# Patient Record
Sex: MALE | Race: WHITE | NOT HISPANIC OR LATINO | Employment: OTHER | URBAN - METROPOLITAN AREA
[De-identification: names, ages, dates, MRNs, and addresses within clinical notes are randomized per-mention and may not be internally consistent; named-entity substitution may affect disease eponyms.]

---

## 2018-07-23 RX ORDER — TIOTROPIUM BROMIDE 18 UG/1
18 CAPSULE ORAL; RESPIRATORY (INHALATION)
COMMUNITY

## 2018-07-23 RX ORDER — AMLODIPINE BESYLATE 10 MG/1
10 TABLET ORAL
COMMUNITY

## 2018-07-23 RX ORDER — HYDRALAZINE HYDROCHLORIDE 25 MG/1
25 TABLET, FILM COATED ORAL
COMMUNITY

## 2018-07-23 RX ORDER — FLUTICASONE PROPIONATE 50 MCG
1 SPRAY, SUSPENSION (ML) NASAL
COMMUNITY

## 2018-07-23 RX ORDER — ALBUTEROL SULFATE 90 UG/1
2 AEROSOL, METERED RESPIRATORY (INHALATION)
COMMUNITY

## 2018-07-23 RX ORDER — ASPIRIN 81 MG/1
81 TABLET ORAL
COMMUNITY

## 2018-07-23 RX ORDER — LISINOPRIL 20 MG/1
20 TABLET ORAL
COMMUNITY

## 2018-07-23 RX ORDER — CLONIDINE HYDROCHLORIDE 0.2 MG/1
0.1 TABLET ORAL
COMMUNITY

## 2018-07-23 RX ORDER — MONTELUKAST SODIUM 10 MG/1
10 TABLET ORAL
COMMUNITY

## 2018-07-23 RX ORDER — BUDESONIDE AND FORMOTEROL FUMARATE DIHYDRATE 160; 4.5 UG/1; UG/1
2 AEROSOL RESPIRATORY (INHALATION)
COMMUNITY

## 2018-07-23 RX ORDER — ATENOLOL 100 MG/1
100 TABLET ORAL
COMMUNITY

## 2018-07-23 RX ORDER — ALBUTEROL SULFATE 2.5 MG/.5ML
1 SOLUTION RESPIRATORY (INHALATION)
COMMUNITY

## 2018-07-26 ENCOUNTER — OFFICE VISIT (OUTPATIENT)
Dept: SURGERY | Facility: CLINIC | Age: 58
End: 2018-07-26
Payer: MEDICAID

## 2018-07-26 VITALS
OXYGEN SATURATION: 91 % | BODY MASS INDEX: 26.37 KG/M2 | DIASTOLIC BLOOD PRESSURE: 71 MMHG | SYSTOLIC BLOOD PRESSURE: 134 MMHG | TEMPERATURE: 97 F | HEIGHT: 67 IN | WEIGHT: 168 LBS | HEART RATE: 70 BPM

## 2018-07-26 DIAGNOSIS — R13.10 DYSPHAGIA, UNSPECIFIED TYPE: Primary | ICD-10-CM

## 2018-07-26 DIAGNOSIS — Z12.11 ENCOUNTER FOR SCREENING COLONOSCOPY: ICD-10-CM

## 2018-07-26 PROCEDURE — 99204 OFFICE O/P NEW MOD 45 MIN: CPT | Mod: S$GLB,,, | Performed by: SURGERY

## 2018-07-26 RX ORDER — SODIUM CHLORIDE 9 MG/ML
INJECTION, SOLUTION INTRAVENOUS CONTINUOUS
Status: CANCELLED | OUTPATIENT
Start: 2018-07-26

## 2018-07-26 RX ORDER — DICLOFENAC SODIUM 10 MG/G
GEL TOPICAL
COMMUNITY
Start: 2017-03-16

## 2018-07-26 RX ORDER — BENZONATATE 100 MG/1
100 CAPSULE ORAL
COMMUNITY

## 2018-07-26 RX ORDER — POLYETHYLENE GLYCOL 3350, SODIUM SULFATE ANHYDROUS, SODIUM BICARBONATE, SODIUM CHLORIDE, POTASSIUM CHLORIDE 236; 22.74; 6.74; 5.86; 2.97 G/4L; G/4L; G/4L; G/4L; G/4L
4 POWDER, FOR SOLUTION ORAL ONCE
Qty: 4000 ML | Refills: 0 | Status: SHIPPED | OUTPATIENT
Start: 2018-07-26 | End: 2018-07-26

## 2018-07-26 RX ORDER — PREDNISONE 20 MG/1
20 TABLET ORAL
COMMUNITY

## 2018-07-26 NOTE — H&P
[facility] Surgery H&P Note    Subjective:       Patient ID: Darien Le is a 58 y.o. male.    Chief Complaint: Consult; Colonoscopy; and EGD    HPI:  Darien Le is a 58 y.o. male with a history of COPD currently on 2 L home oxygen at night and a current 2 pack-a-day smoker for 20 years, gastroesophageal reflux disease with worsening dysphagia symptoms, hypertension, and seasonal allergies presents today as a referral from Rahel Toledo NP for the evaluation of endoscopy.  Patient has had significant dysphagia symptoms for the past 2 years.  He has been told for the last 2 years that he needs double endoscopy for evaluation of his dysphagia as well as to satisfy his need for screening of his colon however he has been reluctant.  Patient has significant hypertension with current treatment including 3-4 medications with 1 of them being clonidine.  He has not noticed any blood in his stool.  Dysphagia is worse at night.  He is constipated which has been increasing in severity in the last year, sometimes goes 2-3 days without bowel movements.  No changes in weight.  No family history of colon or rectal cancers.  He presents today as a new patient evaluation.    Past Medical History:   Diagnosis Date    Allergy     COPD (chronic obstructive pulmonary disease)     GERD (gastroesophageal reflux disease)     Hypertension      Past Surgical History:   Procedure Laterality Date    NECK SURGERY       History reviewed. No pertinent family history.  Social History     Social History    Marital status:      Spouse name: N/A    Number of children: N/A    Years of education: N/A     Social History Main Topics    Smoking status: Current Every Day Smoker    Smokeless tobacco: Never Used    Alcohol use No    Drug use: No    Sexual activity: Not Asked     Other Topics Concern    None     Social History Narrative    None       Current Outpatient Prescriptions   Medication Sig Dispense Refill     albuterol 90 mcg/actuation inhaler Inhale 2 puffs into the lungs.      albuterol sulfate 2.5 mg/0.5 mL Nebu Inhale 1 each into the lungs.      amLODIPine (NORVASC) 10 MG tablet Take 10 mg by mouth.      aspirin (ECOTRIN) 81 MG EC tablet Take 81 mg by mouth.      atenolol (TENORMIN) 100 MG tablet Take 100 mg by mouth.      benzonatate (TESSALON) 100 MG capsule Take 100 mg by mouth.      budesonide-formoterol 160-4.5 mcg (SYMBICORT) 160-4.5 mcg/actuation HFAA Inhale 2 puffs into the lungs.      cloNIDine (CATAPRES) 0.2 MG tablet Take 0.1 mg by mouth.      diclofenac sodium (VOLTAREN) 1 % Gel Apply topically.      fluticasone (FLONASE) 50 mcg/actuation nasal spray 1 spray by Nasal route.      hydrALAZINE (APRESOLINE) 25 MG tablet Take 25 mg by mouth.      lisinopril (PRINIVIL,ZESTRIL) 20 MG tablet Take 20 mg by mouth.      montelukast (SINGULAIR) 10 mg tablet Take 10 mg by mouth.      predniSONE (DELTASONE) 20 MG tablet Take 20 mg by mouth.      tiotropium (SPIRIVA) 18 mcg inhalation capsule Inhale 18 mcg into the lungs.      polyethylene glycol (GOLYTELY,NULYTELY) 236-22.74-6.74 -5.86 gram suspension Take 4,000 mLs (4 L total) by mouth once. for 1 dose 4000 mL 0     No current facility-administered medications for this visit.      Review of patient's allergies indicates:   Allergen Reactions    Codeine Nausea And Vomiting    Umeclidinium bromide Swelling       Review of Systems   Constitutional: Negative for appetite change, chills and fever.   HENT: Negative for congestion, dental problem and drooling.    Eyes: Negative for photophobia, discharge and itching.   Respiratory: Negative for apnea and chest tightness.    Cardiovascular: Negative for chest pain, palpitations and leg swelling.   Gastrointestinal: Positive for constipation. Negative for abdominal distention and abdominal pain.   Endocrine: Negative for cold intolerance and heat intolerance.   Genitourinary: Negative for difficulty urinating  "and dysuria.   Musculoskeletal: Negative for arthralgias and back pain.   Skin: Negative for color change and pallor.   Neurological: Negative for dizziness, facial asymmetry and headaches.   Hematological: Negative for adenopathy. Does not bruise/bleed easily.   Psychiatric/Behavioral: Negative for agitation, behavioral problems and confusion.       Objective:      Vitals:    07/26/18 1020   BP: 134/71   Pulse: 70   Temp: 97 °F (36.1 °C)   TempSrc: Oral   SpO2: (!) 91%   Weight: 76.2 kg (168 lb)   Height: 5' 7" (1.702 m)     Physical Exam   Constitutional: He is oriented to person, place, and time. He appears well-developed and well-nourished.   HENT:   Head: Normocephalic and atraumatic.   Eyes: EOM are normal. Pupils are equal, round, and reactive to light.   Neck: Normal range of motion. Neck supple. No thyromegaly present.   Cardiovascular: Normal rate and regular rhythm.    No murmur heard.  Pulmonary/Chest: Effort normal and breath sounds normal. No respiratory distress.   Abdominal: Soft. Bowel sounds are normal. He exhibits no distension. There is no tenderness.   Musculoskeletal: Normal range of motion. He exhibits no edema.   Neurological: He is alert and oriented to person, place, and time. No cranial nerve deficit.   Skin: Skin is warm. Capillary refill takes less than 2 seconds. No rash noted. He is not diaphoretic. No erythema.   Psychiatric: He has a normal mood and affect.       Lab Review: CBC: No results found for: WBC, RBC, HGB, HCT, PLT  BMP: No results found for: GLU, NA, K, CL, CO2, BUN, CREATININE, CALCIUM       Assessment:       1. Dysphagia, unspecified type    2. Encounter for screening colonoscopy        Plan:   Dysphagia, unspecified type  -     Case Request Operating Room: COLONOSCOPY, ESOPHAGOGASTRODUODENOSCOPY (EGD)  -     Place in Outpatient; Standing  -     Vital signs; Standing  -     Insert peripheral IV; Standing  -     Diet NPO; Standing  -     Place TOR hose; Standing  -     " Place sequential compression device; Standing  -     Basic metabolic panel; Future; Expected date: 07/26/2018  -     CBC auto differential; Future; Expected date: 07/26/2018  -     Pulse Oximetry Q4H; Standing  -     EKG 12-lead; Future    Encounter for screening colonoscopy  -     Case Request Operating Room: COLONOSCOPY, ESOPHAGOGASTRODUODENOSCOPY (EGD)  -     Place in Outpatient; Standing  -     Vital signs; Standing  -     Insert peripheral IV; Standing  -     Diet NPO; Standing  -     Place TOR hose; Standing  -     Place sequential compression device; Standing  -     Basic metabolic panel; Future; Expected date: 07/26/2018  -     CBC auto differential; Future; Expected date: 07/26/2018  -     Pulse Oximetry Q4H; Standing  -     EKG 12-lead; Future    Other orders  -     polyethylene glycol (GOLYTELY,NULYTELY) 236-22.74-6.74 -5.86 gram suspension; Take 4,000 mLs (4 L total) by mouth once. for 1 dose  Dispense: 4000 mL; Refill: 0  -     0.9%  NaCl infusion; Inject into the vein continuous.  -     IP VTE LOW RISK PATIENT; Standing        Medical Decision Making/Counseling:  Patient has increasing dysphagia, constipation symptoms, and has never undergone endoscopy.  He also has gastroesophageal reflux disease with inability to lay completely down at night related to coughing up stuff per his account.  He is in need of EGD and colonoscopy for evaluation of the symptoms as well as his age based risk needs for screening.  Risk and benefits were discussed in detail today.  Patient wishes to proceed the near future.    Risk and benefits of EGD were discussed in clinic in depth.  Risk of EGD were discussed to include bleeding as well as perforation.  Patient understands that if the above procedural risks were to occur, he could need further intervention to include but not exclude a blood transfusion, repeat procedure, admission to the hospital, or even surgery which would likely require transfer to a higher level of  care.   Risks and benefits of Colonoscopy were discussed in depth in clinic as well.  From a procedural standpoint, we discuss the benefits of colonoscopy to be finding colon cancers at early stages, including polyps which can be endoscopically resectable, to finding early stage colon cancers which can be better treated with current medical and surgical therapies in order to give patients a longer survival, if found in these early stages.  From a standpoint of risks, the risk of bleeding and perforation of the colon were discussed.  I personally discussed that if complications of bleeding or perforation were to occur, the patient could need as little as a blood transfusion and as much as possible hospital admission, repeat procedure, or even surgery.  During today's discussion of the procedure of colonoscopy with the patient, I personally sebastián the patient a picture to assist with counseling.  Total time spent in clinic today 45 minutes face-to-face, with greater than half of the time spent in face to face counseling.

## 2018-08-07 ENCOUNTER — HOSPITAL ENCOUNTER (OUTPATIENT)
Dept: RADIOLOGY | Facility: HOSPITAL | Age: 58
Discharge: HOME | End: 2018-08-07
Attending: NURSE PRACTITIONER
Payer: MEDICAID

## 2018-08-07 DIAGNOSIS — M25.512 LEFT SHOULDER PAIN: ICD-10-CM

## 2018-08-07 DIAGNOSIS — M25.512 LEFT SHOULDER PAIN: Primary | ICD-10-CM

## 2018-08-07 PROCEDURE — 73030 X-RAY EXAM OF SHOULDER: CPT | Mod: TC,FY,LT

## 2018-08-07 PROCEDURE — 73030 X-RAY EXAM OF SHOULDER: CPT | Mod: 26,LT,, | Performed by: RADIOLOGY

## 2018-08-24 ENCOUNTER — HOSPITAL ENCOUNTER (OUTPATIENT)
Dept: CARDIOLOGY | Facility: HOSPITAL | Age: 58
Discharge: HOME | End: 2018-08-24
Attending: SURGERY
Payer: MEDICAID

## 2018-08-24 DIAGNOSIS — Z12.11 ENCOUNTER FOR SCREENING COLONOSCOPY: ICD-10-CM

## 2018-08-24 DIAGNOSIS — R13.10 DYSPHAGIA, UNSPECIFIED TYPE: ICD-10-CM

## 2018-08-24 PROCEDURE — 93005 ELECTROCARDIOGRAM TRACING: CPT

## 2018-08-27 ENCOUNTER — HOSPITAL ENCOUNTER (OUTPATIENT)
Facility: HOSPITAL | Age: 58
Discharge: HOME | End: 2018-08-27
Attending: SURGERY | Admitting: SURGERY
Payer: MEDICAID

## 2018-08-27 ENCOUNTER — ANESTHESIA EVENT (OUTPATIENT)
Dept: SURGERY | Facility: HOSPITAL | Age: 58
End: 2018-08-27
Payer: MEDICAID

## 2018-08-27 ENCOUNTER — ANESTHESIA (OUTPATIENT)
Dept: SURGERY | Facility: HOSPITAL | Age: 58
End: 2018-08-27
Payer: MEDICAID

## 2018-08-27 VITALS
RESPIRATION RATE: 15 BRPM | HEIGHT: 67 IN | HEART RATE: 68 BPM | BODY MASS INDEX: 27.31 KG/M2 | OXYGEN SATURATION: 96 % | SYSTOLIC BLOOD PRESSURE: 142 MMHG | DIASTOLIC BLOOD PRESSURE: 84 MMHG | TEMPERATURE: 98 F | WEIGHT: 174 LBS

## 2018-08-27 DIAGNOSIS — M25.512 LEFT SHOULDER PAIN: Primary | ICD-10-CM

## 2018-08-27 DIAGNOSIS — R13.10 DYSPHAGIA, UNSPECIFIED TYPE: ICD-10-CM

## 2018-08-27 DIAGNOSIS — M19.90 OSTEOARTHRITIS: ICD-10-CM

## 2018-08-27 DIAGNOSIS — Z12.11 ENCOUNTER FOR SCREENING COLONOSCOPY: ICD-10-CM

## 2018-08-27 PROCEDURE — 25000003 PHARM REV CODE 250

## 2018-08-27 PROCEDURE — 63600175 PHARM REV CODE 636 W HCPCS: Performed by: NURSE ANESTHETIST, CERTIFIED REGISTERED

## 2018-08-27 PROCEDURE — S0028 INJECTION, FAMOTIDINE, 20 MG: HCPCS

## 2018-08-27 PROCEDURE — 37000009 HC ANESTHESIA EA ADD 15 MINS: Performed by: SURGERY

## 2018-08-27 PROCEDURE — 45384 COLONOSCOPY W/LESION REMOVAL: CPT | Performed by: SURGERY

## 2018-08-27 PROCEDURE — 43239 EGD BIOPSY SINGLE/MULTIPLE: CPT | Mod: 51,,, | Performed by: SURGERY

## 2018-08-27 PROCEDURE — 45384 COLONOSCOPY W/LESION REMOVAL: CPT | Mod: ,,, | Performed by: SURGERY

## 2018-08-27 PROCEDURE — 88305 TISSUE EXAM BY PATHOLOGIST: CPT | Mod: 26,,, | Performed by: PATHOLOGY

## 2018-08-27 PROCEDURE — 88342 IMHCHEM/IMCYTCHM 1ST ANTB: CPT | Performed by: PATHOLOGY

## 2018-08-27 PROCEDURE — 88342 IMHCHEM/IMCYTCHM 1ST ANTB: CPT | Mod: 26,,, | Performed by: PATHOLOGY

## 2018-08-27 PROCEDURE — 27201012 HC FORCEPS, HOT/COLD, DISP: Performed by: SURGERY

## 2018-08-27 PROCEDURE — 43239 EGD BIOPSY SINGLE/MULTIPLE: CPT | Performed by: SURGERY

## 2018-08-27 PROCEDURE — D9220A PRA ANESTHESIA: Mod: ,,, | Performed by: ANESTHESIOLOGY

## 2018-08-27 PROCEDURE — 88305 TISSUE EXAM BY PATHOLOGIST: CPT | Performed by: PATHOLOGY

## 2018-08-27 PROCEDURE — 37000008 HC ANESTHESIA 1ST 15 MINUTES: Performed by: SURGERY

## 2018-08-27 PROCEDURE — 25000003 PHARM REV CODE 250: Performed by: SURGERY

## 2018-08-27 RX ORDER — MIDAZOLAM HYDROCHLORIDE 1 MG/ML
INJECTION, SOLUTION INTRAMUSCULAR; INTRAVENOUS
Status: DISCONTINUED | OUTPATIENT
Start: 2018-08-27 | End: 2018-08-27

## 2018-08-27 RX ORDER — SODIUM CHLORIDE, SODIUM LACTATE, POTASSIUM CHLORIDE, CALCIUM CHLORIDE 600; 310; 30; 20 MG/100ML; MG/100ML; MG/100ML; MG/100ML
INJECTION, SOLUTION INTRAVENOUS CONTINUOUS
Status: DISCONTINUED | OUTPATIENT
Start: 2018-08-27 | End: 2018-08-27 | Stop reason: HOSPADM

## 2018-08-27 RX ORDER — DIPHENHYDRAMINE HYDROCHLORIDE 50 MG/ML
12.5 INJECTION INTRAMUSCULAR; INTRAVENOUS
Status: DISCONTINUED | OUTPATIENT
Start: 2018-08-27 | End: 2018-08-27 | Stop reason: HOSPADM

## 2018-08-27 RX ORDER — SUCRALFATE 1 G/1
1 TABLET ORAL 4 TIMES DAILY
Qty: 120 TABLET | Refills: 0 | Status: SHIPPED | OUTPATIENT
Start: 2018-08-27

## 2018-08-27 RX ORDER — ONDANSETRON 2 MG/ML
4 INJECTION INTRAMUSCULAR; INTRAVENOUS DAILY PRN
Status: DISCONTINUED | OUTPATIENT
Start: 2018-08-27 | End: 2018-08-27 | Stop reason: HOSPADM

## 2018-08-27 RX ORDER — PANTOPRAZOLE SODIUM 40 MG/1
40 TABLET, DELAYED RELEASE ORAL DAILY
Qty: 30 TABLET | Refills: 6 | Status: SHIPPED | OUTPATIENT
Start: 2018-08-27 | End: 2019-01-02 | Stop reason: SDUPTHER

## 2018-08-27 RX ORDER — SODIUM CHLORIDE 9 MG/ML
INJECTION, SOLUTION INTRAVENOUS CONTINUOUS
Status: DISCONTINUED | OUTPATIENT
Start: 2018-08-27 | End: 2018-08-27 | Stop reason: HOSPADM

## 2018-08-27 RX ORDER — FAMOTIDINE 10 MG/ML
20 INJECTION INTRAVENOUS ONCE
Status: DISCONTINUED | OUTPATIENT
Start: 2018-08-27 | End: 2018-08-27 | Stop reason: HOSPADM

## 2018-08-27 RX ORDER — FAMOTIDINE 10 MG/ML
INJECTION INTRAVENOUS
Status: COMPLETED
Start: 2018-08-27 | End: 2018-08-27

## 2018-08-27 RX ORDER — LIDOCAINE HYDROCHLORIDE 10 MG/ML
1 INJECTION, SOLUTION EPIDURAL; INFILTRATION; INTRACAUDAL; PERINEURAL ONCE
Status: DISCONTINUED | OUTPATIENT
Start: 2018-08-27 | End: 2018-08-27 | Stop reason: HOSPADM

## 2018-08-27 RX ORDER — PROPOFOL 10 MG/ML
VIAL (ML) INTRAVENOUS
Status: DISCONTINUED | OUTPATIENT
Start: 2018-08-27 | End: 2018-08-27

## 2018-08-27 RX ORDER — SODIUM CHLORIDE, SODIUM LACTATE, POTASSIUM CHLORIDE, CALCIUM CHLORIDE 600; 310; 30; 20 MG/100ML; MG/100ML; MG/100ML; MG/100ML
125 INJECTION, SOLUTION INTRAVENOUS CONTINUOUS
Status: DISCONTINUED | OUTPATIENT
Start: 2018-08-27 | End: 2018-08-27 | Stop reason: HOSPADM

## 2018-08-27 RX ADMIN — PROPOFOL 20 MG: 10 INJECTION, EMULSION INTRAVENOUS at 08:08

## 2018-08-27 RX ADMIN — PROPOFOL 20 MG: 10 INJECTION, EMULSION INTRAVENOUS at 09:08

## 2018-08-27 RX ADMIN — FAMOTIDINE 20 MG: 10 INJECTION, SOLUTION INTRAVENOUS at 07:08

## 2018-08-27 RX ADMIN — SODIUM CHLORIDE: 9 INJECTION, SOLUTION INTRAVENOUS at 07:08

## 2018-08-27 RX ADMIN — PROPOFOL 30 MG: 10 INJECTION, EMULSION INTRAVENOUS at 08:08

## 2018-08-27 RX ADMIN — PROPOFOL 50 MG: 10 INJECTION, EMULSION INTRAVENOUS at 08:08

## 2018-08-27 RX ADMIN — MIDAZOLAM HYDROCHLORIDE 2 MG: 1 INJECTION, SOLUTION INTRAMUSCULAR; INTRAVENOUS at 08:08

## 2018-08-27 NOTE — [employer] PATIENT INSTRUCTIONS
Discharge Summary/Instructions after an Endoscopic Procedure  Patient Name: Darien Le  Patient MRN: [id number]  Patient YOB: 1960 Monday, August 27, 2018  Reji Dsouza MD  RESTRICTIONS:  During your procedure today, you received medications for sedation.  These   medications may affect your judgment, balance and coordination.  Therefore,   for 24 hours, you have the following restrictions:   - DO NOT drive a car, operate machinery, make legal/financial decisions,   sign important papers or drink alcohol.    ACTIVITY:  Today: no heavy lifting, straining or running due to procedural   sedation/anesthesia.  The following day: return to full activity including work.  DIET:  Eat and drink normally unless instructed otherwise.     TREATMENT FOR COMMON SIDE EFFECTS:  - Mild abdominal pain, nausea, belching, bloating or excessive gas:  rest,   eat lightly and use a heating pad.  - Sore Throat: treat with throat lozenges and/or gargle with warm salt   water.  - Because air was used during the procedure, expelling large amounts of air   from your rectum or belching is normal.  - If a bowel prep was taken, you may not have a bowel movement for 1-3 days.    This is normal.  SYMPTOMS TO WATCH FOR AND REPORT TO YOUR PHYSICIAN:  1. Abdominal pain or bloating, other than gas cramps.  2. Chest pain.  3. Back pain.  4. Signs of infection such as: chills or fever occurring within 24 hours   after the procedure.  5. Rectal bleeding, which would show as bright red, maroon, or black stools.   (A tablespoon of blood from the rectum is not serious, especially if   hemorrhoids are present.)  6. Vomiting.  7. Weakness or dizziness.  GO DIRECTLY TO THE NEAREST EMERGENCY ROOM IF YOU HAVE ANY OF THE FOLLOWING:      Difficulty breathing              Chills and/or fever over 101 F   Persistent vomiting and/or vomiting blood   Severe abdominal pain   Severe chest pain   Black, tarry stools   Bleeding- more than one  tablespoon   Any other symptom or condition that you feel may need urgent attention  Your doctor recommends these additional instructions:  If any biopsies were taken, your doctors clinic will contact you in 1 to 2   weeks with any results.  - Discharge patient to home (ambulatory).   - Resume previous diet.   - Use Protonix (pantoprazole) 40 mg PO daily.   - Use sucralfate tablets 1 gram PO QID.   - Await pathology results.   - Repeat upper endoscopy in 2 years for surveillance.   - Return to my office in 2 weeks.  For questions, problems or results please call your physician - Reji Dsouza MD at Work:  [phone].  [facility] EMERGENCY ROOM PHONE NUMBER: [phone]  IF A COMPLICATION OR EMERGENCY SITUATION ARISES AND YOU ARE UNABLE TO REACH   YOUR PHYSICIAN - GO DIRECTLY TO THE EMERGENCY ROOM.  MD Reji Mcgarry MD  8/27/2018 9:16:31 AM  This report has been verified and signed electronically.  [employer]

## 2018-08-27 NOTE — DISCHARGE INSTRUCTIONS
Colonoscopy     A camera attached to a flexible tube with a viewing lens is used to take video pictures.     Colonoscopy is a test to view the inside of your lower digestive tract (colon and rectum). Sometimes it can show the last part of the small intestine (ileum). During the test, small pieces of tissue may be removed for testing. This is called a biopsy. Small growths, such as polyps, may also be removed.   Why is colonoscopy done?  The test is done to help look for colon cancer. And it can help find the source of abdominal pain, bleeding, and changes in bowel habits. It may be needed once a year, depending on factors such as your:  · Age  · Health history  · Family health history  · Symptoms  · Results from any prior colonoscopy  Risks and possible complications  These include:  · Bleeding               · A puncture or tear in the colon   · Risks of anesthesia  · A cancer lesion not being seen  Getting ready   To prepare for the test:  · Talk with your healthcare provider about the risks of the test (see below). Also ask your healthcare provider about alternatives to the test.  · Tell your healthcare provider about any medicines you take. Also tell him or her about any health conditions you may have.  · Make sure your rectum and colon are empty for the test. Follow the diet and bowel prep instructions exactly. If you dont, the test may need to be rescheduled.  · Plan for a friend or family member to drive you home after the test.     Colonoscopy provides an inside view of the entire colon.     You may discuss the results with your doctor right away or at a future visit.  During the test   The test is usually done in the hospital on an outpatient basis. This means you go home the same day. The procedure takes about 30 minutes. During that time:  · You are given relaxing (sedating) medicine through an IV line. You may be drowsy, or fully asleep.  · The healthcare provider will first give you a physical exam to  check for anal and rectal problems.  · Then the anus is lubricated and the scope inserted.  · If you are awake, you may have a feeling similar to needing to have a bowel movement. You may also feel pressure as air is pumped into the colon. Its OK to pass gas during the procedure.  · Biopsy, polyp removal, or other treatments may be done during the test.  After the test   You may have gas right after the test. It can help to try to pass it to help prevent later bloating. Your healthcare provider may discuss the results with you right away. Or you may need to schedule a follow-up visit to talk about the results. After the test, you can go back to your normal eating and other activities. You may be tired from the sedation and need to rest for a few hours.  Date Last Reviewed: 11/1/2016 © 2000-2017 [employer]. [address] [place] [address]. All rights reserved. This information is not intended as a substitute for professional medical care. Always follow your healthcare professional's instructions.        Discharge Instructions: After Your Surgery  Youve just had surgery. During surgery, you were given medicine called anesthesia to keep you relaxed and free of pain. After surgery, you may have some pain or nausea. This is common. Here are some tips for feeling better and getting well after surgery.     Stay on schedule with your medicine.   Going home  Your healthcare provider will show you how to take care of yourself when you go home. He or she will also answer your questions. Have an adult family member or friend drive you home. For the first 24 hours after your surgery:  · Do not drive or use heavy equipment.  · Do not make important decisions or sign legal papers.  · Do not drink alcohol.  · Have someone stay with you, if needed. He or she can watch for problems and help keep you safe.  Be sure to go to all follow-up visits with your healthcare provider. And rest after your surgery for  as long as your healthcare provider tells you to.  Coping with pain  If you have pain after surgery, pain medicine will help you feel better. Take it as told, before pain becomes severe. Also, ask your healthcare provider or pharmacist about other ways to control pain. This might be with heat, ice, or relaxation. And follow any other instructions your surgeon or nurse gives you.  Tips for taking pain medicine  To get the best relief possible, remember these points:  · Pain medicines can upset your stomach. Taking them with a little food may help.  · Most pain relievers taken by mouth need at least 20 to 30 minutes to start to work.  · Taking medicine on a schedule can help you remember to take it. Try to time your medicine so that you can take it before starting an activity. This might be before you get dressed, go for a walk, or sit down for dinner.  · Constipation is a common side effect of pain medicines. Call your healthcare provider before taking any medicines such as laxatives or stool softeners to help ease constipation. Also ask if you should skip any foods. Drinking lots of fluids and eating foods such as fruits and vegetables that are high in fiber can also help. Remember, do not take laxatives unless your surgeon has prescribed them.  · Drinking alcohol and taking pain medicine can cause dizziness and slow your breathing. It can even be deadly. Do not drink alcohol while taking pain medicine.  · Pain medicine can make you react more slowly to things. Do not drive or run machinery while taking pain medicine.  Your healthcare provider may tell you to take acetaminophen to help ease your pain. Ask him or her how much you are supposed to take each day. Acetaminophen or other pain relievers may interact with your prescription medicines or other over-the-counter (OTC) medicines. Some prescription medicines have acetaminophen and other ingredients. Using both prescription and OTC acetaminophen for pain can cause  you to overdose. Read the labels on your OTC medicines with care. This will help you to clearly know the list of ingredients, how much to take, and any warnings. It may also help you not take too much acetaminophen. If you have questions or do not understand the information, ask your pharmacist or healthcare provider to explain it to you before you take the OTC medicine.  Managing nausea  Some people have an upset stomach after surgery. This is often because of anesthesia, pain, or pain medicine, or the stress of surgery. These tips will help you handle nausea and eat healthy foods as you get better. If you were on a special food plan before surgery, ask your healthcare provider if you should follow it while you get better. These tips may help:  · Do not push yourself to eat. Your body will tell you when to eat and how much.  · Start off with clear liquids and soup. They are easier to digest.  · Next try semi-solid foods, such as mashed potatoes, applesauce, and gelatin, as you feel ready.  · Slowly move to solid foods. Dont eat fatty, rich, or spicy foods at first.  · Do not force yourself to have 3 large meals a day. Instead eat smaller amounts more often.  · Take pain medicines with a small amount of solid food, such as crackers or toast, to avoid nausea.     Call your surgeon if  · You still have pain an hour after taking medicine. The medicine may not be strong enough.  · You feel too sleepy, dizzy, or groggy. The medicine may be too strong.  · You have side effects like nausea, vomiting, or skin changes, such as rash, itching, or hives.       If you have obstructive sleep apnea  You were given anesthesia medicine during surgery to keep you comfortable and free of pain. After surgery, you may have more apnea spells because of this medicine and other medicines you were given. The spells may last longer than usual.   At home:  · Keep using the continuous positive airway pressure (CPAP) device when you sleep.  Unless your healthcare provider tells you not to, use it when you sleep, day or night. CPAP is a common device used to treat obstructive sleep apnea.  · Talk with your provider before taking any pain medicine, muscle relaxants, or sedatives. Your provider will tell you about the possible dangers of taking these medicines.  Date Last Reviewed: 12/1/2016 © 2000-2017 [employer]. [address] [place] [address], [place] [address]. All rights reserved. This information is not intended as a substitute for professional medical care. Always follow your healthcare professional's instructions.

## 2018-08-27 NOTE — H&P
[facility] H&P Note     Subjective:       Patient ID: Darien Le is a 58 y.o. male.     Chief Complaint: Consult; Colonoscopy; and EGD     HPI:  Darien Le is a 58 y.o. male with a history of COPD currently on 2 L home oxygen at night and a current 2 pack-a-day smoker for 20 years, gastroesophageal reflux disease with worsening dysphagia symptoms, hypertension, and seasonal allergies presents today as a referral from Rahel Toledo NP for the evaluation of endoscopy.  Patient has had significant dysphagia symptoms for the past 2 years.  He has been told for the last 2 years that he needs double endoscopy for evaluation of his dysphagia as well as to satisfy his need for screening of his colon however he has been reluctant.  Patient has significant hypertension with current treatment including 3-4 medications with 1 of them being clonidine.  He has not noticed any blood in his stool.  Dysphagia is worse at night.  He is constipated which has been increasing in severity in the last year, sometimes goes 2-3 days without bowel movements.  No changes in weight.  No family history of colon or rectal cancers.  He presents today as a new patient evaluation.          Past Medical History:   Diagnosis Date    Allergy      COPD (chronic obstructive pulmonary disease)      GERD (gastroesophageal reflux disease)      Hypertension              Past Surgical History:   Procedure Laterality Date    NECK SURGERY          History reviewed. No pertinent family history.  Social History            Social History    Marital status:        Spouse name: N/A    Number of children: N/A    Years of education: N/A           Social History Main Topics    Smoking status: Current Every Day Smoker    Smokeless tobacco: Never Used    Alcohol use No    Drug use: No    Sexual activity: Not Asked      Other Topics Concern    None          Social History Narrative    None         Current Medications           Current Outpatient Prescriptions   Medication Sig Dispense Refill    albuterol 90 mcg/actuation inhaler Inhale 2 puffs into the lungs.        albuterol sulfate 2.5 mg/0.5 mL Nebu Inhale 1 each into the lungs.        amLODIPine (NORVASC) 10 MG tablet Take 10 mg by mouth.        aspirin (ECOTRIN) 81 MG EC tablet Take 81 mg by mouth.        atenolol (TENORMIN) 100 MG tablet Take 100 mg by mouth.        benzonatate (TESSALON) 100 MG capsule Take 100 mg by mouth.        budesonide-formoterol 160-4.5 mcg (SYMBICORT) 160-4.5 mcg/actuation HFAA Inhale 2 puffs into the lungs.        cloNIDine (CATAPRES) 0.2 MG tablet Take 0.1 mg by mouth.        diclofenac sodium (VOLTAREN) 1 % Gel Apply topically.        fluticasone (FLONASE) 50 mcg/actuation nasal spray 1 spray by Nasal route.        hydrALAZINE (APRESOLINE) 25 MG tablet Take 25 mg by mouth.        lisinopril (PRINIVIL,ZESTRIL) 20 MG tablet Take 20 mg by mouth.        montelukast (SINGULAIR) 10 mg tablet Take 10 mg by mouth.        predniSONE (DELTASONE) 20 MG tablet Take 20 mg by mouth.        tiotropium (SPIRIVA) 18 mcg inhalation capsule Inhale 18 mcg into the lungs.        polyethylene glycol (GOLYTELY,NULYTELY) 236-22.74-6.74 -5.86 gram suspension Take 4,000 mLs (4 L total) by mouth once. for 1 dose 4000 mL 0      No current facility-administered medications for this visit.               Review of patient's allergies indicates:   Allergen Reactions    Codeine Nausea And Vomiting    Umeclidinium bromide Swelling         Review of Systems   Constitutional: Negative for appetite change, chills and fever.   HENT: Negative for congestion, dental problem and drooling.    Eyes: Negative for photophobia, discharge and itching.   Respiratory: Negative for apnea and chest tightness.    Cardiovascular: Negative for chest pain, palpitations and leg swelling.   Gastrointestinal: Positive for constipation. Negative for abdominal distention and abdominal pain.  "  Endocrine: Negative for cold intolerance and heat intolerance.   Genitourinary: Negative for difficulty urinating and dysuria.   Musculoskeletal: Negative for arthralgias and back pain.   Skin: Negative for color change and pallor.   Neurological: Negative for dizziness, facial asymmetry and headaches.   Hematological: Negative for adenopathy. Does not bruise/bleed easily.   Psychiatric/Behavioral: Negative for agitation, behavioral problems and confusion.       Objective:   Vitals       Vitals:     07/26/18 1020   BP: 134/71   Pulse: 70   Temp: 97 °F (36.1 °C)   TempSrc: Oral   SpO2: (!) 91%   Weight: 76.2 kg (168 lb)   Height: 5' 7" (1.702 m)         Physical Exam   Constitutional: He is oriented to person, place, and time. He appears well-developed and well-nourished.   HENT:   Head: Normocephalic and atraumatic.   Eyes: EOM are normal. Pupils are equal, round, and reactive to light.   Neck: Normal range of motion. Neck supple. No thyromegaly present.   Cardiovascular: Normal rate and regular rhythm.    No murmur heard.  Pulmonary/Chest: Effort normal and breath sounds normal. No respiratory distress.   Abdominal: Soft. Bowel sounds are normal. He exhibits no distension. There is no tenderness.   Musculoskeletal: Normal range of motion. He exhibits no edema.   Neurological: He is alert and oriented to person, place, and time. No cranial nerve deficit.   Skin: Skin is warm. Capillary refill takes less than 2 seconds. No rash noted. He is not diaphoretic. No erythema.   Psychiatric: He has a normal mood and affect.         Lab Review: CBC: No results found for: WBC, RBC, HGB, HCT, PLT  BMP: No results found for: GLU, NA, K, CL, CO2, BUN, CREATININE, CALCIUM     Assessment:       1. Dysphagia, unspecified type    2. Encounter for screening colonoscopy        Plan:   Dysphagia, unspecified type  -     Case Request Operating Room: COLONOSCOPY, ESOPHAGOGASTRODUODENOSCOPY (EGD)  -     Place in Outpatient; Standing  -  "    Vital signs; Standing  -     Insert peripheral IV; Standing  -     Diet NPO; Standing  -     Place TOR hose; Standing  -     Place sequential compression device; Standing  -     Basic metabolic panel; Future; Expected date: 07/26/2018  -     CBC auto differential; Future; Expected date: 07/26/2018  -     Pulse Oximetry Q4H; Standing  -     EKG 12-lead; Future     Encounter for screening colonoscopy  -     Case Request Operating Room: COLONOSCOPY, ESOPHAGOGASTRODUODENOSCOPY (EGD)  -     Place in Outpatient; Standing  -     Vital signs; Standing  -     Insert peripheral IV; Standing  -     Diet NPO; Standing  -     Place TOR hose; Standing  -     Place sequential compression device; Standing  -     Basic metabolic panel; Future; Expected date: 07/26/2018  -     CBC auto differential; Future; Expected date: 07/26/2018  -     Pulse Oximetry Q4H; Standing  -     EKG 12-lead; Future     Other orders  -     polyethylene glycol (GOLYTELY,NULYTELY) 236-22.74-6.74 -5.86 gram suspension; Take 4,000 mLs (4 L total) by mouth once. for 1 dose  Dispense: 4000 mL; Refill: 0  -     0.9%  NaCl infusion; Inject into the vein continuous.  -     IP VTE LOW RISK PATIENT; Standing           Medical Decision Making/Counseling:  Patient has increasing dysphagia, constipation symptoms, and has never undergone endoscopy.  He also has gastroesophageal reflux disease with inability to lay completely down at night related to coughing up stuff per his account.  He is in need of EGD and colonoscopy for evaluation of the symptoms as well as his age based risk needs for screening.  Risk and benefits were discussed in detail today.  Patient wishes to proceed the near future.     Risk and benefits of EGD were discussed in clinic in depth.  Risk of EGD were discussed to include bleeding as well as perforation.  Patient understands that if the above procedural risks were to occur, he could need further intervention to include but not exclude a blood  transfusion, repeat procedure, admission to the hospital, or even surgery which would likely require transfer to a higher level of care.   Risks and benefits of Colonoscopy were discussed in depth in clinic as well.  From a procedural standpoint, we discuss the benefits of colonoscopy to be finding colon cancers at early stages, including polyps which can be endoscopically resectable, to finding early stage colon cancers which can be better treated with current medical and surgical therapies in order to give patients a longer survival, if found in these early stages.  From a standpoint of risks, the risk of bleeding and perforation of the colon were discussed.  I personally discussed that if complications of bleeding or perforation were to occur, the patient could need as little as a blood transfusion and as much as possible hospital admission, repeat procedure, or even surgery.  During today's discussion of the procedure of colonoscopy with the patient, I personally sebastián the patient a picture to assist with counseling.  Total time spent in clinic today 45 minutes face-to-face, with greater than half of the time spent in face to face counseling.                  Patient seen and examined this am.  Ready for egd/cscope.  No changes since H&P

## 2018-08-27 NOTE — ANESTHESIA PREPROCEDURE EVALUATION
08/27/2018  Darien Le is a 58 y.o., male.    Anesthesia Evaluation    I have reviewed the Patient Summary Reports.    I have reviewed the Nursing Notes.   I have reviewed the Medications.   Steroids Taken In Past Year: Prednisone    Review of Systems  Cardiovascular:   Hypertension    Pulmonary:   COPD        Physical Exam  General:  Well nourished    Airway/Jaw/Neck:  Airway Findings: Mouth Opening: Normal Tongue: Normal  General Airway Assessment: Adult  Mallampati: III  TM Distance: Normal, at least 6 cm  Jaw/Neck Findings:  Neck ROM: Normal ROM      Dental:  Dental Findings: Edentulous   Chest/Lungs:  Chest/Lungs Findings: Clear to auscultation     Heart/Vascular:  Heart Findings: Rate: Normal  Rhythm: Regular Rhythm        Mental Status:  Mental Status Findings:  Cooperative, Alert and Oriented         Anesthesia Plan  Type of Anesthesia, risks & benefits discussed:  Anesthesia Type:  general  Patient's Preference:   Intra-op Monitoring Plan: standard ASA monitors  Intra-op Monitoring Plan Comments:   Post Op Pain Control Plan: IV/PO Opioids PRN  Post Op Pain Control Plan Comments:   Induction:   IV  Beta Blocker:  Patient is not currently on a Beta-Blocker (No further documentation required).       Informed Consent: Patient understands risks and agrees with Anesthesia plan.  Questions answered. Anesthesia consent signed with patient.  ASA Score: 3     Day of Surgery Review of History & Physical: I have interviewed and examined the patient. I have reviewed the patient's H&P dated:            Ready For Surgery From Anesthesia Perspective.

## 2018-08-27 NOTE — ANESTHESIA POSTPROCEDURE EVALUATION
"Anesthesia Post Evaluation    Patient: Darien Le    Procedure(s) Performed: Procedure(s) (LRB):  COLONOSCOPY (N/A)  ESOPHAGOGASTRODUODENOSCOPY (EGD) (N/A)    Final Anesthesia Type: general  Patient location during evaluation: PACU  Patient participation: Yes- Able to Participate  Level of consciousness: awake and alert  Pain management: adequate  Airway patency: patent  PONV status at discharge: No PONV  Anesthetic complications: no      Cardiovascular status: blood pressure returned to baseline  Respiratory status: unassisted  Hydration status: euvolemic  Follow-up not needed.        Visit Vitals  BP (!) 142/84   Pulse 68   Temp 36.7 °C (98 °F) (Oral)   Resp 15   Ht 5' 7" (1.702 m)   Wt 78.9 kg (174 lb)   SpO2 96%   BMI 27.25 kg/m²       Pain/Mary Anne Score: Mary Anne Score: 10 (8/27/2018  9:40 AM)        "

## 2018-08-27 NOTE — TRANSFER OF CARE
"Anesthesia Transfer of Care Note    Patient: Darien Le    Procedure(s) Performed: Procedure(s) (LRB):  COLONOSCOPY (N/A)  ESOPHAGOGASTRODUODENOSCOPY (EGD) (N/A)    Patient location: PACU    Anesthesia Type: general    Transport from OR: Transported from OR on room air with adequate spontaneous ventilation    Post pain: adequate analgesia    Post assessment: no apparent anesthetic complications and tolerated procedure well    Post vital signs: stable    Level of consciousness: awake, alert and oriented    Nausea/Vomiting: no nausea/vomiting    Complications: none    Transfer of care protocol was followed      Last vitals:   Visit Vitals  /80   Pulse 70   Temp 36.7 °C (98 °F) (Oral)   Resp 15   Ht 5' 7" (1.702 m)   Wt 78.9 kg (174 lb)   SpO2 (!) 90%   BMI 27.25 kg/m²     "

## 2018-08-27 NOTE — [employer] PATIENT INSTRUCTIONS
Discharge Summary/Instructions after an Endoscopic Procedure  Patient Name: Darien Le  Patient MRN: [id number]  Patient YOB: 1960 Monday, August 27, 2018  Reji Dsouza MD  RESTRICTIONS:  During your procedure today, you received medications for sedation.  These   medications may affect your judgment, balance and coordination.  Therefore,   for 24 hours, you have the following restrictions:   - DO NOT drive a car, operate machinery, make legal/financial decisions,   sign important papers or drink alcohol.    ACTIVITY:  Today: no heavy lifting, straining or running due to procedural   sedation/anesthesia.  The following day: return to full activity including work.  DIET:  Eat and drink normally unless instructed otherwise.     TREATMENT FOR COMMON SIDE EFFECTS:  - Mild abdominal pain, nausea, belching, bloating or excessive gas:  rest,   eat lightly and use a heating pad.  - Sore Throat: treat with throat lozenges and/or gargle with warm salt   water.  - Because air was used during the procedure, expelling large amounts of air   from your rectum or belching is normal.  - If a bowel prep was taken, you may not have a bowel movement for 1-3 days.    This is normal.  SYMPTOMS TO WATCH FOR AND REPORT TO YOUR PHYSICIAN:  1. Abdominal pain or bloating, other than gas cramps.  2. Chest pain.  3. Back pain.  4. Signs of infection such as: chills or fever occurring within 24 hours   after the procedure.  5. Rectal bleeding, which would show as bright red, maroon, or black stools.   (A tablespoon of blood from the rectum is not serious, especially if   hemorrhoids are present.)  6. Vomiting.  7. Weakness or dizziness.  GO DIRECTLY TO THE NEAREST EMERGENCY ROOM IF YOU HAVE ANY OF THE FOLLOWING:      Difficulty breathing              Chills and/or fever over 101 F   Persistent vomiting and/or vomiting blood   Severe abdominal pain   Severe chest pain   Black, tarry stools   Bleeding- more than one  tablespoon   Any other symptom or condition that you feel may need urgent attention  Your doctor recommends these additional instructions:  If any biopsies were taken, your doctors clinic will contact you in 1 to 2   weeks with any results.  - Discharge patient to home (ambulatory).   - High fiber diet.   - Await pathology results.   - Repeat colonoscopy in 3 years for surveillance.   - Return to my office in 2 weeks.  For questions, problems or results please call your physician - Reji Dsouza MD at Work:  [phone].  [facility] EMERGENCY ROOM PHONE NUMBER: [phone]  IF A COMPLICATION OR EMERGENCY SITUATION ARISES AND YOU ARE UNABLE TO REACH   YOUR PHYSICIAN - GO DIRECTLY TO THE EMERGENCY ROOM.  MD Reji Mcgarry MD  8/27/2018 9:19:23 AM  This report has been verified and signed electronically.  [employer]

## 2018-08-31 ENCOUNTER — CLINICAL SUPPORT (OUTPATIENT)
Dept: REHABILITATION | Facility: HOSPITAL | Age: 58
End: 2018-08-31
Payer: MEDICAID

## 2018-08-31 DIAGNOSIS — M62.81 MUSCLE WEAKNESS: ICD-10-CM

## 2018-08-31 PROCEDURE — 97161 PT EVAL LOW COMPLEX 20 MIN: CPT

## 2018-08-31 NOTE — PROGRESS NOTES
[facility]  Physical Therapy Initial Evaluation/Plan of Care    Name: Arlyn eL  Clinic Number: [id number]    Therapy Diagnosis:   Encounter Diagnosis   Name Primary?    Muscle weakness      Physician:  Rahel VELAZQUEZ    Physician Orders: PT Eval and Treat left shoulder  Medical Diagnosis: Pain in left shoulder  Evaluation Date: 8/31/2018  Authorization period Expiration:  n/a  Plan of Care Certification Period: 11/26/18    Visit #: 1/ Visits authorized: 12  Time In:10:10  Time Out: 10:50  Total Billable Time: 25 minutes    Precautions: Standard  Subjective   Date of onset: chronic  History of current condition - ARLYN presents with progressive left shoulder pain increasing over past 4 months, no specific injury reported. He has been told he needs a RTC repair as well as bilateral TKA's. He states he was scheduled for left TKR in April of this year but had to cancel secondary to COPD exacerbation found when getting medical clearance. Past medical history also significant for ACF of C5-C7 in 1999.      Past Medical History:   Diagnosis Date    Allergy     COPD (chronic obstructive pulmonary disease)     Hypertension      Arlyn Le  has a past surgical history that includes Neck surgery; COLONOSCOPY (N/A, 8/27/2018); and ESOPHAGOGASTRODUODENOSCOPY (EGD) (N/A, 8/27/2018).    Arlyn has a current medication list which includes the following prescription(s): albuterol, albuterol sulfate, amlodipine, aspirin, atenolol, benzonatate, budesonide-formoterol 160-4.5 mcg, clonidine, diclofenac sodium, fluticasone, hydralazine, lisinopril, montelukast, pantoprazole, prednisone, sucralfate, and tiotropium.    Review of patient's allergies indicates:   Allergen Reactions    Codeine Nausea And Vomiting      Imaging, X-ray: 08/07/18  1. Mild glenohumeral degenerative osteoarthrosis.  2. Moderate AC joint degenerative osteoarthrosis.    Prior Therapy: none   Social History:  lives with their  family  Occupation: sports referee  Prior Level of Function: independent ADL's with no mobility issues  Current Level of Function: pain limiting LUE function    Pain:  Current 7/10, worst 10/10, best 4/10   Location: shoulder  left  Description: Aching, Burning, Sharp and Variable  Aggravating Factors: Lifting, lying on right side, sometimes for no reason at all, reaching, lifting overhead  Easing Factors: nothing    Pts goals: Decrease pain and improve function of LUE.    Objective   Observation: Patient is well developed, well nourished male in NAD.  Postural examination/scapula alignment: Rounded shoulder, Head forward, Affected scapula depressed, Affected scapula abducted, Affected scapula downwardly rotated, Slouched posture, Increased thoracic kyphosis, Decreased cervical lordosis and RTC/scapular muscle atrophy evident.    Active / Passive Range of Motion:   Shoulder Left Right   Flexion 124/170 WFL's   Abduction 41/153 WFL's   ER at 90 L C-6/80 WFL's   IR Lat hip/60 WFL's     Upper Extremity Strength  (R) UE  (L) UE    Shoulder flexion: 5/5 Shoulder flexion: 4/5   Shoulder Abduction: 5/5 Shoulder abduction: 4-/5   Shoulder ER 5/5 Shoulder ER 4-/5   Shoulder IR 5/5 Shoulder IR 4/5   Biceps 5/5 Biceps 4-/5   Triceps 5/5 Triceps 5/5   Rhomboids 4/5 Rhomboids 4-/5     Special Tests:  AC Joint Left   AC Joint Compression Test pos   Empty Can Test pos   Drop Arm test neg   Subscaputlaris Lift Off pos   Hawkin's Kenndy pos   Neer's Test neg   Speed's test pos   Anterior Apprehension test neg   Sulcus Sign neg   Joint Mobility: decreased thoracic spine, posterior and caudal G-H jt  UE reflexes: intact and symmetrical bilaterally   Palpation: tenderness to palpation anterior and lateral shoulder, A-C joint  Sensation: gross sensation intact  Flexibility: decreased anterior/chest muscles    PT Evaluation Completed:Yes  Discussed Plan of Care with patient: Yes    TREATMENT   Home Exercises and Patient Education  Provided    Education provided re: postural correction  - progress towards goals   - role of therapy in multi - disciplinary team, goals for therapy  No spiritual or educational barriers to learning provided    Written Home Exercises Provided: no  Assessment   This is a 58 year old male referred to outpatient physical therapy and presents with a medical diagnosis of left shoulder pain and demonstrates limitations as described in the problem list. Pt will benefit from physcial therapy services in order to maximize pain free functional independence. The following goals were discussed with the patient and patient is in agreement with them as to be addressed in the treatment plan.     Pt prognosis is Good.   Pt will benefit from skilled outpatient Physical Therapy to address the deficits stated above and in the chart below, provide pt/family education, and to maximize pt's level of independence.     Plan of care discussed with patient: Yes  Pt's spiritual, cultural and educational needs considered and pt agreeable to plan of care and goals as stated below:     Anticipated Barriers for therapy: none identified    Medical necessity is demonstrated by the following IMPAIRMENTS/PROBLEM LIST:   1)Increase in pain level limiting function   2)muscle weakness   3)positive impingement   4)postural dysfunction   5)Lack of HEP    GOALS: Short Term Goals: 3 weeks  1.Report decreased left shoulder pain  <   / =  5 /10  to increase tolerance for ADL's and functional activities.  2. Increased AROM by > / = 15 degrees flex, 30 degrees abd, and 10 degrees IR/ER to increase LUE function.  3. Increased MMT for left shoulder by 1/2 muscle grade to increase tolerance for ADL and work activities.  4. Pt to demonstrate improved postural awareness during exercise with minimal cueing.  5. Pt to tolerate HEP to improve ROM and independence with ADL's    Long Term Goals: 6 weeks  1.Report decreased left shoulder pain  <   / =  2.5 /10  to  increase tolerance for ADL's and functional activities.   2.Pt to demonstrate functional AROM left shoulder for improved performance of reaching and overhead activities.  3.Increased MMT for left shoulder by 1 muscle  to increase tolerance for ADL and work activities.  4. Pt to report a decrease score from 68% to < / = 50% impairment on Quick Dash to demonstrate improved UE function.  5. Pt to be Independent with HEP to improve ROM and independence with ADL's.    Plan   Pt will be treated by physical therapy 2 times a week for 6 weeks for Pt Education, HEP, therapeutic exercises, neuromuscular re-education, joint mobilizations, modalities prn to achieve established goals. Darien may at times be seen by a PTA as part of the Rehab Team.     Cont PT for 6 weeks.     Sanjeev Silva, PT    I certify the need for these services furnished under this plan of treatment and while under my care.______________________________ Physician/Referring Practitioner  Date of Signature

## 2018-08-31 NOTE — PLAN OF CARE
[facility]  Physical Therapy Initial Evaluation/Plan of Care    Name: Arlyn Le  Clinic Number: [id number]    Therapy Diagnosis:   Encounter Diagnosis   Name Primary?    Muscle weakness      Physician:  Rahel VELAZQUEZ    Physician Orders: PT Eval and Treat left shoulder  Medical Diagnosis: Pain in left shoulder  Evaluation Date: 8/31/2018  Authorization period Expiration:  n/a  Plan of Care Certification Period: 11/26/18    Visit #: 1/ Visits authorized: 12  Time In:10:10  Time Out: 10:50  Total Billable Time: 25 minutes    Precautions: Standard  Subjective   Date of onset: chronic  History of current condition - ARLYN presents with progressive left shoulder pain increasing over past 4 months, no specific injury reported. He has been told he needs a RTC repair as well as bilateral TKA's. He states he was scheduled for left TKR in April of this year but had to cancel secondary to COPD exacerbation found when getting medical clearance. Past medical history also significant for ACF of C5-C7 in 1999.      Past Medical History:   Diagnosis Date    Allergy     COPD (chronic obstructive pulmonary disease)     Hypertension      Arlyn Le  has a past surgical history that includes Neck surgery; COLONOSCOPY (N/A, 8/27/2018); and ESOPHAGOGASTRODUODENOSCOPY (EGD) (N/A, 8/27/2018).    Arlyn has a current medication list which includes the following prescription(s): albuterol, albuterol sulfate, amlodipine, aspirin, atenolol, benzonatate, budesonide-formoterol 160-4.5 mcg, clonidine, diclofenac sodium, fluticasone, hydralazine, lisinopril, montelukast, pantoprazole, prednisone, sucralfate, and tiotropium.    Review of patient's allergies indicates:   Allergen Reactions    Codeine Nausea And Vomiting      Imaging, X-ray: 08/07/18  1. Mild glenohumeral degenerative osteoarthrosis.  2. Moderate AC joint degenerative osteoarthrosis.    Prior Therapy: none   Social History:  lives with their  family  Occupation: sports referee  Prior Level of Function: independent ADL's with no mobility issues  Current Level of Function: pain limiting LUE function    Pain:  Current 7/10, worst 10/10, best 4/10   Location: shoulder  left  Description: Aching, Burning, Sharp and Variable  Aggravating Factors: Lifting, lying on right side, sometimes for no reason at all, reaching, lifting overhead  Easing Factors: nothing    Pts goals: Decrease pain and improve function of LUE.    Objective   Observation: Patient is well developed, well nourished male in NAD.  Postural examination/scapula alignment: Rounded shoulder, Head forward, Affected scapula depressed, Affected scapula abducted, Affected scapula downwardly rotated, Slouched posture, Increased thoracic kyphosis, Decreased cervical lordosis and RTC/scapular muscle atrophy evident.    Active / Passive Range of Motion:   Shoulder Left Right   Flexion 124/170 WFL's   Abduction 41/153 WFL's   ER at 90 L C-6/80 WFL's   IR Lat hip/60 WFL's     Upper Extremity Strength  (R) UE  (L) UE    Shoulder flexion: 5/5 Shoulder flexion: 4/5   Shoulder Abduction: 5/5 Shoulder abduction: 4-/5   Shoulder ER 5/5 Shoulder ER 4-/5   Shoulder IR 5/5 Shoulder IR 4/5   Biceps 5/5 Biceps 4-/5   Triceps 5/5 Triceps 5/5   Rhomboids 4/5 Rhomboids 4-/5     Special Tests:  AC Joint Left   AC Joint Compression Test pos   Empty Can Test pos   Drop Arm test neg   Subscaputlaris Lift Off pos   Hawkin's Kenndy pos   Neer's Test neg   Speed's test pos   Anterior Apprehension test neg   Sulcus Sign neg   Joint Mobility: decreased thoracic spine, posterior and caudal G-H jt  UE reflexes: intact and symmetrical bilaterally   Palpation: tenderness to palpation anterior and lateral shoulder, A-C joint  Sensation: gross sensation intact  Flexibility: decreased anterior/chest muscles    PT Evaluation Completed:Yes  Discussed Plan of Care with patient: Yes    TREATMENT   Home Exercises and Patient Education  Provided    Education provided re: postural correction  - progress towards goals   - role of therapy in multi - disciplinary team, goals for therapy  No spiritual or educational barriers to learning provided    Written Home Exercises Provided: no  Assessment   This is a 58 year old male referred to outpatient physical therapy and presents with a medical diagnosis of left shoulder pain and demonstrates limitations as described in the problem list. Pt will benefit from physcial therapy services in order to maximize pain free functional independence. The following goals were discussed with the patient and patient is in agreement with them as to be addressed in the treatment plan.     Pt prognosis is Good.   Pt will benefit from skilled outpatient Physical Therapy to address the deficits stated above and in the chart below, provide pt/family education, and to maximize pt's level of independence.     Plan of care discussed with patient: Yes  Pt's spiritual, cultural and educational needs considered and pt agreeable to plan of care and goals as stated below:     Anticipated Barriers for therapy: none identified    Medical necessity is demonstrated by the following IMPAIRMENTS/PROBLEM LIST:   1)Increase in pain level limiting function   2)muscle weakness   3)positive impingement   4)postural dysfunction   5)Lack of HEP    GOALS: Short Term Goals: 3 weeks  1.Report decreased left shoulder pain  <   / =  5 /10  to increase tolerance for ADL's and functional activities.  2. Increased AROM by > / = 15 degrees flex, 30 degrees abd, and 10 degrees IR/ER to increase LUE function.  3. Increased MMT for left shoulder by 1/2 muscle grade to increase tolerance for ADL and work activities.  4. Pt to demonstrate improved postural awareness during exercise with minimal cueing.  5. Pt to tolerate HEP to improve ROM and independence with ADL's    Long Term Goals: 6 weeks  1.Report decreased left shoulder pain  <   / =  2.5 /10  to  increase tolerance for ADL's and functional activities.   2.Pt to demonstrate functional AROM left shoulder for improved performance of reaching and overhead activities.  3.Increased MMT for left shoulder by 1 muscle  to increase tolerance for ADL and work activities.  4. Pt to report a decrease score from 68% to < / = 50% impairment on Quick Dash to demonstrate improved UE function.  5. Pt to be Independent with HEP to improve ROM and independence with ADL's.    Plan   Pt will be treated by physical therapy 2 times a week for 6 weeks for Pt Education, HEP, therapeutic exercises, neuromuscular re-education, joint mobilizations, modalities prn to achieve established goals. Darien may at times be seen by a PTA as part of the Rehab Team.     Cont PT for 6 weeks.     Sanjeev Silva, PT    I certify the need for these services furnished under this plan of treatment and while under my care.______________________________ Physician/Referring Practitioner  Date of Signature

## 2018-08-31 NOTE — DISCHARGE SUMMARY
Discharge Note        SUMMARY     Admit Date: 8/27/2018    Attending Physician: No att. providers found     Discharge Physician: No att. providers found    Discharge Date: 8/31/2018 11:38 AM      Hospital Course: Patient tolerated procedure well.     Disposition: Home or Self Care    Patient Instructions:   Discharge Medication List as of 8/27/2018  9:29 AM      START taking these medications    Details   pantoprazole (PROTONIX) 40 MG tablet Take 1 tablet (40 mg total) by mouth once daily., Starting Mon 8/27/2018, Until Tue 8/27/2019, Normal      sucralfate (CARAFATE) 1 gram tablet Take 1 tablet (1 g total) by mouth 4 (four) times daily., Starting Mon 8/27/2018, Normal         CONTINUE these medications which have NOT CHANGED    Details   albuterol 90 mcg/actuation inhaler Inhale 2 puffs into the lungs., Historical Med      albuterol sulfate 2.5 mg/0.5 mL Nebu Inhale 1 each into the lungs., Historical Med      amLODIPine (NORVASC) 10 MG tablet Take 10 mg by mouth., Historical Med      aspirin (ECOTRIN) 81 MG EC tablet Take 81 mg by mouth., Historical Med      atenolol (TENORMIN) 100 MG tablet Take 100 mg by mouth., Historical Med      benzonatate (TESSALON) 100 MG capsule Take 100 mg by mouth., Historical Med      budesonide-formoterol 160-4.5 mcg (SYMBICORT) 160-4.5 mcg/actuation HFAA Inhale 2 puffs into the lungs., Historical Med      cloNIDine (CATAPRES) 0.2 MG tablet Take 0.1 mg by mouth., Historical Med      diclofenac sodium (VOLTAREN) 1 % Gel Apply topically., Starting Thu 3/16/2017, Historical Med      fluticasone (FLONASE) 50 mcg/actuation nasal spray 1 spray by Nasal route., Historical Med      hydrALAZINE (APRESOLINE) 25 MG tablet Take 25 mg by mouth., Historical Med      lisinopril (PRINIVIL,ZESTRIL) 20 MG tablet Take 20 mg by mouth., Historical Med      montelukast (SINGULAIR) 10 mg tablet Take 10 mg by mouth., Historical Med      predniSONE (DELTASONE) 20 MG tablet Take 20 mg by mouth., Historical Med       tiotropium (SPIRIVA) 18 mcg inhalation capsule Inhale 18 mcg into the lungs., Historical Med             Discharge Procedure Orders (must include Diet, Follow-up, Activity):   Discharge Procedure Orders (must include Diet, Follow-up, Activity)   Diet general     Call MD for:  temperature >100.4     Call MD for:  persistent nausea and vomiting     Call MD for:  severe uncontrolled pain     Call MD for:  difficulty breathing, headache or visual disturbances     Call MD for:  redness, tenderness, or signs of infection (pain, swelling, redness, odor or green/yellow discharge around incision site)     Call MD for:  persistent dizziness or light-headedness        Follow Up:  Follow up as scheduled.  Resume routine diet.  Activity as tolerated.    No driving day of procedure.

## 2018-09-06 ENCOUNTER — CLINICAL SUPPORT (OUTPATIENT)
Dept: REHABILITATION | Facility: HOSPITAL | Age: 58
End: 2018-09-06
Payer: MEDICAID

## 2018-09-06 DIAGNOSIS — M62.81 MUSCLE WEAKNESS: ICD-10-CM

## 2018-09-06 PROCEDURE — 97010 HOT OR COLD PACKS THERAPY: CPT

## 2018-09-06 PROCEDURE — 97014 ELECTRIC STIMULATION THERAPY: CPT

## 2018-09-06 PROCEDURE — 97110 THERAPEUTIC EXERCISES: CPT

## 2018-09-06 NOTE — PROGRESS NOTES
"                            Physical Therapy Daily Treatment Note   Name: Arlyn Le 1960  MRN: [id number]    Visit Date: 9/6/2018  Visit #: 2 / 12  Authorization period Expiration: n/a    Plan of Care Expiration: 11/26/18  Precautions: standard    Time In: 10:45  Time Out: 12:00  Total 1:1 Treatment Time: 60 min    Treatment Diagnosis:   Encounter Diagnosis   Name Primary?    Muscle weakness      Physician: Rahel Land NP    Subjective   Pt reports:  He is having pain in both shoulders and knees today. Left shoulder was 8/10 when he woke up this morning but is 5/10 now after taking 3 ibuprofen.  Pain Scale:  5/10 on VAS currently, 5/10 on VAS post treatment  Pain Location: left shoulder   Objective   ARLYN received therapeutic exercises to develop strength, endurance, ROM, flexibility, posture and core stabilization for 35 minutes including:  Pulleys in POS x  3 minutes  UBE with 2 bars 3/3  Corner stretch 5" H x 5  B rows with GTB 2 x 10  B shld ext with GTB 2 x 10  IR/ER with GTB 2 x 10  Scapular retraction with YTB 2 x 10  Posture correct W position against wall with 3"H x 10  Supine T-spine mob with foam roll  Thoracic self mob supine with towel roll x 2 minutes  Supine upper cervical flex mob with towel roll 2 x 10    ARLYN received the following supervised modalities after being cleared for contradictions: IFC Electrical Stimulation:  Arlyn received IFC Electrical Stimulation for pain control applied to the left shoulder for 20 minutes with moist heat in sitting post exercise. Arlyn tolerated treatment well without any adverse effects.      Home Exercises and Education Provided     Education provided re: postural correction/care in relation to shoulder mobility  - progress towards goals   - role of therapy in multi - disciplinary team, goals for therapy  No spiritual or educational barriers to learning provided    Written Home Exercises Provided: shoulder shrugs, shld blade squeezes, neck " retraction, corner stretch, biceps stretch, thoracic self mob with towel roll supine, and upper cervical flex mob with towel roll supine. Exercises to be performed per written instructions provided.  Exercises were reviewed and DARIEN was able to demonstrate them prior to the end of the session.   Pt received a written copy of exercises to perform at home. DARIEN demonstrated good  understanding of the education provided.     Assessment   DARIEN completed exercises without complication requiring verbal and tactile cueing for posture and technique. Darien is progressing well towards his goals and no updates to goals at this time.     Pt prognosis is Good. Pt will continue to benefit from skilled outpatient physical therapy to address the deficits listed in the problem list chart on initial evaluation, provide pt/family education and to maximize pt's level of independence in the home and community environment.     Medical necessity is demonstrated by the impairments and functional limitations listed on the Initial Evaluation.     Anticipated barriers to physical therapy: none identified  Pt's spiritual, cultural and educational needs considered and pt agreeable to plan of care and goals.    Goals   Short Term Goals: 3 weeks  1.Report decreased left shoulder pain  <   / =  5 /10  to increase tolerance for ADL's and functional activities.  2. Increased AROM by > / = 15 degrees flex, 30 degrees abd, and 10 degrees IR/ER to increase LUE function.  3. Increased MMT for left shoulder by 1/2 muscle grade to increase tolerance for ADL and work activities.  4. Pt to demonstrate improved postural awareness during exercise with minimal cueing.  5. Pt to tolerate HEP to improve ROM and independence with ADL's     Long Term Goals: 6 weeks  1.Report decreased left shoulder pain  <   / =  2.5 /10  to increase tolerance for ADL's and functional activities.   2.Pt to demonstrate functional AROM left shoulder for improved performance of  reaching and overhead activities.  3.Increased MMT for left shoulder by 1 muscle  to increase tolerance for ADL and work activities.  4. Pt to report a decrease score from 68% to < / = 50% impairment on Quick Dash to demonstrate improved UE function.  5. Pt to be Independent with HEP to improve ROM and independence with ADL's.    Plan   Continue with established Plan of Care towards Physical Therapy goals.   Discussed Plan of Care with patient: Yes    Sanjeev Silva, PT

## 2018-09-12 ENCOUNTER — OFFICE VISIT (OUTPATIENT)
Dept: SURGERY | Facility: CLINIC | Age: 58
End: 2018-09-12
Payer: MEDICAID

## 2018-09-12 VITALS
HEART RATE: 65 BPM | DIASTOLIC BLOOD PRESSURE: 76 MMHG | SYSTOLIC BLOOD PRESSURE: 145 MMHG | TEMPERATURE: 97 F | OXYGEN SATURATION: 95 % | WEIGHT: 175 LBS | BODY MASS INDEX: 27.47 KG/M2 | RESPIRATION RATE: 18 BRPM | HEIGHT: 67 IN

## 2018-09-12 DIAGNOSIS — K63.5 SERRATED POLYP OF COLON: Primary | ICD-10-CM

## 2018-09-12 DIAGNOSIS — K22.70 BARRETT'S ESOPHAGUS WITHOUT DYSPLASIA: ICD-10-CM

## 2018-09-12 PROCEDURE — 99213 OFFICE O/P EST LOW 20 MIN: CPT | Mod: S$GLB,,, | Performed by: SURGERY

## 2018-09-15 NOTE — PROGRESS NOTES
"[facility]  Follow-up    Subjective:       Patient ID: Darien Le is a 58 y.o. male.    Chief Complaint: Follow-up (EGD/Colonoscopy (08/27/18))      HPI:  Darien Le is a 58 y.o. male presents today for follow-up examination after EGD and colonoscopy EGD showed mild chronic gastritis without evidence of Helicobacter pylori species.  EGD also showed a Schatzki ring.  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  Patient's colonoscopy showed 2 colon polyps.  Colon polyps were serrated sessile polyp and a tubular adenoma.  Patient now presents today for follow-up examination.    Review of Systems   Constitutional: Negative for appetite change, chills and fever.   HENT: Negative for congestion, dental problem and drooling.    Eyes: Negative for photophobia, discharge and itching.   Respiratory: Negative for apnea and chest tightness.    Cardiovascular: Negative for chest pain, palpitations and leg swelling.   Gastrointestinal: Negative for abdominal distention and abdominal pain.   Endocrine: Negative for cold intolerance and heat intolerance.   Genitourinary: Negative for difficulty urinating and dysuria.   Musculoskeletal: Negative for arthralgias and back pain.   Skin: Negative for color change and pallor.   Neurological: Negative for dizziness, facial asymmetry and headaches.   Hematological: Negative for adenopathy. Does not bruise/bleed easily.   Psychiatric/Behavioral: Negative for agitation, behavioral problems and confusion.       Objective:      Vitals:    09/12/18 0935   BP: (!) 145/76   BP Location: Right arm   Patient Position: Sitting   Pulse: 65   Resp: 18   Temp: 97 °F (36.1 °C)   TempSrc: Oral   SpO2: 95%   Weight: 79.4 kg (175 lb)   Height: 5' 7" (1.702 m)     Physical Exam   Constitutional: He is oriented to person, place, and time. He appears well-developed and well-nourished. "   HENT:   Head: Normocephalic and atraumatic.   Eyes: EOM are normal. Pupils are equal, round, and reactive to light.   Neck: Normal range of motion. Neck supple. No thyromegaly present.   Cardiovascular: Normal rate and regular rhythm.   No murmur heard.  Pulmonary/Chest: Effort normal and breath sounds normal. No respiratory distress.   Abdominal: Soft. Bowel sounds are normal. He exhibits no distension. There is no tenderness.   Musculoskeletal: Normal range of motion. He exhibits no edema.   Neurological: He is alert and oriented to person, place, and time. No cranial nerve deficit.   Skin: Skin is warm. Capillary refill takes less than 2 seconds. No rash noted. He is not diaphoretic. No erythema.   Psychiatric: He has a normal mood and affect.       Lab Review:   CBC:   Lab Results   Component Value Date    WBC 10.58 08/24/2018    RBC 4.87 08/24/2018    HGB 14.5 08/24/2018    HCT 41.5 08/24/2018     08/24/2018     BMP:   Lab Results   Component Value Date     08/24/2018     08/24/2018    K 3.5 08/24/2018     08/24/2018    CO2 24 08/24/2018    BUN 16 08/24/2018    CREATININE 0.9 08/24/2018    CALCIUM 9.4 08/24/2018     Diagnostics Review: Pathology reviewed from recent endoscopy.     Assessment:       1. Serrated polyp of colon    2. 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  Recommendations will be for a 2 year follow-up EGD with biopsy for Portillo's.  Further recommendations will be for continued Protonix therapy to decrease risk of acid reflux and decrease the risk of continued conversion of Portillo's to dysplasia.  Colonoscopy showed 2 polyps.  Both completely removed. These polyps 1 was a serrated polyp 1 was a tubular adenoma.  Recommendations will be for a 2 year follow-up colonoscopy as well.  Follow up with surgery in 2 years for repeat endoscopy.

## 2018-09-17 ENCOUNTER — CLINICAL SUPPORT (OUTPATIENT)
Dept: REHABILITATION | Facility: HOSPITAL | Age: 58
End: 2018-09-17
Payer: MEDICAID

## 2018-09-17 DIAGNOSIS — M62.81 MUSCLE WEAKNESS: ICD-10-CM

## 2018-09-17 PROCEDURE — 97014 ELECTRIC STIMULATION THERAPY: CPT

## 2018-09-17 PROCEDURE — 97010 HOT OR COLD PACKS THERAPY: CPT

## 2018-09-17 PROCEDURE — 97110 THERAPEUTIC EXERCISES: CPT

## 2018-09-17 NOTE — PROGRESS NOTES
"                            Physical Therapy Daily Treatment Note   Name: Arlyn Le 1960  MRN: [id number]    Visit Date: 9/17/2018  Visit #: 3 / 12  Authorization period Expiration: n/a    Plan of Care Expiration: 11/26/18  Precautions: standard    Time In: 11:55  Time Out: 12:00  Total 1:1 Treatment Time: 60 min    Treatment Diagnosis:   Encounter Diagnosis   Name Primary?    Muscle weakness      Physician: Rahel Land NP    Subjective   Pt reports:  He is having pain in both shoulders and knees today. Left shoulder was 8/10 when he woke up this morning but is 5/10 now after taking 3 ibuprofen.  Pain Scale:  5/10 on VAS currently, 5/10 on VAS post treatment  Pain Location: left shoulder   Objective   ARLYN received therapeutic exercises to develop strength, endurance, ROM, flexibility, posture and core stabilization for 35 minutes including:  Pulleys in POS x  3 minutes  UBE with 2 bars 3/3  Corner stretch 5" H x 5  B rows with GTB 2 x 10  B shld ext with GTB 2 x 10  IR/ER with GTB 2 x 10  Scapular retraction with YTB 2 x 10  Posture correct W position against wall with 3"H x 10  Supine T-spine mob with foam roll  Thoracic self mob supine with towel roll x 2 minutes  Supine upper cervical flex mob with towel roll 2 x 10    ARLYN received the following supervised modalities after being cleared for contradictions: IFC Electrical Stimulation:  Arlyn received IFC Electrical Stimulation for pain control applied to the left shoulder for 20 minutes with moist heat in sitting post exercise. Arlyn tolerated treatment well without any adverse effects.      Home Exercises and Education Provided     Education provided re: postural correction/care in relation to shoulder mobility  - progress towards goals   - role of therapy in multi - disciplinary team, goals for therapy  No spiritual or educational barriers to learning provided    Written Home Exercises Provided: shoulder shrugs, shld blade squeezes, neck " retraction, corner stretch, biceps stretch, thoracic self mob with towel roll supine, and upper cervical flex mob with towel roll supine. Exercises to be performed per written instructions provided.  Exercises were reviewed and DARIEN was able to demonstrate them prior to the end of the session.   Pt received a written copy of exercises to perform at home. DARIEN demonstrated good  understanding of the education provided.     Assessment   DARIEN completed exercises without complication requiring verbal and tactile cueing for posture and technique. Darien is progressing well towards his goals and no updates to goals at this time.     Pt prognosis is Good. Pt will continue to benefit from skilled outpatient physical therapy to address the deficits listed in the problem list chart on initial evaluation, provide pt/family education and to maximize pt's level of independence in the home and community environment.     Medical necessity is demonstrated by the impairments and functional limitations listed on the Initial Evaluation.     Anticipated barriers to physical therapy: none identified  Pt's spiritual, cultural and educational needs considered and pt agreeable to plan of care and goals.    Goals   Short Term Goals: 3 weeks  1.Report decreased left shoulder pain  <   / =  5 /10  to increase tolerance for ADL's and functional activities.  2. Increased AROM by > / = 15 degrees flex, 30 degrees abd, and 10 degrees IR/ER to increase LUE function.  3. Increased MMT for left shoulder by 1/2 muscle grade to increase tolerance for ADL and work activities.  4. Pt to demonstrate improved postural awareness during exercise with minimal cueing.  5. Pt to tolerate HEP to improve ROM and independence with ADL's     Long Term Goals: 6 weeks  1.Report decreased left shoulder pain  <   / =  2.5 /10  to increase tolerance for ADL's and functional activities.   2.Pt to demonstrate functional AROM left shoulder for improved performance of  reaching and overhead activities.  3.Increased MMT for left shoulder by 1 muscle  to increase tolerance for ADL and work activities.  4. Pt to report a decrease score from 68% to < / = 50% impairment on Quick Dash to demonstrate improved UE function.  5. Pt to be Independent with HEP to improve ROM and independence with ADL's.    Plan   Continue with established Plan of Care towards Physical Therapy goals.   Discussed Plan of Care with patient: Yes    Rikki Ritter, PTA

## 2018-10-10 ENCOUNTER — HOSPITAL ENCOUNTER (OUTPATIENT)
Dept: RADIOLOGY | Facility: HOSPITAL | Age: 58
Discharge: HOME | End: 2018-10-10
Attending: NURSE PRACTITIONER
Payer: MEDICAID

## 2018-10-10 DIAGNOSIS — J44.1 COPD WITH ACUTE EXACERBATION: Primary | ICD-10-CM

## 2018-10-10 DIAGNOSIS — J44.1 COPD WITH ACUTE EXACERBATION: ICD-10-CM

## 2018-10-10 PROCEDURE — 71046 X-RAY EXAM CHEST 2 VIEWS: CPT | Mod: TC,FY

## 2018-10-10 PROCEDURE — 71046 X-RAY EXAM CHEST 2 VIEWS: CPT | Mod: 26,,, | Performed by: RADIOLOGY

## 2018-11-09 DIAGNOSIS — M62.831 MUSCLE SPASM OF CALF: Primary | ICD-10-CM

## 2018-11-12 ENCOUNTER — HOSPITAL ENCOUNTER (OUTPATIENT)
Dept: RADIOLOGY | Facility: HOSPITAL | Age: 58
Discharge: HOME | End: 2018-11-12
Attending: NURSE PRACTITIONER
Payer: MEDICAID

## 2018-11-12 DIAGNOSIS — M62.831 MUSCLE SPASM OF CALF: ICD-10-CM

## 2018-11-12 PROCEDURE — 93970 EXTREMITY STUDY: CPT | Mod: 26,,, | Performed by: RADIOLOGY

## 2018-11-12 PROCEDURE — 93970 EXTREMITY STUDY: CPT | Mod: TC

## 2019-01-04 RX ORDER — PANTOPRAZOLE SODIUM 40 MG/1
40 TABLET, DELAYED RELEASE ORAL DAILY
Qty: 90 TABLET | Refills: 3 | Status: SHIPPED | OUTPATIENT
Start: 2019-01-04 | End: 2020-02-02

## 2019-11-12 DIAGNOSIS — M94.262 CHONDROMALACIA OF LEFT KNEE: ICD-10-CM

## 2019-11-12 DIAGNOSIS — S83.207D TEAR OF MENISCUS OF LEFT KNEE, SUBSEQUENT ENCOUNTER: Primary | ICD-10-CM

## 2019-11-22 ENCOUNTER — CLINICAL SUPPORT (OUTPATIENT)
Dept: REHABILITATION | Facility: HOSPITAL | Age: 59
End: 2019-11-22
Payer: MEDICAID

## 2019-11-22 DIAGNOSIS — G89.29 CHRONIC PAIN OF LEFT KNEE: Primary | ICD-10-CM

## 2019-11-22 DIAGNOSIS — M25.562 CHRONIC PAIN OF LEFT KNEE: Primary | ICD-10-CM

## 2019-11-22 DIAGNOSIS — M62.81 MUSCLE WEAKNESS: ICD-10-CM

## 2019-11-22 PROCEDURE — 97161 PT EVAL LOW COMPLEX 20 MIN: CPT | Mod: PN

## 2019-11-22 NOTE — PLAN OF CARE
Physical Therapy Evaluation/Plan of Care    Name: Darien Le  Clinic Number: [id number]    Therapy Diagnosis:   Encounter Diagnoses   Name Primary?    Muscle weakness     Chronic pain of left knee Yes     Physician: Rich Plata MD    Physician Orders: PT Eval and Treat   Medical Diagnosis: Left Knee ATS - meniscal tear and debridement   Evaluation Date: 11/22/2019  Authorization period Expiration: 12/31/2019  Plan of Care Certification Period: 12/31/2019    Visit #: 1/ Visits authorized: 12  Time In:  8:00 AM   Time Out: 9:00 AM   Total Billable Time: 50 minutes    Precautions: Standard      Subjective   Date of onset: chronic   Date of Surgery: 10/24/2019        Past Medical History:   Diagnosis Date    Allergy     COPD (chronic obstructive pulmonary disease)     Hypertension      Darien Le  has a past surgical history that includes Neck surgery; Colonoscopy (N/A, 8/27/2018); and Esophagogastroduodenoscopy (N/A, 8/27/2018).  Cardiac cath right femoral; left carotid stenosis; Aortic stenosis;     Darien has a current medication list which includes the following prescription(s): albuterol, albuterol sulfate, amlodipine, aspirin, atenolol, benzonatate, budesonide-formoterol 160-4.5 mcg, clonidine, diclofenac sodium, fluticasone propionate, hydralazine, lisinopril, montelukast, pantoprazole, prednisone, sucralfate, and tiotropium.    Review of patient's allergies indicates:   Allergen Reactions    Codeine Nausea And Vomiting        Imaging, : none available     Prior Therapy: no previous physical therapy  for current condition  Social History: Patient lives in a single level home with 0 steps to enter ; lives with spouse; does have a cane at home   Occupation: disabled;   Prior Level of Function: independent;   Current Level of Function: independent;     Pain:  Current 1/10, worst 4/10, best 0/10   Location: knee  left  Description: Aching  Aggravating Factors: Sitting and Walking  Easing  Factors: rest      Onset/LYNETTE: gradual; has bilateral knee pain; left knee surgery was about 3 weeks ago - debridement of meniscal tear and chondromalcia; ; Scheduled for right knee ATS on December 10th - debridement/clean-up of arthritis.     Primary concern/ Chief complaints:  History of current condition - DARIEN reports: long  history of symptoms, including knee pain, difficulty walking; loss of AROM; He reports limited activity secondary to severe COPD; he uses oxygen at night - 2L - but stated that he doesn't use it all of the time; he does check his 02 sats at home. Currently not working;     Pts goals: To relieve knee pain     Objective     Observation: Darien ambulated into clinic; slight antalgia on right LE; decreased knee extension bilaterally on heel strike;     Posture: varus knees; hip flexor tightness bilaterally       Edema: no edema; does have some mild quad mm atrophy     Girth Measurement Joint line 5 cm below 10 cm above   Left 36.5 cm 32.5 cm 39.8 cm   Right 35.7 cm 33.5 cm 40.5 cm     Range of Motion:   Knee Left active Left Passive Right Active R passive   Flexion 142 142 145 145   Extension 2 1 8 6       Lower Extremity Strength  Right LE  Left LE    Knee extension: 5/5 Knee extension: 5/5   Knee flexion: 4+/5 Knee flexion: 4+/5   Hip flexion: 5/5 Hip flexion: 4+/5   Hip extension:  4+/5 Hip extension: 4+/5   Hip abduction: 5/5 Hip abduction: 5/5   Hip adduction: 5/5 Hip adduction 5/5   Ankle dorsiflexion: 5/5 Ankle dorsiflexion: 5/5   Ankle plantarflexion: 5/5 Ankle plantarflexion: 5/5       Special Tests:   Left Right   Valgus Stress Test Negative Negative   Varus Stress test Negative    Negative      Lachman's test Negative    Negative      Posterior Lachman Negative    Negative      Fernando's Test Negative    Positive       Apley's Compression Negative     Positive       Apley's Distraction Negative       Negative   Rodriguez's compression test Negative Negative   Thessaly's Test Negative  Negative   Patellar Grind Test Positive Positive     Step down test: Positve on both right and left - decreased eccentric control noted bilaterally     Function:    - SLS R: < 10 secs   - SLS L: < 6 secs   - Squat: able to squat without increased pressure on knees    - Sit <--> Stand: able to perform without difficulty; primary limitation is SOB from COPD      Joint Mobility: Patellar unrestricted,   Tibiofemoral restricted,     Palpation: no tenderness to palpation at left knee       Sensation: intact to light touch BLE's     Flexibility:    90/90 SLR = R moderate restriction, L moderate restriction   Ely's test: R no restriction, L no restriction   Hayde's test: R no restriction, L no restriction   Geovany test: R minimal restriction, L minimal restriction    PT Evaluation Completed: Yes  Discussed Plan of Care with patient: Yes      TREATMENT   DARIEN received therapeutic exercises to develop strength and ROM for 15 minutes including:  Recumbent bike; quad sets       Home Exercises and Patient Education Provided    Education provided re:   - progress towards goals   - role of therapy in multi - disciplinary team, goals for therapy  Pt educated on condition, POC, and expectations in therapy.  No spiritual or educational barriers to learning provided    Home exercises:  Pt will be provided HEP during course of treatment with progressions as appropriate. Pt was advised to perform these exercises free of pain, and to stop performing them if pain occurs.   DARIEN demonstrated good  understanding of the education provided.       Functional Limitations Reports - G Codes  Category: Mobility, Body position, Carrying, Self care, Other  Tool: LEFS  Score: 64% limitation   Current:CL = least 60% but < 80% impaired, limited or restricted  Goal:CJ = at least 20% but < 40% impaired, limited or restricted      Assessment   Darien is a 59 y.o. male referred to outpatient physical therapy and presents to PT with post-op left knee ATS  .  Patient demonstrates limitations as described in the problem list. Pt will benefit from physcial therapy services in order to maximize pain free and/or functional use of left LE. The following goals were discussed with the patient and patient is in agreement with them as to be addressed in the treatment plan.   Pt prognosis is Excellent.   Pt will benefit from skilled outpatient Physical Therapy to address the deficits stated above and in the chart below, provide pt/family education, and to maximize pt's level of independence.     Plan of care discussed with patient: Yes  Pt's spiritual, cultural and educational needs considered and pt agreeable to plan of care and goals as stated below:     Anticipated Barriers for therapy: COPD    Medical necessity is demonstrated by the following IMPAIRMENTS/PROBLEM LIST:    weakness, impaired endurance, decreased lower extremity function, pain and decreased ROM    GOALS:     Long Term Goals: 6 weeks  Pain: Decrease pain to 0/10 to allow for improved ability to perform daily activitiies   Strength: Improve strength in left knee to 5/5 for improved LE stability  ROM: Improve ROM to 100% of normal limits   Functional scale: Improve score on LEFS to <  Lifting: Lift 25 lbs to waist level, and carry for 30 feet without pain or compensation  Walking: Increase walking distance/duration to 30 mins  without pain  Postures: Increase sitting and/or standing duration to 30 mins  without pain   Transfers: Perform all transfers without increased pain or limitation  Exercise: demonstrate independence with home exercise program to maintain gains made in therapy.          Plan   Certification Period: 11/22/2019 to 12/31/2019 .    Outpatient physical therapy 2 times weekly to include: Gait Training, Manual Therapy, Moist Heat/ Ice, Neuromuscular Re-ed, Patient Education and Therapeutic Exercise. Cont PT for 6 weeks.   Pt may be seen by PTA as part of the rehabilitation team.     I certify the  need for these services furnished under this plan of treatment and while under my care.    Liseth De La Rosa, PT          Attestation:   I have seen the patient, reviewed the therapist's plan of care, and I agree with the plan of care.   I certify the need for these services furnished under this plan of treatment and while under my care.         _______________            ________                                               _____________________  Physician/Referring Practitioner                                                            Date of Signature

## 2019-12-03 ENCOUNTER — CLINICAL SUPPORT (OUTPATIENT)
Dept: REHABILITATION | Facility: HOSPITAL | Age: 59
End: 2019-12-03
Payer: MEDICAID

## 2019-12-03 DIAGNOSIS — M62.81 MUSCLE WEAKNESS: ICD-10-CM

## 2019-12-03 DIAGNOSIS — G89.29 CHRONIC PAIN OF LEFT KNEE: Primary | ICD-10-CM

## 2019-12-03 DIAGNOSIS — M25.562 CHRONIC PAIN OF LEFT KNEE: Primary | ICD-10-CM

## 2019-12-03 PROCEDURE — 97110 THERAPEUTIC EXERCISES: CPT | Mod: PN

## 2019-12-03 NOTE — PROGRESS NOTES
"                                                    Physical Therapy Daily Note     Name: Darien Le  Clinic Number: [id number]  Diagnosis:   Encounter Diagnoses   Name Primary?    Chronic pain of left knee Yes    Muscle weakness      Physician: Rich Plata MD  Precautions: Standard  Visit #: 2 of 12  PTA Visit #: 1  Time In: 9:00 AM  Time Out: 9:30 AM    Subjective     Pt reports: No new c/o's; "I am only going to do about 30 minutes today. I am still a little weak from being sick last week."  Pain Scale: Darien rates pain on a scale of 0-10 to be 3 currently.    Objective     Darien received individual therapeutic exercises to develop strength, endurance and ROM for 30 minutes including:  Recumbent Bike level 5 x 20 mins    Darien received the following manual therapy techniques:  were applied to the:  for  minutes including:       The patient received the following direct contact modalities after being cleared for contraindications:     The patient received the following supervised modalities after being cleared for contradictions:     Written Home Exercises Provided:   Pt demo good understanding of the education provided. Darien demonstrated good return demonstration of activities.     Education provided re:  Darien verbalized good understanding of education provided.   No spiritual or educational barriers to learning provided    Assessment     Patient only rode the bike and ended the session at that point; will progress as tolerated to improve strength and mobility.   This is a 59 y.o. male referred to outpatient physical therapy and presents with a medical diagnosis of S/P left knee ATS and demonstrates limitations as described in the problem list. Pt prognosis is Good. Pt will continue to benefit from skilled outpatient physical therapy to address the deficits listed in the problem list, provide pt/family education and to maximize pt's level of independence in the home and community environment.     LONG " TERM GOALS:  Long Term Goals: 6 weeks  Pain: Decrease pain to 0/10 to allow for improved ability to perform daily activitiies   Strength: Improve strength in left knee to 5/5 for improved LE stability  ROM: Improve ROM to 100% of normal limits   Functional scale: Improve score on LEFS to <  Lifting: Lift 25 lbs to waist level, and carry for 30 feet without pain or compensation  Walking: Increase walking distance/duration to 30 mins  without pain  Postures: Increase sitting and/or standing duration to 30 mins  without pain   Transfers: Perform all transfers without increased pain or limitation  Exercise: demonstrate independence with home exercise program to maintain gains made in therapy.       Plan     Continue with established Plan of Care towards PT goals.    Therapist: Jonathan Favre, PTA  12/3/2019

## 2020-01-06 DIAGNOSIS — S83.207D TEAR OF MENISCUS OF LEFT KNEE, SUBSEQUENT ENCOUNTER: Primary | ICD-10-CM

## 2020-01-06 DIAGNOSIS — M94.262 CHONDROMALACIA OF LEFT KNEE: ICD-10-CM

## 2020-02-02 RX ORDER — PANTOPRAZOLE SODIUM 40 MG/1
TABLET, DELAYED RELEASE ORAL
Qty: 30 TABLET | Refills: 6 | Status: SHIPPED | OUTPATIENT
Start: 2020-02-02 | End: 2020-09-14

## 2020-06-29 DIAGNOSIS — I65.23 OCCLUSION AND STENOSIS OF BILATERAL CAROTID ARTERIES: Primary | ICD-10-CM

## 2020-06-29 DIAGNOSIS — I70.213 ATHEROSCLEROSIS OF NATIVE ARTERIES OF EXTREMITIES WITH INTERMITTENT CLAUDICATION, BILATERAL LEGS: ICD-10-CM

## 2020-07-20 ENCOUNTER — HOSPITAL ENCOUNTER (OUTPATIENT)
Dept: RADIOLOGY | Facility: HOSPITAL | Age: 60
Discharge: HOME | End: 2020-07-20
Attending: SURGERY
Payer: MEDICAID

## 2020-07-20 DIAGNOSIS — I70.213 ATHEROSCLEROSIS OF NATIVE ARTERIES OF EXTREMITIES WITH INTERMITTENT CLAUDICATION, BILATERAL LEGS: ICD-10-CM

## 2020-07-20 DIAGNOSIS — I65.23 OCCLUSION AND STENOSIS OF BILATERAL CAROTID ARTERIES: ICD-10-CM

## 2020-07-20 PROCEDURE — 93922 UPR/L XTREMITY ART 2 LEVELS: CPT | Mod: 26,,, | Performed by: RADIOLOGY

## 2020-07-20 PROCEDURE — 93922 UPR/L XTREMITY ART 2 LEVELS: CPT | Mod: TC

## 2020-07-20 PROCEDURE — 93880 EXTRACRANIAL BILAT STUDY: CPT | Mod: TC

## 2020-07-20 PROCEDURE — 93880 EXTRACRANIAL BILAT STUDY: CPT | Mod: 26,,, | Performed by: RADIOLOGY

## 2020-07-20 PROCEDURE — 93925 US ARTERIAL LOWER EXTREMITY BILAT WITH ABI (XPD): ICD-10-PCS | Mod: 26,,, | Performed by: RADIOLOGY

## 2020-07-20 PROCEDURE — 93922 US ARTERIAL LOWER EXTREMITY BILAT WITH ABI (XPD): ICD-10-PCS | Mod: 26,,, | Performed by: RADIOLOGY

## 2020-07-20 PROCEDURE — 93925 LOWER EXTREMITY STUDY: CPT | Mod: 26,,, | Performed by: RADIOLOGY

## 2020-07-20 PROCEDURE — 93880 US CAROTID BILATERAL: ICD-10-PCS | Mod: 26,,, | Performed by: RADIOLOGY

## 2021-06-07 DIAGNOSIS — Z87.891 PERSONAL HISTORY OF TOBACCO USE, PRESENTING HAZARDS TO HEALTH: Primary | ICD-10-CM

## 2021-06-09 ENCOUNTER — HOSPITAL ENCOUNTER (OUTPATIENT)
Dept: RADIOLOGY | Facility: HOSPITAL | Age: 61
Discharge: HOME | End: 2021-06-09
Attending: INTERNAL MEDICINE
Payer: MEDICAID

## 2021-06-09 DIAGNOSIS — Z87.891 PERSONAL HISTORY OF TOBACCO USE, PRESENTING HAZARDS TO HEALTH: ICD-10-CM

## 2021-06-09 PROCEDURE — 71271 CT CHEST LUNG SCREENING LOW DOSE: ICD-10-PCS | Mod: 26,,, | Performed by: RADIOLOGY

## 2021-06-09 PROCEDURE — 71271 CT THORAX LUNG CANCER SCR C-: CPT | Mod: TC,PN

## 2021-06-09 PROCEDURE — 71271 CT THORAX LUNG CANCER SCR C-: CPT | Mod: 26,,, | Performed by: RADIOLOGY

## 2021-07-19 ENCOUNTER — TELEPHONE (OUTPATIENT)
Dept: SURGERY | Facility: CLINIC | Age: 61
End: 2021-07-19

## 2021-09-17 ENCOUNTER — TELEPHONE (OUTPATIENT)
Dept: SURGERY | Facility: CLINIC | Age: 61
End: 2021-09-17

## 2021-09-21 ENCOUNTER — OFFICE VISIT (OUTPATIENT)
Dept: SURGERY | Facility: CLINIC | Age: 61
End: 2021-09-21
Payer: MEDICAID

## 2021-09-21 VITALS
SYSTOLIC BLOOD PRESSURE: 142 MMHG | TEMPERATURE: 98 F | BODY MASS INDEX: 26.68 KG/M2 | WEIGHT: 170 LBS | DIASTOLIC BLOOD PRESSURE: 71 MMHG | OXYGEN SATURATION: 93 % | HEIGHT: 67 IN | HEART RATE: 64 BPM

## 2021-09-21 DIAGNOSIS — Z01.818 PRE-OP TESTING: ICD-10-CM

## 2021-09-21 DIAGNOSIS — M79.89 SOFT TISSUE MASS: Primary | ICD-10-CM

## 2021-09-21 PROCEDURE — 99215 OFFICE O/P EST HI 40 MIN: CPT | Mod: PBBFAC | Performed by: SURGERY

## 2021-09-21 PROCEDURE — 99999 PR PBB SHADOW E&M-EST. PATIENT-LVL V: CPT | Mod: PBBFAC,,, | Performed by: SURGERY

## 2021-09-21 PROCEDURE — 99999 PR PBB SHADOW E&M-EST. PATIENT-LVL V: ICD-10-PCS | Mod: PBBFAC,,, | Performed by: SURGERY

## 2021-09-21 PROCEDURE — 99204 PR OFFICE/OUTPT VISIT, NEW, LEVL IV, 45-59 MIN: ICD-10-PCS | Mod: S$PBB,,, | Performed by: SURGERY

## 2021-09-21 PROCEDURE — 99204 OFFICE O/P NEW MOD 45 MIN: CPT | Mod: S$PBB,,, | Performed by: SURGERY

## 2021-09-21 RX ORDER — LORATADINE 10 MG/1
TABLET ORAL
COMMUNITY
Start: 2019-08-08

## 2021-09-21 RX ORDER — SODIUM CHLORIDE 9 MG/ML
INJECTION, SOLUTION INTRAVENOUS CONTINUOUS
Status: CANCELLED | OUTPATIENT
Start: 2021-09-21

## 2021-09-21 RX ORDER — BUSPIRONE HYDROCHLORIDE 5 MG/1
5 TABLET ORAL 2 TIMES DAILY
COMMUNITY
Start: 2021-09-14

## 2021-10-19 ENCOUNTER — LAB VISIT (OUTPATIENT)
Dept: FAMILY MEDICINE | Facility: CLINIC | Age: 61
End: 2021-10-19
Payer: MEDICAID

## 2021-10-19 DIAGNOSIS — Z01.818 PRE-OP TESTING: ICD-10-CM

## 2021-10-19 PROCEDURE — U0003 INFECTIOUS AGENT DETECTION BY NUCLEIC ACID (DNA OR RNA); SEVERE ACUTE RESPIRATORY SYNDROME CORONAVIRUS 2 (SARS-COV-2) (CORONAVIRUS DISEASE [COVID-19]), AMPLIFIED PROBE TECHNIQUE, MAKING USE OF HIGH THROUGHPUT TECHNOLOGIES AS DESCRIBED BY CMS-2020-01-R: HCPCS | Performed by: SURGERY

## 2021-10-19 PROCEDURE — U0005 INFEC AGEN DETEC AMPLI PROBE: HCPCS | Performed by: SURGERY

## 2021-10-20 ENCOUNTER — HOSPITAL ENCOUNTER (OUTPATIENT)
Dept: CARDIOLOGY | Facility: HOSPITAL | Age: 61
Discharge: HOME | End: 2021-10-20
Attending: SURGERY
Payer: MEDICAID

## 2021-10-20 DIAGNOSIS — Z01.818 PRE-OP TESTING: ICD-10-CM

## 2021-10-20 LAB
SARS-COV-2 RNA RESP QL NAA+PROBE: NOT DETECTED
SARS-COV-2- CYCLE NUMBER: NORMAL

## 2021-10-20 PROCEDURE — 93005 ELECTROCARDIOGRAM TRACING: CPT

## 2021-10-20 PROCEDURE — 93010 EKG 12-LEAD: ICD-10-PCS | Mod: ,,, | Performed by: INTERNAL MEDICINE

## 2021-10-20 PROCEDURE — 93010 ELECTROCARDIOGRAM REPORT: CPT | Mod: ,,, | Performed by: INTERNAL MEDICINE

## 2021-10-22 ENCOUNTER — ANESTHESIA (OUTPATIENT)
Dept: SURGERY | Facility: HOSPITAL | Age: 61
End: 2021-10-22
Payer: MEDICAID

## 2021-10-22 ENCOUNTER — HOSPITAL ENCOUNTER (OUTPATIENT)
Facility: HOSPITAL | Age: 61
Discharge: HOME | End: 2021-10-22
Attending: SURGERY | Admitting: SURGERY
Payer: MEDICAID

## 2021-10-22 ENCOUNTER — ANESTHESIA EVENT (OUTPATIENT)
Dept: SURGERY | Facility: HOSPITAL | Age: 61
End: 2021-10-22
Payer: MEDICAID

## 2021-10-22 DIAGNOSIS — M79.89 SOFT TISSUE MASS: Primary | ICD-10-CM

## 2021-10-22 PROCEDURE — D9220A PRA ANESTHESIA: Mod: ,,, | Performed by: ANESTHESIOLOGY

## 2021-10-22 PROCEDURE — 36000706: Performed by: SURGERY

## 2021-10-22 PROCEDURE — 36000707: Performed by: SURGERY

## 2021-10-22 PROCEDURE — 25000003 PHARM REV CODE 250: Performed by: SURGERY

## 2021-10-22 PROCEDURE — 88304 TISSUE EXAM BY PATHOLOGIST: CPT | Mod: 26,,, | Performed by: STUDENT IN AN ORGANIZED HEALTH CARE EDUCATION/TRAINING PROGRAM

## 2021-10-22 PROCEDURE — 88307 TISSUE EXAM BY PATHOLOGIST: CPT | Performed by: STUDENT IN AN ORGANIZED HEALTH CARE EDUCATION/TRAINING PROGRAM

## 2021-10-22 PROCEDURE — 94640 AIRWAY INHALATION TREATMENT: CPT | Mod: 59

## 2021-10-22 PROCEDURE — 63600175 PHARM REV CODE 636 W HCPCS: Performed by: ANESTHESIOLOGY

## 2021-10-22 PROCEDURE — 71000015 HC POSTOP RECOV 1ST HR: Performed by: SURGERY

## 2021-10-22 PROCEDURE — 63600175 PHARM REV CODE 636 W HCPCS: Performed by: NURSE ANESTHETIST, CERTIFIED REGISTERED

## 2021-10-22 PROCEDURE — D9220A PRA ANESTHESIA: Mod: ,,, | Performed by: NURSE ANESTHETIST, CERTIFIED REGISTERED

## 2021-10-22 PROCEDURE — 11406 EXC TR-EXT B9+MARG >4.0 CM: CPT | Mod: ,,, | Performed by: SURGERY

## 2021-10-22 PROCEDURE — 37000009 HC ANESTHESIA EA ADD 15 MINS: Performed by: SURGERY

## 2021-10-22 PROCEDURE — 00300 ANES ALL PX INTEG H/N/PTRUNK: CPT | Performed by: SURGERY

## 2021-10-22 PROCEDURE — 25000003 PHARM REV CODE 250: Performed by: NURSE ANESTHETIST, CERTIFIED REGISTERED

## 2021-10-22 PROCEDURE — 25000242 PHARM REV CODE 250 ALT 637 W/ HCPCS

## 2021-10-22 PROCEDURE — 88304 PR  SURG PATH,LEVEL III: ICD-10-PCS | Mod: 26,,, | Performed by: STUDENT IN AN ORGANIZED HEALTH CARE EDUCATION/TRAINING PROGRAM

## 2021-10-22 PROCEDURE — D9220A PRA ANESTHESIA: ICD-10-PCS | Mod: ,,, | Performed by: NURSE ANESTHETIST, CERTIFIED REGISTERED

## 2021-10-22 PROCEDURE — 88304 TISSUE EXAM BY PATHOLOGIST: CPT | Performed by: STUDENT IN AN ORGANIZED HEALTH CARE EDUCATION/TRAINING PROGRAM

## 2021-10-22 PROCEDURE — 71000033 HC RECOVERY, INTIAL HOUR: Performed by: SURGERY

## 2021-10-22 PROCEDURE — D9220A PRA ANESTHESIA: ICD-10-PCS | Mod: ,,, | Performed by: ANESTHESIOLOGY

## 2021-10-22 PROCEDURE — 87076 CULTURE ANAEROBE IDENT EACH: CPT | Performed by: SURGERY

## 2021-10-22 PROCEDURE — 87070 CULTURE OTHR SPECIMN AEROBIC: CPT | Performed by: SURGERY

## 2021-10-22 PROCEDURE — 11406 PR EXC SKIN BENIG >4 CM TRUNK,ARM,LEG: ICD-10-PCS | Mod: ,,, | Performed by: SURGERY

## 2021-10-22 PROCEDURE — 37000008 HC ANESTHESIA 1ST 15 MINUTES: Performed by: SURGERY

## 2021-10-22 PROCEDURE — 87075 CULTR BACTERIA EXCEPT BLOOD: CPT | Performed by: SURGERY

## 2021-10-22 PROCEDURE — 25000003 PHARM REV CODE 250: Performed by: ANESTHESIOLOGY

## 2021-10-22 RX ORDER — MORPHINE SULFATE 2 MG/ML
2 INJECTION, SOLUTION INTRAMUSCULAR; INTRAVENOUS EVERY 5 MIN PRN
Status: DISCONTINUED | OUTPATIENT
Start: 2021-10-22 | End: 2021-10-22 | Stop reason: HOSPADM

## 2021-10-22 RX ORDER — MEPERIDINE HYDROCHLORIDE 50 MG/ML
INJECTION INTRAMUSCULAR; INTRAVENOUS; SUBCUTANEOUS
Status: DISCONTINUED | OUTPATIENT
Start: 2021-10-22 | End: 2021-10-22

## 2021-10-22 RX ORDER — PROPOFOL 10 MG/ML
VIAL (ML) INTRAVENOUS
Status: DISCONTINUED | OUTPATIENT
Start: 2021-10-22 | End: 2021-10-22

## 2021-10-22 RX ORDER — SODIUM CHLORIDE, SODIUM LACTATE, POTASSIUM CHLORIDE, CALCIUM CHLORIDE 600; 310; 30; 20 MG/100ML; MG/100ML; MG/100ML; MG/100ML
125 INJECTION, SOLUTION INTRAVENOUS CONTINUOUS
Status: DISCONTINUED | OUTPATIENT
Start: 2021-10-22 | End: 2021-10-22 | Stop reason: HOSPADM

## 2021-10-22 RX ORDER — SODIUM CHLORIDE 9 MG/ML
INJECTION, SOLUTION INTRAVENOUS CONTINUOUS
Status: DISCONTINUED | OUTPATIENT
Start: 2021-10-22 | End: 2021-10-22 | Stop reason: HOSPADM

## 2021-10-22 RX ORDER — FAMOTIDINE 10 MG/ML
20 INJECTION INTRAVENOUS ONCE
Status: COMPLETED | OUTPATIENT
Start: 2021-10-22 | End: 2021-10-22

## 2021-10-22 RX ORDER — ONDANSETRON 4 MG/1
4 TABLET, FILM COATED ORAL EVERY 6 HOURS PRN
Qty: 30 TABLET | Refills: 0 | Status: SHIPPED | OUTPATIENT
Start: 2021-10-22 | End: 2021-11-01

## 2021-10-22 RX ORDER — DIPHENHYDRAMINE HYDROCHLORIDE 50 MG/ML
12.5 INJECTION INTRAMUSCULAR; INTRAVENOUS
Status: DISCONTINUED | OUTPATIENT
Start: 2021-10-22 | End: 2021-10-22 | Stop reason: HOSPADM

## 2021-10-22 RX ORDER — BUPIVACAINE HYDROCHLORIDE 5 MG/ML
INJECTION, SOLUTION EPIDURAL; INTRACAUDAL
Status: DISCONTINUED | OUTPATIENT
Start: 2021-10-22 | End: 2021-10-22 | Stop reason: HOSPADM

## 2021-10-22 RX ORDER — OXYCODONE AND ACETAMINOPHEN 5; 325 MG/1; MG/1
1 TABLET ORAL EVERY 4 HOURS PRN
Qty: 30 TABLET | Refills: 0 | Status: SHIPPED | OUTPATIENT
Start: 2021-10-22 | End: 2021-11-01

## 2021-10-22 RX ORDER — MIDAZOLAM HYDROCHLORIDE 1 MG/ML
INJECTION, SOLUTION INTRAMUSCULAR; INTRAVENOUS
Status: DISCONTINUED | OUTPATIENT
Start: 2021-10-22 | End: 2021-10-22

## 2021-10-22 RX ORDER — LIDOCAINE HYDROCHLORIDE 10 MG/ML
1 INJECTION, SOLUTION EPIDURAL; INFILTRATION; INTRACAUDAL; PERINEURAL ONCE
Status: DISCONTINUED | OUTPATIENT
Start: 2021-10-22 | End: 2021-10-22 | Stop reason: HOSPADM

## 2021-10-22 RX ORDER — IPRATROPIUM BROMIDE AND ALBUTEROL SULFATE 2.5; .5 MG/3ML; MG/3ML
3 SOLUTION RESPIRATORY (INHALATION) ONCE
Status: COMPLETED | OUTPATIENT
Start: 2021-10-22 | End: 2021-10-22

## 2021-10-22 RX ORDER — IPRATROPIUM BROMIDE AND ALBUTEROL SULFATE 2.5; .5 MG/3ML; MG/3ML
SOLUTION RESPIRATORY (INHALATION)
Status: COMPLETED
Start: 2021-10-22 | End: 2021-10-22

## 2021-10-22 RX ORDER — SODIUM CHLORIDE, SODIUM LACTATE, POTASSIUM CHLORIDE, CALCIUM CHLORIDE 600; 310; 30; 20 MG/100ML; MG/100ML; MG/100ML; MG/100ML
INJECTION, SOLUTION INTRAVENOUS CONTINUOUS
Status: DISCONTINUED | OUTPATIENT
Start: 2021-10-22 | End: 2021-10-22 | Stop reason: HOSPADM

## 2021-10-22 RX ORDER — SUCCINYLCHOLINE CHLORIDE 20 MG/ML
INJECTION INTRAMUSCULAR; INTRAVENOUS
Status: DISCONTINUED | OUTPATIENT
Start: 2021-10-22 | End: 2021-10-22

## 2021-10-22 RX ORDER — LIDOCAINE HYDROCHLORIDE 20 MG/ML
INJECTION, SOLUTION EPIDURAL; INFILTRATION; INTRACAUDAL; PERINEURAL
Status: DISCONTINUED | OUTPATIENT
Start: 2021-10-22 | End: 2021-10-22

## 2021-10-22 RX ORDER — ONDANSETRON 2 MG/ML
4 INJECTION INTRAMUSCULAR; INTRAVENOUS DAILY PRN
Status: DISCONTINUED | OUTPATIENT
Start: 2021-10-22 | End: 2021-10-22 | Stop reason: HOSPADM

## 2021-10-22 RX ADMIN — IPRATROPIUM BROMIDE AND ALBUTEROL SULFATE 3 ML: .5; 3 SOLUTION RESPIRATORY (INHALATION) at 11:10

## 2021-10-22 RX ADMIN — LIDOCAINE HYDROCHLORIDE 100 MG: 20 INJECTION, SOLUTION EPIDURAL; INFILTRATION; INTRACAUDAL; PERINEURAL at 11:10

## 2021-10-22 RX ADMIN — CEFAZOLIN 2 G: 1 INJECTION, POWDER, FOR SOLUTION INTRAMUSCULAR; INTRAVENOUS; PARENTERAL at 11:10

## 2021-10-22 RX ADMIN — MIDAZOLAM HYDROCHLORIDE 2 MG: 1 INJECTION, SOLUTION INTRAMUSCULAR; INTRAVENOUS at 11:10

## 2021-10-22 RX ADMIN — PROPOFOL 200 MG: 10 INJECTION, EMULSION INTRAVENOUS at 11:10

## 2021-10-22 RX ADMIN — MEPERIDINE HYDROCHLORIDE 50 MG: 50 INJECTION INTRAMUSCULAR; INTRAVENOUS; SUBCUTANEOUS at 11:10

## 2021-10-22 RX ADMIN — SUCCINYLCHOLINE CHLORIDE 100 MG: 20 INJECTION, SOLUTION INTRAMUSCULAR; INTRAVENOUS at 11:10

## 2021-10-22 RX ADMIN — IPRATROPIUM BROMIDE AND ALBUTEROL SULFATE 3 ML: 2.5; .5 SOLUTION RESPIRATORY (INHALATION) at 11:10

## 2021-10-22 RX ADMIN — FAMOTIDINE 20 MG: 10 INJECTION INTRAVENOUS at 11:10

## 2021-10-22 RX ADMIN — SODIUM CHLORIDE, SODIUM LACTATE, POTASSIUM CHLORIDE, AND CALCIUM CHLORIDE: .6; .31; .03; .02 INJECTION, SOLUTION INTRAVENOUS at 11:10

## 2021-10-25 VITALS
SYSTOLIC BLOOD PRESSURE: 122 MMHG | OXYGEN SATURATION: 97 % | RESPIRATION RATE: 18 BRPM | BODY MASS INDEX: 26.68 KG/M2 | HEART RATE: 80 BPM | HEIGHT: 67 IN | TEMPERATURE: 98 F | WEIGHT: 170 LBS | DIASTOLIC BLOOD PRESSURE: 75 MMHG

## 2021-10-26 LAB — BACTERIA SPEC AEROBE CULT: NO GROWTH

## 2021-10-27 LAB
BACTERIA SPEC ANAEROBE CULT: ABNORMAL
BACTERIA SPEC ANAEROBE CULT: ABNORMAL

## 2021-11-01 LAB
FINAL PATHOLOGIC DIAGNOSIS: NORMAL
GROSS: NORMAL
Lab: NORMAL
MICROSCOPIC EXAM: NORMAL

## 2021-11-03 ENCOUNTER — TELEPHONE (OUTPATIENT)
Dept: SURGERY | Facility: CLINIC | Age: 61
End: 2021-11-03
Payer: MEDICAID

## 2021-11-04 ENCOUNTER — OFFICE VISIT (OUTPATIENT)
Dept: SURGERY | Facility: CLINIC | Age: 61
End: 2021-11-04
Payer: MEDICAID

## 2021-11-04 VITALS
HEART RATE: 60 BPM | HEIGHT: 67 IN | OXYGEN SATURATION: 96 % | WEIGHT: 176 LBS | TEMPERATURE: 98 F | BODY MASS INDEX: 27.62 KG/M2 | SYSTOLIC BLOOD PRESSURE: 137 MMHG | DIASTOLIC BLOOD PRESSURE: 65 MMHG

## 2021-11-04 DIAGNOSIS — Z98.890 POST-OPERATIVE STATE: Primary | ICD-10-CM

## 2021-11-04 PROCEDURE — 99024 PR POST-OP FOLLOW-UP VISIT: ICD-10-PCS | Mod: ,,, | Performed by: SURGERY

## 2021-11-04 PROCEDURE — 99999 PR PBB SHADOW E&M-EST. PATIENT-LVL V: CPT | Mod: PBBFAC,,, | Performed by: SURGERY

## 2021-11-04 PROCEDURE — 99999 PR PBB SHADOW E&M-EST. PATIENT-LVL V: ICD-10-PCS | Mod: PBBFAC,,, | Performed by: SURGERY

## 2021-11-04 PROCEDURE — 99215 OFFICE O/P EST HI 40 MIN: CPT | Mod: PBBFAC | Performed by: SURGERY

## 2021-11-04 PROCEDURE — 99024 POSTOP FOLLOW-UP VISIT: CPT | Mod: ,,, | Performed by: SURGERY

## 2021-11-04 RX ORDER — HYDROCODONE BITARTRATE AND ACETAMINOPHEN 5; 325 MG/1; MG/1
1 TABLET ORAL EVERY 6 HOURS PRN
Qty: 30 TABLET | Refills: 0 | Status: SHIPPED | OUTPATIENT
Start: 2021-11-04 | End: 2021-11-14

## 2021-11-18 ENCOUNTER — OFFICE VISIT (OUTPATIENT)
Dept: SURGERY | Facility: CLINIC | Age: 61
End: 2021-11-18
Payer: MEDICAID

## 2021-11-18 VITALS
HEART RATE: 87 BPM | BODY MASS INDEX: 27.31 KG/M2 | SYSTOLIC BLOOD PRESSURE: 136 MMHG | WEIGHT: 174 LBS | HEIGHT: 67 IN | OXYGEN SATURATION: 98 % | TEMPERATURE: 98 F | DIASTOLIC BLOOD PRESSURE: 72 MMHG

## 2021-11-18 DIAGNOSIS — Z09 POSTOP CHECK: Primary | ICD-10-CM

## 2021-11-18 PROCEDURE — 99214 OFFICE O/P EST MOD 30 MIN: CPT | Mod: PBBFAC | Performed by: SURGERY

## 2021-11-18 PROCEDURE — 99999 PR PBB SHADOW E&M-EST. PATIENT-LVL IV: CPT | Mod: PBBFAC,,, | Performed by: SURGERY

## 2021-11-18 PROCEDURE — 99024 POSTOP FOLLOW-UP VISIT: CPT | Mod: ,,, | Performed by: SURGERY

## 2021-11-18 PROCEDURE — 99999 PR PBB SHADOW E&M-EST. PATIENT-LVL IV: ICD-10-PCS | Mod: PBBFAC,,, | Performed by: SURGERY

## 2021-11-18 PROCEDURE — 99024 PR POST-OP FOLLOW-UP VISIT: ICD-10-PCS | Mod: ,,, | Performed by: SURGERY

## 2021-12-03 ENCOUNTER — HOSPITAL ENCOUNTER (OUTPATIENT)
Dept: RADIOLOGY | Facility: HOSPITAL | Age: 61
Discharge: HOME | End: 2021-12-03
Attending: NURSE PRACTITIONER
Payer: MEDICAID

## 2021-12-03 DIAGNOSIS — R42 DIZZINESS AND GIDDINESS: ICD-10-CM

## 2021-12-17 ENCOUNTER — OFFICE VISIT (OUTPATIENT)
Dept: SURGERY | Facility: CLINIC | Age: 61
End: 2021-12-17
Payer: MEDICAID

## 2021-12-17 VITALS
HEART RATE: 67 BPM | BODY MASS INDEX: 27.47 KG/M2 | WEIGHT: 175 LBS | SYSTOLIC BLOOD PRESSURE: 140 MMHG | OXYGEN SATURATION: 97 % | DIASTOLIC BLOOD PRESSURE: 76 MMHG | RESPIRATION RATE: 19 BRPM | HEIGHT: 67 IN

## 2021-12-17 DIAGNOSIS — Z09 POSTOP CHECK: Primary | ICD-10-CM

## 2021-12-17 PROCEDURE — 99999 PR PBB SHADOW E&M-EST. PATIENT-LVL IV: ICD-10-PCS | Mod: PBBFAC,,, | Performed by: SURGERY

## 2021-12-17 PROCEDURE — 99214 OFFICE O/P EST MOD 30 MIN: CPT | Mod: PBBFAC | Performed by: SURGERY

## 2021-12-17 PROCEDURE — 99024 PR POST-OP FOLLOW-UP VISIT: ICD-10-PCS | Mod: ,,, | Performed by: SURGERY

## 2021-12-17 PROCEDURE — 4010F ACE/ARB THERAPY RXD/TAKEN: CPT | Mod: CPTII,,, | Performed by: SURGERY

## 2021-12-17 PROCEDURE — 99999 PR PBB SHADOW E&M-EST. PATIENT-LVL IV: CPT | Mod: PBBFAC,,, | Performed by: SURGERY

## 2021-12-17 PROCEDURE — 4010F PR ACE/ARB THEARPY RXD/TAKEN: ICD-10-PCS | Mod: CPTII,,, | Performed by: SURGERY

## 2021-12-17 PROCEDURE — 99024 POSTOP FOLLOW-UP VISIT: CPT | Mod: ,,, | Performed by: SURGERY

## 2022-02-10 ENCOUNTER — HOSPITAL ENCOUNTER (OUTPATIENT)
Dept: RADIOLOGY | Facility: HOSPITAL | Age: 62
Discharge: HOME | End: 2022-02-10
Attending: OTOLARYNGOLOGY
Payer: MEDICAID

## 2022-02-10 DIAGNOSIS — J32.4 CHRONIC PANSINUSITIS: ICD-10-CM

## 2022-02-14 ENCOUNTER — HOSPITAL ENCOUNTER (OUTPATIENT)
Dept: CARDIOLOGY | Facility: HOSPITAL | Age: 62
Discharge: HOME | End: 2022-02-14
Attending: OTOLARYNGOLOGY
Payer: MEDICAID

## 2022-02-14 ENCOUNTER — LAB VISIT (OUTPATIENT)
Dept: FAMILY MEDICINE | Facility: CLINIC | Age: 62
End: 2022-02-14
Payer: MEDICAID

## 2022-02-14 ENCOUNTER — HOSPITAL ENCOUNTER (OUTPATIENT)
Dept: RADIOLOGY | Facility: HOSPITAL | Age: 62
Discharge: HOME | End: 2022-02-14
Attending: OTOLARYNGOLOGY
Payer: MEDICAID

## 2022-02-14 DIAGNOSIS — J34.2 DEVIATED NASAL SEPTUM: ICD-10-CM

## 2022-02-14 DIAGNOSIS — J34.2 DEVIATED NASAL SEPTUM: Primary | ICD-10-CM

## 2022-02-14 DIAGNOSIS — Z01.818 PRE-OP EXAM: Primary | ICD-10-CM

## 2022-02-14 PROCEDURE — 71046 X-RAY EXAM CHEST 2 VIEWS: CPT | Mod: TC,FY

## 2022-02-14 PROCEDURE — 71046 X-RAY EXAM CHEST 2 VIEWS: CPT | Mod: 26,,, | Performed by: RADIOLOGY

## 2022-02-14 PROCEDURE — 93005 ELECTROCARDIOGRAM TRACING: CPT

## 2022-02-14 PROCEDURE — 71046 XR CHEST PA AND LATERAL: ICD-10-PCS | Mod: 26,,, | Performed by: RADIOLOGY

## 2022-02-14 PROCEDURE — 93010 ELECTROCARDIOGRAM REPORT: CPT | Mod: ,,, | Performed by: INTERNAL MEDICINE

## 2022-02-14 PROCEDURE — U0005 INFEC AGEN DETEC AMPLI PROBE: HCPCS | Performed by: FAMILY MEDICINE

## 2022-02-14 PROCEDURE — 93010 EKG 12-LEAD: ICD-10-PCS | Mod: ,,, | Performed by: INTERNAL MEDICINE

## 2022-02-14 PROCEDURE — U0003 INFECTIOUS AGENT DETECTION BY NUCLEIC ACID (DNA OR RNA); SEVERE ACUTE RESPIRATORY SYNDROME CORONAVIRUS 2 (SARS-COV-2) (CORONAVIRUS DISEASE [COVID-19]), AMPLIFIED PROBE TECHNIQUE, MAKING USE OF HIGH THROUGHPUT TECHNOLOGIES AS DESCRIBED BY CMS-2020-01-R: HCPCS | Performed by: FAMILY MEDICINE

## 2022-02-14 NOTE — PROGRESS NOTES
Darien Le presented to clinic for COVID-19 swab.   Darien Le verified x2, name and .   Darien Le instructed on what will be completed, asked if ever had COVID-19 swab.   Explained procedure to Darien Le.   Specimen obtained.   No questions or concerns voiced further at this time.   Darien Le left in satisfactory condition.

## 2022-02-15 DIAGNOSIS — U07.1 COVID-19 VIRUS DETECTED: ICD-10-CM

## 2022-02-15 LAB
SARS-COV-2 RNA RESP QL NAA+PROBE: DETECTED
SARS-COV-2- CYCLE NUMBER: 36

## 2022-02-16 ENCOUNTER — NURSE TRIAGE (OUTPATIENT)
Dept: ADMINISTRATIVE | Facility: CLINIC | Age: 62
End: 2022-02-16
Payer: MEDICAID

## 2022-02-16 NOTE — LETTER
"February 16, 2022   This communication is flagged as high priority.          Hood Obando MD  [address] [facility] [place] [address]             [facility] - On Call  [address]  [facility] [address]  Phone: [phone]  Fax: [phone]   Patient: Darien Le   MR Number: [id number]   YOB: 1960   Date of Visit: 2/16/2022     Dear Dr. Obando,     Pt contacted for HSM escalation - Pt reports scheduled for sinus surgery in am but had a "false" positive covid 19 test and surgery was pushed back but pt states he retested at local urgent care and test was negative and he has no symptoms. Pt would like to keep post poned surgery and wants call back from provider.Denies any fever or SOB, pt able to speak in full sentences without noted SOB or cough. Info only  protocol used and pt advised to contact provider with in the hour. Nt able to send communication to ENT provider as high priority.  Pt instructed to call [facility] for worsening of symptoms or health questions.      Sincerely,      Susanne Winters RN            CC    No Recipients    Enclosure         "

## 2022-02-16 NOTE — TELEPHONE ENCOUNTER
"Pt contacted for HSM escalation - Pt reports scheduled for sinus surgery in am but had a "false" positive covid 19 test and surgery was pushed back but pt states he retested at local urgent care and test was negative and he has no symptoms. Pt would like to keep post poned surgery and wants call back from provider.Denies any fever or SOB, pt able to speak in full sentences without noted SOB or cough. Info only  protocol used and pt advised to contact provider with in the hour. Nt able to send communication to ENT provider as high priority.  Pt instructed to call [facility] for worsening of symptoms or health questions.    Reason for Disposition   [1] Follow-up call from patient regarding patient's clinical status AND [2] information urgent    Protocols used: ST PCP CALL - NO TRIAGE-A-AH      "

## 2022-03-08 ENCOUNTER — CLINICAL SUPPORT (OUTPATIENT)
Dept: FAMILY MEDICINE | Facility: CLINIC | Age: 62
End: 2022-03-08
Payer: MEDICAID

## 2022-03-08 DIAGNOSIS — Z01.818 PRE-OP TESTING: ICD-10-CM

## 2022-03-08 PROCEDURE — U0003 INFECTIOUS AGENT DETECTION BY NUCLEIC ACID (DNA OR RNA); SEVERE ACUTE RESPIRATORY SYNDROME CORONAVIRUS 2 (SARS-COV-2) (CORONAVIRUS DISEASE [COVID-19]), AMPLIFIED PROBE TECHNIQUE, MAKING USE OF HIGH THROUGHPUT TECHNOLOGIES AS DESCRIBED BY CMS-2020-01-R: HCPCS | Performed by: FAMILY MEDICINE

## 2022-03-08 PROCEDURE — U0005 INFEC AGEN DETEC AMPLI PROBE: HCPCS | Performed by: FAMILY MEDICINE

## 2022-03-08 NOTE — PROGRESS NOTES
---------- Darien Le presented to clinic for COVID-19 swab.   Darien Le verified x2, name and  on label    Explained COVID-19 swab procedure to Darien Le.   Specimen obtained and label placed on specimen while Darien Le was present  Darien Le was given time to ask / answer any questions.   Darien Le left in satisfactory condition./ NCORINE BROWN

## 2022-03-09 LAB
SARS-COV-2 RNA RESP QL NAA+PROBE: NOT DETECTED
SARS-COV-2- CYCLE NUMBER: NORMAL

## 2022-03-11 ENCOUNTER — ANESTHESIA (OUTPATIENT)
Dept: SURGERY | Facility: HOSPITAL | Age: 62
End: 2022-03-11
Payer: MEDICAID

## 2022-03-11 ENCOUNTER — ANESTHESIA EVENT (OUTPATIENT)
Dept: SURGERY | Facility: HOSPITAL | Age: 62
End: 2022-03-11
Payer: MEDICAID

## 2022-03-11 ENCOUNTER — HOSPITAL ENCOUNTER (OUTPATIENT)
Facility: HOSPITAL | Age: 62
Discharge: HOME | End: 2022-03-11
Attending: OTOLARYNGOLOGY | Admitting: OTOLARYNGOLOGY
Payer: MEDICAID

## 2022-03-11 LAB
GRAM STN SPEC: NORMAL
GRAM STN SPEC: NORMAL

## 2022-03-11 PROCEDURE — 00160 ANES PX NOSE&SINUS NOS: CPT | Performed by: OTOLARYNGOLOGY

## 2022-03-11 PROCEDURE — 37000009 HC ANESTHESIA EA ADD 15 MINS: Performed by: OTOLARYNGOLOGY

## 2022-03-11 PROCEDURE — 87102 FUNGUS ISOLATION CULTURE: CPT | Performed by: OTOLARYNGOLOGY

## 2022-03-11 PROCEDURE — 36000709 HC OR TIME LEV III EA ADD 15 MIN: Performed by: OTOLARYNGOLOGY

## 2022-03-11 PROCEDURE — 71000033 HC RECOVERY, INTIAL HOUR: Performed by: OTOLARYNGOLOGY

## 2022-03-11 PROCEDURE — 63600175 PHARM REV CODE 636 W HCPCS: Performed by: ANESTHESIOLOGY

## 2022-03-11 PROCEDURE — 37000008 HC ANESTHESIA 1ST 15 MINUTES: Performed by: OTOLARYNGOLOGY

## 2022-03-11 PROCEDURE — 27201423 OPTIME MED/SURG SUP & DEVICES STERILE SUPPLY: Performed by: OTOLARYNGOLOGY

## 2022-03-11 PROCEDURE — D9220A PRA ANESTHESIA: Mod: CRNA,,, | Performed by: NURSE ANESTHETIST, CERTIFIED REGISTERED

## 2022-03-11 PROCEDURE — D9220A PRA ANESTHESIA: Mod: ANES,,, | Performed by: ANESTHESIOLOGY

## 2022-03-11 PROCEDURE — 87075 CULTR BACTERIA EXCEPT BLOOD: CPT | Performed by: OTOLARYNGOLOGY

## 2022-03-11 PROCEDURE — 27000221 HC OXYGEN, UP TO 24 HOURS

## 2022-03-11 PROCEDURE — D9220A PRA ANESTHESIA: ICD-10-PCS | Mod: CRNA,,, | Performed by: NURSE ANESTHETIST, CERTIFIED REGISTERED

## 2022-03-11 PROCEDURE — 63600175 PHARM REV CODE 636 W HCPCS: Performed by: NURSE ANESTHETIST, CERTIFIED REGISTERED

## 2022-03-11 PROCEDURE — D9220A PRA ANESTHESIA: ICD-10-PCS | Mod: ANES,,, | Performed by: ANESTHESIOLOGY

## 2022-03-11 PROCEDURE — 25000003 PHARM REV CODE 250: Performed by: ANESTHESIOLOGY

## 2022-03-11 PROCEDURE — 25000003 PHARM REV CODE 250: Performed by: NURSE ANESTHETIST, CERTIFIED REGISTERED

## 2022-03-11 PROCEDURE — 36000708 HC OR TIME LEV III 1ST 15 MIN: Performed by: OTOLARYNGOLOGY

## 2022-03-11 PROCEDURE — 71000015 HC POSTOP RECOV 1ST HR: Performed by: OTOLARYNGOLOGY

## 2022-03-11 PROCEDURE — 25000242 PHARM REV CODE 250 ALT 637 W/ HCPCS

## 2022-03-11 PROCEDURE — 94640 AIRWAY INHALATION TREATMENT: CPT

## 2022-03-11 PROCEDURE — 87205 SMEAR GRAM STAIN: CPT | Performed by: OTOLARYNGOLOGY

## 2022-03-11 DEVICE — VENT TUBE 24442 10PK PAPA PAIR 1.02 SIL
Type: IMPLANTABLE DEVICE | Site: EAR | Status: FUNCTIONAL
Brand: PAPARELLA

## 2022-03-11 RX ORDER — PROPOFOL 10 MG/ML
VIAL (ML) INTRAVENOUS
Status: DISCONTINUED | OUTPATIENT
Start: 2022-03-11 | End: 2022-03-11

## 2022-03-11 RX ORDER — SUCCINYLCHOLINE CHLORIDE 20 MG/ML
INJECTION INTRAMUSCULAR; INTRAVENOUS
Status: DISCONTINUED | OUTPATIENT
Start: 2022-03-11 | End: 2022-03-11

## 2022-03-11 RX ORDER — LIDOCAINE HYDROCHLORIDE 20 MG/ML
INJECTION, SOLUTION EPIDURAL; INFILTRATION; INTRACAUDAL; PERINEURAL
Status: DISCONTINUED | OUTPATIENT
Start: 2022-03-11 | End: 2022-03-11

## 2022-03-11 RX ORDER — ROCURONIUM BROMIDE 10 MG/ML
INJECTION, SOLUTION INTRAVENOUS
Status: DISCONTINUED | OUTPATIENT
Start: 2022-03-11 | End: 2022-03-11

## 2022-03-11 RX ORDER — FAMOTIDINE 10 MG/ML
20 INJECTION INTRAVENOUS ONCE
Status: DISCONTINUED | OUTPATIENT
Start: 2022-03-11 | End: 2022-03-11 | Stop reason: HOSPADM

## 2022-03-11 RX ORDER — DIPHENHYDRAMINE HYDROCHLORIDE 50 MG/ML
12.5 INJECTION INTRAMUSCULAR; INTRAVENOUS
Status: DISCONTINUED | OUTPATIENT
Start: 2022-03-11 | End: 2022-03-11 | Stop reason: HOSPADM

## 2022-03-11 RX ORDER — LIDOCAINE HYDROCHLORIDE 10 MG/ML
1 INJECTION, SOLUTION EPIDURAL; INFILTRATION; INTRACAUDAL; PERINEURAL ONCE
Status: DISCONTINUED | OUTPATIENT
Start: 2022-03-11 | End: 2022-03-11 | Stop reason: HOSPADM

## 2022-03-11 RX ORDER — FAMOTIDINE 10 MG/ML
20 INJECTION INTRAVENOUS 2 TIMES DAILY
Status: DISCONTINUED | OUTPATIENT
Start: 2022-03-11 | End: 2022-03-11 | Stop reason: HOSPADM

## 2022-03-11 RX ORDER — SODIUM CHLORIDE, SODIUM LACTATE, POTASSIUM CHLORIDE, CALCIUM CHLORIDE 600; 310; 30; 20 MG/100ML; MG/100ML; MG/100ML; MG/100ML
125 INJECTION, SOLUTION INTRAVENOUS CONTINUOUS
Status: DISCONTINUED | OUTPATIENT
Start: 2022-03-11 | End: 2022-03-11 | Stop reason: HOSPADM

## 2022-03-11 RX ORDER — MIDAZOLAM HYDROCHLORIDE 1 MG/ML
INJECTION INTRAMUSCULAR; INTRAVENOUS
Status: DISCONTINUED | OUTPATIENT
Start: 2022-03-11 | End: 2022-03-11

## 2022-03-11 RX ORDER — IPRATROPIUM BROMIDE AND ALBUTEROL SULFATE 2.5; .5 MG/3ML; MG/3ML
SOLUTION RESPIRATORY (INHALATION)
Status: COMPLETED
Start: 2022-03-11 | End: 2022-03-11

## 2022-03-11 RX ORDER — SODIUM CHLORIDE, SODIUM LACTATE, POTASSIUM CHLORIDE, CALCIUM CHLORIDE 600; 310; 30; 20 MG/100ML; MG/100ML; MG/100ML; MG/100ML
INJECTION, SOLUTION INTRAVENOUS CONTINUOUS
Status: DISCONTINUED | OUTPATIENT
Start: 2022-03-11 | End: 2022-03-11 | Stop reason: HOSPADM

## 2022-03-11 RX ORDER — MORPHINE SULFATE 4 MG/ML
2 INJECTION, SOLUTION INTRAMUSCULAR; INTRAVENOUS EVERY 5 MIN PRN
Status: DISCONTINUED | OUTPATIENT
Start: 2022-03-11 | End: 2022-03-11 | Stop reason: HOSPADM

## 2022-03-11 RX ORDER — EPHEDRINE SULFATE 50 MG/ML
INJECTION, SOLUTION INTRAVENOUS
Status: DISCONTINUED | OUTPATIENT
Start: 2022-03-11 | End: 2022-03-11

## 2022-03-11 RX ORDER — MEPERIDINE HYDROCHLORIDE 50 MG/ML
INJECTION INTRAMUSCULAR; INTRAVENOUS; SUBCUTANEOUS
Status: DISCONTINUED | OUTPATIENT
Start: 2022-03-11 | End: 2022-03-11

## 2022-03-11 RX ORDER — FAMOTIDINE 10 MG/ML
INJECTION INTRAVENOUS
Status: DISCONTINUED
Start: 2022-03-11 | End: 2022-03-11 | Stop reason: HOSPADM

## 2022-03-11 RX ORDER — ONDANSETRON 2 MG/ML
4 INJECTION INTRAMUSCULAR; INTRAVENOUS DAILY PRN
Status: DISCONTINUED | OUTPATIENT
Start: 2022-03-11 | End: 2022-03-11 | Stop reason: HOSPADM

## 2022-03-11 RX ORDER — IPRATROPIUM BROMIDE AND ALBUTEROL SULFATE 2.5; .5 MG/3ML; MG/3ML
3 SOLUTION RESPIRATORY (INHALATION) ONCE
Status: COMPLETED | OUTPATIENT
Start: 2022-03-11 | End: 2022-03-11

## 2022-03-11 RX ORDER — ONDANSETRON 2 MG/ML
INJECTION INTRAMUSCULAR; INTRAVENOUS
Status: DISCONTINUED | OUTPATIENT
Start: 2022-03-11 | End: 2022-03-11

## 2022-03-11 RX ORDER — DEXAMETHASONE SODIUM PHOSPHATE 4 MG/ML
INJECTION, SOLUTION INTRA-ARTICULAR; INTRALESIONAL; INTRAMUSCULAR; INTRAVENOUS; SOFT TISSUE
Status: DISCONTINUED | OUTPATIENT
Start: 2022-03-11 | End: 2022-03-11

## 2022-03-11 RX ADMIN — SODIUM CHLORIDE, POTASSIUM CHLORIDE, SODIUM LACTATE AND CALCIUM CHLORIDE: 600; 310; 30; 20 INJECTION, SOLUTION INTRAVENOUS at 08:03

## 2022-03-11 RX ADMIN — EPHEDRINE SULFATE 20 MG: 50 INJECTION INTRAVENOUS at 10:03

## 2022-03-11 RX ADMIN — MEPERIDINE HYDROCHLORIDE 50 MG: 50 INJECTION INTRAMUSCULAR; INTRAVENOUS; SUBCUTANEOUS at 09:03

## 2022-03-11 RX ADMIN — SODIUM CHLORIDE, POTASSIUM CHLORIDE, SODIUM LACTATE AND CALCIUM CHLORIDE: 600; 310; 30; 20 INJECTION, SOLUTION INTRAVENOUS at 10:03

## 2022-03-11 RX ADMIN — IPRATROPIUM BROMIDE AND ALBUTEROL SULFATE 3 ML: 2.5; .5 SOLUTION RESPIRATORY (INHALATION) at 09:03

## 2022-03-11 RX ADMIN — LIDOCAINE HYDROCHLORIDE 100 MG: 20 INJECTION, SOLUTION EPIDURAL; INFILTRATION; INTRACAUDAL; PERINEURAL at 10:03

## 2022-03-11 RX ADMIN — PROPOFOL 200 MG: 10 INJECTION, EMULSION INTRAVENOUS at 09:03

## 2022-03-11 RX ADMIN — DEXAMETHASONE SODIUM PHOSPHATE 16 MG: 4 INJECTION, SOLUTION INTRAMUSCULAR; INTRAVENOUS at 09:03

## 2022-03-11 RX ADMIN — LIDOCAINE HYDROCHLORIDE 100 MG: 20 INJECTION, SOLUTION EPIDURAL; INFILTRATION; INTRACAUDAL; PERINEURAL at 09:03

## 2022-03-11 RX ADMIN — SUGAMMADEX 200 MG: 100 INJECTION, SOLUTION INTRAVENOUS at 10:03

## 2022-03-11 RX ADMIN — EPHEDRINE SULFATE 15 MG: 50 INJECTION INTRAVENOUS at 10:03

## 2022-03-11 RX ADMIN — FAMOTIDINE 20 MG: 10 INJECTION INTRAVENOUS at 08:03

## 2022-03-11 RX ADMIN — MIDAZOLAM HYDROCHLORIDE 2 MG: 1 INJECTION, SOLUTION INTRAMUSCULAR; INTRAVENOUS at 09:03

## 2022-03-11 RX ADMIN — SUCCINYLCHOLINE CHLORIDE 120 MG: 20 INJECTION, SOLUTION INTRAMUSCULAR; INTRAVENOUS at 09:03

## 2022-03-11 RX ADMIN — ROCURONIUM BROMIDE 30 MG: 10 INJECTION, SOLUTION INTRAVENOUS at 10:03

## 2022-03-11 RX ADMIN — ONDANSETRON 4 MG: 2 INJECTION INTRAMUSCULAR; INTRAVENOUS at 09:03

## 2022-03-11 NOTE — PLAN OF CARE
Pt arousing, coughing. Oral airway removed. Bright red blood coming from nose and pt coughing it up. Oral suction used to clear airway of secretions/blood. Pt tolerated well. Bleeding stabilized. Mustache dressing changed.

## 2022-03-11 NOTE — OP NOTE
[facility] Surgery  Operative Note     SUMMARY     Surgery Date: 3/11/2022       Pre-op Diagnosis:  Deviated nasal septum [J34.2]  Hypertrophy of both inferior nasal turbinates [J34.3]  Bilateral chronic serous otitis media [H65.23]    Post-op Diagnosis:  Post-Op Diagnosis Codes:     * Deviated nasal septum [J34.2]     * Hypertrophy of both inferior nasal turbinates [J34.3]     * Bilateral chronic serous otitis media [H65.23]    Procedure(s) (LRB):  REDUCTION, NASAL TURBINATE (Bilateral)  MYRINGOTOMY, WITH TYMPANOSTOMY TUBE INSERTION (Bilateral)  SEPTOPLASTY, NOSE (N/A)  FESS (FUNCTIONAL ENDOSCOPIC SINUS SURGERY) (N/A)    Surgeon(s) and Role:     * Hood Obando MD - Primary      Estimated Blood Loss: * No values recorded between 3/11/2022 10:00 AM and 3/11/2022 10:39 AM *    Anesthesia:  General    Description of the findings of the procedure:  Deviated nasal septum [J34.2]  Hypertrophy of both inferior nasal turbinates [J34.3]  Bilateral chronic serous otitis media [H65.23]           Procedure: [facility] - Surgery  Operative Note    OP Note      Date of Procedure: 3/11/2022       Pre-Operative Diagnosis:   Deviated nasal septum [J34.2]  Hypertrophy of both inferior nasal turbinates [J34.3]  Bilateral chronic serous otitis media [H65.23]    Post-Operative Diagnosis:   Post-Op Diagnosis Codes:     * Deviated nasal septum [J34.2]     * Hypertrophy of both inferior nasal turbinates [J34.3]     * Bilateral chronic serous otitis media [H65.23]    Anesthesia: General    Procedures performed:   REDUCTION, NASAL TURBINATE:   MYRINGOTOMY, WITH TYMPANOSTOMY TUBE INSERTION:   SEPTOPLASTY, NOSE:   FESS (FUNCTIONAL ENDOSCOPIC SINUS SURGERY):     Surgeon: Hood Obando MD    Procedure In Detail:   The patient was taken to the operating room and placed in the supine position. The patient was breathed down using inhalation agents via mask. When the patient was satisfactorily sedated the operating microscope was taken and  the right external auditory canal was viewed. Cerumen was removed with a cerumen loop. The external canal was filled with alcohol and suctioned. The tympanic membrane was viewed. A myringotomy was placed into the anterior-inferior quadrant. Mucopurulent material was suctioned from the middle ear cleft. A tympanostomy tube was then placed into the myringotomy followed by eardrops and a cotton ball.    Attention was then turned to the left ear. The operating microscope was taken and the left external auditory canal was viewed. The left external auditory canal was cleaned of cerumen. The external canal was filled with alcohol and suctioned. The tympanic membrane was viewed microscopically. A myringotomy was placed into the anterior-inferior quadrant, and a moderate amount of mucopurulent material was suctioned from the middle ear cleft. A tympanostomy tube was placed into the myringotomy followed by eardrops and a cotton ball. The patient was awakened and taken to the recovery room in satisfactory condition.

## 2022-03-11 NOTE — BRIEF OP NOTE
[facility] - Surgery  Brief Operative Note     SUMMARY     Surgery Date: 3/11/2022     Surgeon(s) and Role:     * Hood Obando MD - Primary        Pre-op Diagnosis:  Deviated nasal septum [J34.2]  Hypertrophy of both inferior nasal turbinates [J34.3]  Bilateral chronic serous otitis media [H65.23]    Post-op Diagnosis:  Post-Op Diagnosis Codes:     * Deviated nasal septum [J34.2]     * Hypertrophy of both inferior nasal turbinates [J34.3]     * Bilateral chronic serous otitis media [H65.23]    Procedure(s) (LRB):  REDUCTION, NASAL TURBINATE (Bilateral)  MYRINGOTOMY, WITH TYMPANOSTOMY TUBE INSERTION (Bilateral)  SEPTOPLASTY, NOSE (N/A)  FESS (FUNCTIONAL ENDOSCOPIC SINUS SURGERY) (N/A)      Description of the findings of the procedure:  Deviated nasal septum [J34.2]  Hypertrophy of both inferior nasal turbinates [J34.3]  Bilateral chronic serous otitis media [H65.23]      Estimated Blood Loss: * No values recorded between 3/11/2022 10:00 AM and 3/11/2022 10:39 AM *

## 2022-03-11 NOTE — ANESTHESIA PREPROCEDURE EVALUATION
03/11/2022  Darien Le is a 62 y.o., male.      Pre-op Assessment    I have reviewed the Patient Summary Reports.     I have reviewed the Nursing Notes. I have reviewed the NPO Status.   I have reviewed the Medications.     Review of Systems  Social:  Smoker    Cardiovascular:   Hypertension    Pulmonary:   COPD, moderate    Renal/:  Renal/ Normal     Hepatic/GI:  Hepatic/GI Normal    Musculoskeletal:  Musculoskeletal Normal    Neurological:  Neurology Normal    Endocrine:  Endocrine Normal    Dermatological:  Skin Normal    Psych:  Psychiatric Normal           Physical Exam    Airway:  Mallampati: II   Mouth Opening: Normal  TM Distance: Normal  Tongue: Normal  Neck ROM: Normal ROM    Dental:  Edentulous    Chest/Lungs:prolonged exp phase  Heart:  Rate: Normal  Rhythm: Regular Rhythm        Anesthesia Plan  Type of Anesthesia, risks & benefits discussed:    Anesthesia Type: Gen ETT  Intra-op Monitoring Plan: Standard ASA Monitors  Post Op Pain Control Plan: multimodal analgesia  Induction:  IV  Airway Plan: Direct, Post-Induction  Informed Consent: Informed consent signed with the Patient and all parties understand the risks and agree with anesthesia plan.  All questions answered.   ASA Score: 4  Day of Surgery Review of History & Physical: H&P Update referred to the surgeon/provider.    Ready For Surgery From Anesthesia Perspective.     .

## 2022-03-11 NOTE — ANESTHESIA PROCEDURE NOTES
Intubation    Date/Time: 3/11/2022 9:55 AM  Performed by: Barbara Carranza CRNA  Authorized by: Frank Fuentes MD     Intubation:     Induction:  Intravenous    Intubated:  Postinduction    Mask Ventilation:  Easy mask    Attempts:  1    Attempted By:  CRNA    Method of Intubation:  Direct    Blade:  Duran 2    Laryngeal View Grade: Grade I - full view of cords      Difficult Airway Encountered?: No      Complications:  None    Airway Device:  Oral endotracheal tube    Airway Device Size:  7.5    Style/Cuff Inflation:  Cuffed (inflated to minimal occlusive pressure)    Tube secured:  22    Secured at:  The lips    Placement Verified By:  Capnometry    Complicating Factors:  None    Findings Post-Intubation:  BS equal bilateral and atraumatic/condition of teeth unchanged

## 2022-03-11 NOTE — TRANSFER OF CARE
"Anesthesia Transfer of Care Note    Patient: Darien Le    Procedure(s) Performed: Procedure(s) (LRB):  REDUCTION, NASAL TURBINATE (Bilateral)  MYRINGOTOMY, WITH TYMPANOSTOMY TUBE INSERTION (Bilateral)  SEPTOPLASTY, NOSE (N/A)  FESS (FUNCTIONAL ENDOSCOPIC SINUS SURGERY) (N/A)    Patient location: PACU    Anesthesia Type: general    Transport from OR: Transported from OR on room air with adequate spontaneous ventilation    Post pain: adequate analgesia    Post assessment: no apparent anesthetic complications and tolerated procedure well    Post vital signs: stable    Level of consciousness: sedated and responds to stimulation    Nausea/Vomiting: no nausea/vomiting    Complications: none    Transfer of care protocol was followed      Last vitals:   Visit Vitals  /70   Pulse 64   Temp (P) 36.7 °C (98.1 °F)   Resp 14   Ht 5' 7" (1.702 m)   Wt 80.3 kg (177 lb)   SpO2 (!) 94%   BMI 27.72 kg/m²     "

## 2022-03-11 NOTE — OP NOTE
[facility] Surgery  Operative Note     SUMMARY     Surgery Date: 3/11/2022       Pre-op Diagnosis:  Deviated nasal septum [J34.2]  Hypertrophy of both inferior nasal turbinates [J34.3]  Bilateral chronic serous otitis media [H65.23]    Post-op Diagnosis:  Post-Op Diagnosis Codes:     * Deviated nasal septum [J34.2]     * Hypertrophy of both inferior nasal turbinates [J34.3]     * Bilateral chronic serous otitis media [H65.23]    Procedure(s) (LRB):  REDUCTION, NASAL TURBINATE (Bilateral)  MYRINGOTOMY, WITH TYMPANOSTOMY TUBE INSERTION (Bilateral)  SEPTOPLASTY, NOSE (N/A)  FESS (FUNCTIONAL ENDOSCOPIC SINUS SURGERY) (N/A)    Surgeon(s) and Role:     * Hood Obando MD - Primary      Estimated Blood Loss: * No values recorded between 3/11/2022 10:00 AM and 3/11/2022 10:39 AM *    Anesthesia:  General    Description of the findings of the procedure:  Deviated nasal septum [J34.2]  Hypertrophy of both inferior nasal turbinates [J34.3]  Bilateral chronic serous otitis media [H65.23]           Procedure: [place] - Surgery  Operative Note    OP Note      Date of Procedure: 3/11/2022       Pre-Operative Diagnosis:   Deviated nasal septum [J34.2]  Hypertrophy of both inferior nasal turbinates [J34.3]  Bilateral chronic serous otitis media [H65.23]    Post-Operative Diagnosis:   Post-Op Diagnosis Codes:     * Deviated nasal septum [J34.2]     * Hypertrophy of both inferior nasal turbinates [J34.3]     * Bilateral chronic serous otitis media [H65.23]    Anesthesia: General    Procedures performed:   REDUCTION, NASAL TURBINATE:   MYRINGOTOMY, WITH TYMPANOSTOMY TUBE INSERTION:   SEPTOPLASTY, NOSE:   FESS (FUNCTIONAL ENDOSCOPIC SINUS SURGERY):     Surgeon: Hood Obando MD    Procedure In Detail:   The patient was taken to the operating room and placed in the supine position. An IV was placed into the patient's arm. After satisfactory general anesthetic had been obtained, the procedure was begun. Four pledgets with 4 mL of  oxymetazoline were placed into the patient's nose. The patient's nose was injected with lidocaine 1% with epinephrine 1:100,000; approximately 15 mL were used. The patient was prepped and draped in standard fashion for a nasal operation.    The pledgets were removed.  Under endoscopic vision, the [place] was walked down the lateral nasal wall of the right nostril until it fell onto the anterior ethmoid cells. The anterior ethmoid cells were exenterated back to the lamina ethmoidalis. The lamina ethmoidalis was breached and the posterior ethmoid cells exenterated back to the rostrum of the sphenoid. Under endoscopic vision, the sphenoid ostium was viewed and palpated and opened to approximately 5 mm in diameter. The 30 degree scope was taken and the maxillary sinus viewed and palpated and the ostium opened to approximately 1 cm in diameter. With the 70 degree scope, the nasofrontal duct area was viewed and the anterior ethmoid cells taken down until the nasofrontal duct was seen to empty widely into the nose. The right nostril was then repacked with 2 oxymetazoline pledgets and attention turned to the left side.    The Chaya was walked down the lateral nasal wall until it fell into the anterior ethmoid cells. The anterior ethmoid cells were exenterated back to the lamina ethmoidalis. The lamina ethmoidalis was breached and the posterior ethmoid cells exenterated back to the rostrum of the sphenoid. The 0 degree scope was taken and the sphenoid ostium viewed and palpated. This was opened to approximately 5 mm in diameter. The 30 degree scope was taken and the maxillary sinus ostium viewed and palpated and opened to approximately 1 cm in diameter. The 70 degree scope was taken and the nasofrontal duct area viewed and the anterior ethmoid cells in this region were taken down until the nasofrontal duct was seen to empty widely into the nose. The left nostril was then repacked with oxymetazoline pledgets. The pledgets  were then removed and the nose suctioned free of any recurrent bleeding.    Attention was turned to the right inferior turbinate. An incision was made along the inferior aspect of the right inferior turbinate from posterior to anterior. A medial flap was elevated with the Chaya. The turbinate bone and lateral turbinate mucosa were excised over the anterior one third of the turbinate and the medial flap laid back over the operative bed. Attention was then turned to the left inferior turbinate. An incision was made from posterior to anterior along the inferior surface of the inferior turbinate. A medial flap was elevated. The turbinate bone and the lateral turbinate mucosa were removed over the anterior one third of the inferior turbinate and the medial flap laid back over the operative bed. The nose was packed with 2 Brand nasal packs coated with bacitracin and sinus packs coated with bacitracin as needed. The patient was awakened and taken to the recovery room in stable condition.

## 2022-03-11 NOTE — DISCHARGE INSTRUCTIONS
EAR DROPS-OFLOXACIN    Read the instructions carefully before you use ear drops. Wash your hands before and after you use the drops.  Warm the drops by holding the bottle in your hands for a few minutes. Ear drops may make your child dizzy or cause nausea if they are too cold.  Store ear drops at room temperature.  Gently shake the bottle.  Have your child lie down or tilt his or her head to one side.  If your child is younger than 3 years: Gently pull and hold your child's ear down and back.  If your child is older than 3 years: Gently pull and hold your child's ear up and back.  Insert 2-3 drops in each ear 2-3 times a day until all the ear drops are gone.

## 2022-03-11 NOTE — DISCHARGE SUMMARY
[facility] - Surgery    Discharge Note        SUMMARY     Admit Date: 3/11/2022    Attending Physician: Hood Obando MD     Discharge Physician: Hood Obando MD    Discharge Date: 3/11/2022 10:53 AM      Hospital Course: Patient tolerated procedure well.     Disposition: Home or Self Care    Patient Instructions:   Current Discharge Medication List      CONTINUE these medications which have NOT CHANGED    Details   albuterol 90 mcg/actuation inhaler Inhale 2 puffs into the lungs.      amLODIPine (NORVASC) 10 MG tablet Take 10 mg by mouth.      atenolol (TENORMIN) 100 MG tablet Take 100 mg by mouth.      busPIRone (BUSPAR) 5 MG Tab Take 5 mg by mouth 2 (two) times daily.      cloNIDine (CATAPRES) 0.2 MG tablet Take 0.1 mg by mouth.      diclofenac sodium (VOLTAREN) 1 % Gel Apply topically.      fluticasone (FLONASE) 50 mcg/actuation nasal spray 1 spray by Nasal route.      fluticasone-umeclidin-vilanter (TRELEGY ELLIPTA) 100-62.5-25 mcg DsDv INHALE 1 PUFF INTO LUNGS ONCE DAILY AT THE SAME TIME EVERY DAY      hydrALAZINE (APRESOLINE) 25 MG tablet Take 25 mg by mouth.      lisinopril (PRINIVIL,ZESTRIL) 20 MG tablet Take 20 mg by mouth.      loratadine (CLARITIN) 10 mg tablet TAKE 1 TABLET BY MOUTH ONCE DAILY AS NEEDED      montelukast (SINGULAIR) 10 mg tablet Take 10 mg by mouth.      pantoprazole (PROTONIX) 40 MG tablet Take 1 tablet by mouth once daily  Qty: 90 tablet, Refills: 0      sucralfate (CARAFATE) 1 gram tablet Take 1 tablet (1 g total) by mouth 4 (four) times daily.  Qty: 120 tablet, Refills: 0      tiotropium (SPIRIVA) 18 mcg inhalation capsule Inhale 18 mcg into the lungs.      albuterol sulfate 2.5 mg/0.5 mL Nebu Inhale 1 each into the lungs.      aspirin (ECOTRIN) 81 MG EC tablet Take 81 mg by mouth.      benzonatate (TESSALON) 100 MG capsule Take 100 mg by mouth.      budesonide-formoterol 160-4.5 mcg (SYMBICORT) 160-4.5 mcg/actuation HFAA Inhale 2 puffs into the lungs.      predniSONE  (DELTASONE) 20 MG tablet Take 20 mg by mouth.             Discharge Procedure Orders (must include Diet, Follow-up, Activity):  No discharge procedures on file.     Follow Up:  Follow up as scheduled.  Resume routine diet.  Activity as tolerated.

## 2022-03-12 NOTE — ANESTHESIA POSTPROCEDURE EVALUATION
Anesthesia Post Evaluation    Patient: Darien Le    Procedure(s) Performed: Procedure(s) (LRB):  REDUCTION, NASAL TURBINATE (Bilateral)  MYRINGOTOMY, WITH TYMPANOSTOMY TUBE INSERTION (Bilateral)  SEPTOPLASTY, NOSE (N/A)  FESS (FUNCTIONAL ENDOSCOPIC SINUS SURGERY) (N/A)    Final Anesthesia Type: general      Patient location during evaluation: PACU  Patient participation: Yes- Able to Participate  Level of consciousness: awake and alert  Post-procedure vital signs: reviewed and stable  Pain management: adequate  Airway patency: patent    PONV status at discharge: No PONV  Anesthetic complications: no      Cardiovascular status: blood pressure returned to baseline  Respiratory status: unassisted  Hydration status: euvolemic  Follow-up not needed.          Vitals Value Taken Time   /66 03/11/22 1140   Temp 36.7 °C (98.1 °F) 03/11/22 1042   Pulse 69 03/11/22 1143   Resp 7 03/11/22 1143   SpO2 93 % 03/11/22 1143   Vitals shown include unvalidated device data.      Event Time   Out of Recovery 11:16:00         Pain/Mary Anne Score: Mary Anne Score: 10 (3/11/2022 11:10 AM)  Modified Mary Anne Score: 17 (3/11/2022 11:40 AM)

## 2022-03-14 VITALS
HEIGHT: 67 IN | OXYGEN SATURATION: 94 % | BODY MASS INDEX: 27.78 KG/M2 | WEIGHT: 177 LBS | TEMPERATURE: 98 F | RESPIRATION RATE: 13 BRPM | SYSTOLIC BLOOD PRESSURE: 107 MMHG | DIASTOLIC BLOOD PRESSURE: 66 MMHG | HEART RATE: 71 BPM

## 2022-03-18 LAB — BACTERIA SPEC ANAEROBE CULT: NORMAL

## 2022-04-12 LAB — FUNGUS SPEC CULT: NORMAL

## 2022-05-04 ENCOUNTER — HOSPITAL ENCOUNTER (OUTPATIENT)
Dept: RADIOLOGY | Facility: HOSPITAL | Age: 62
Discharge: HOME | End: 2022-05-04
Attending: NURSE PRACTITIONER
Payer: MEDICAID

## 2022-05-04 DIAGNOSIS — I65.21 OCCLUSION AND STENOSIS OF RIGHT CAROTID ARTERY: ICD-10-CM

## 2022-05-04 PROCEDURE — 93880 EXTRACRANIAL BILAT STUDY: CPT | Mod: 26,,, | Performed by: RADIOLOGY

## 2022-05-04 PROCEDURE — 93880 US CAROTID BILATERAL: ICD-10-PCS | Mod: 26,,, | Performed by: RADIOLOGY

## 2022-05-04 PROCEDURE — 93880 EXTRACRANIAL BILAT STUDY: CPT | Mod: TC

## 2022-06-16 ENCOUNTER — PATIENT MESSAGE (OUTPATIENT)
Dept: SURGERY | Facility: CLINIC | Age: 62
End: 2022-06-16
Payer: MEDICAID

## 2023-01-12 ENCOUNTER — HOSPITAL ENCOUNTER (OUTPATIENT)
Dept: RADIOLOGY | Facility: HOSPITAL | Age: 63
Discharge: HOME | End: 2023-01-12
Attending: NURSE PRACTITIONER
Payer: MEDICARE

## 2023-01-12 DIAGNOSIS — J44.9 COPD (CHRONIC OBSTRUCTIVE PULMONARY DISEASE): ICD-10-CM

## 2023-01-12 PROCEDURE — 71271 CT THORAX LUNG CANCER SCR C-: CPT | Mod: 26,,, | Performed by: RADIOLOGY

## 2023-01-12 PROCEDURE — 71271 CT THORAX LUNG CANCER SCR C-: CPT | Mod: TC

## 2023-01-12 PROCEDURE — 71271 CT CHEST LUNG SCREENING LOW DOSE: ICD-10-PCS | Mod: 26,,, | Performed by: RADIOLOGY

## 2023-02-08 ENCOUNTER — TELEPHONE (OUTPATIENT)
Dept: SURGERY | Facility: CLINIC | Age: 63
End: 2023-02-08
Payer: MEDICARE

## 2023-02-08 NOTE — TELEPHONE ENCOUNTER
Writer spoke to patient and rescheduled his consult to 03/07/23 @ 08:00 am. Pt no showed his last scheduled appt in 07/22 due to being sick. Pt expressed verbal understanding.

## 2023-02-08 NOTE — TELEPHONE ENCOUNTER
----- Message from Abimbola Benitez sent at 2/8/2023  9:38 AM CST -----  Regarding: pt call back  Who Called:ARLYN OLIVAREZ [[id number]]         What is the reqeust in detail: Pt was suppose to receive paper work for upper GI and colonoscopy . Please advise         Can the clinic reply by [facility]?NO         Best Call Back Number:[phone]           Additional Information:

## 2023-03-07 ENCOUNTER — OFFICE VISIT (OUTPATIENT)
Dept: SURGERY | Facility: CLINIC | Age: 63
End: 2023-03-07
Payer: MEDICARE

## 2023-03-07 VITALS
HEIGHT: 67 IN | HEART RATE: 61 BPM | WEIGHT: 177 LBS | DIASTOLIC BLOOD PRESSURE: 76 MMHG | OXYGEN SATURATION: 95 % | BODY MASS INDEX: 27.78 KG/M2 | SYSTOLIC BLOOD PRESSURE: 147 MMHG

## 2023-03-07 DIAGNOSIS — R13.10 DYSPHAGIA, UNSPECIFIED TYPE: Primary | ICD-10-CM

## 2023-03-07 DIAGNOSIS — K22.70 BARRETT'S ESOPHAGUS WITHOUT DYSPLASIA: ICD-10-CM

## 2023-03-07 DIAGNOSIS — Z86.010 HISTORY OF COLON POLYPS: ICD-10-CM

## 2023-03-07 PROCEDURE — 1101F PT FALLS ASSESS-DOCD LE1/YR: CPT | Mod: CPTII,S$GLB,, | Performed by: SURGERY

## 2023-03-07 PROCEDURE — 99214 OFFICE O/P EST MOD 30 MIN: CPT | Mod: S$GLB,,, | Performed by: SURGERY

## 2023-03-07 PROCEDURE — 3078F PR MOST RECENT DIASTOLIC BLOOD PRESSURE < 80 MM HG: ICD-10-PCS | Mod: CPTII,S$GLB,, | Performed by: SURGERY

## 2023-03-07 PROCEDURE — 3288F PR FALLS RISK ASSESSMENT DOCUMENTED: ICD-10-PCS | Mod: CPTII,S$GLB,, | Performed by: SURGERY

## 2023-03-07 PROCEDURE — 3008F BODY MASS INDEX DOCD: CPT | Mod: CPTII,S$GLB,, | Performed by: SURGERY

## 2023-03-07 PROCEDURE — 3008F PR BODY MASS INDEX (BMI) DOCUMENTED: ICD-10-PCS | Mod: CPTII,S$GLB,, | Performed by: SURGERY

## 2023-03-07 PROCEDURE — 3288F FALL RISK ASSESSMENT DOCD: CPT | Mod: CPTII,S$GLB,, | Performed by: SURGERY

## 2023-03-07 PROCEDURE — 3077F SYST BP >= 140 MM HG: CPT | Mod: CPTII,S$GLB,, | Performed by: SURGERY

## 2023-03-07 PROCEDURE — 1159F PR MEDICATION LIST DOCUMENTED IN MEDICAL RECORD: ICD-10-PCS | Mod: CPTII,S$GLB,, | Performed by: SURGERY

## 2023-03-07 PROCEDURE — 99999 PR PBB SHADOW E&M-EST. PATIENT-LVL V: ICD-10-PCS | Mod: PBBFAC,,, | Performed by: SURGERY

## 2023-03-07 PROCEDURE — 1159F MED LIST DOCD IN RCRD: CPT | Mod: CPTII,S$GLB,, | Performed by: SURGERY

## 2023-03-07 PROCEDURE — 4010F PR ACE/ARB THEARPY RXD/TAKEN: ICD-10-PCS | Mod: CPTII,S$GLB,, | Performed by: SURGERY

## 2023-03-07 PROCEDURE — 3078F DIAST BP <80 MM HG: CPT | Mod: CPTII,S$GLB,, | Performed by: SURGERY

## 2023-03-07 PROCEDURE — 1101F PR PT FALLS ASSESS DOC 0-1 FALLS W/OUT INJ PAST YR: ICD-10-PCS | Mod: CPTII,S$GLB,, | Performed by: SURGERY

## 2023-03-07 PROCEDURE — 99999 PR PBB SHADOW E&M-EST. PATIENT-LVL V: CPT | Mod: PBBFAC,,, | Performed by: SURGERY

## 2023-03-07 PROCEDURE — 4010F ACE/ARB THERAPY RXD/TAKEN: CPT | Mod: CPTII,S$GLB,, | Performed by: SURGERY

## 2023-03-07 PROCEDURE — 3077F PR MOST RECENT SYSTOLIC BLOOD PRESSURE >= 140 MM HG: ICD-10-PCS | Mod: CPTII,S$GLB,, | Performed by: SURGERY

## 2023-03-07 PROCEDURE — 99214 PR OFFICE/OUTPT VISIT, EST, LEVL IV, 30-39 MIN: ICD-10-PCS | Mod: S$GLB,,, | Performed by: SURGERY

## 2023-03-07 RX ORDER — PANTOPRAZOLE SODIUM 40 MG/1
40 TABLET, DELAYED RELEASE ORAL DAILY
Qty: 90 TABLET | Refills: 0 | Status: SHIPPED | OUTPATIENT
Start: 2023-03-07

## 2023-03-07 RX ORDER — SODIUM CHLORIDE 9 MG/ML
INJECTION, SOLUTION INTRAVENOUS CONTINUOUS
Status: CANCELLED | OUTPATIENT
Start: 2023-03-07

## 2023-03-07 NOTE — H&P
[facility] Surgery H&P Note    Subjective:       Patient ID: Darien Le is a 63 y.o. male.    Chief Complaint:  Doc I need scopes.  HPI:  Darien Le is a 63 y.o. male history of COPD hypertension presents today as a established patient for follow-up evaluation and new issues.  Patient with dysphagia.  Upper esophageal.  History of Portillo's esophagus.  History of numerous colon polyps.  Due for repeat EGD and colonoscopy.  Dysphagia upper esophageal near cricopharyngeus.  Solids greater than liquids.  Needs further evaluation possible dilatation.  No blood in stool.  No unexplained weight loss bowel habit changes, etcetera.  No known family history of colon or rectal cancers.  Patient presents today for evaluation.    Past Medical History:   Diagnosis Date    Allergy     Arthritis     COPD (chronic obstructive pulmonary disease)     Digestive disorder     Hypertension      Past Surgical History:   Procedure Laterality Date    COLONOSCOPY N/A 8/27/2018    Procedure: COLONOSCOPY;  Surgeon: Reji Dsouza MD;  Location: [facility];  Service: Endoscopy;  Laterality: N/A;  WITH POLYPECTOMY X 2    ESOPHAGOGASTRODUODENOSCOPY N/A 8/27/2018    Procedure: ESOPHAGOGASTRODUODENOSCOPY (EGD);  Surgeon: Reji Dsouza MD;  Location: [facility];  Service: Endoscopy;  Laterality: N/A;  WITH BX    EXCISION OF SOFT TISSUE N/A 10/22/2021    Procedure: EXCISION, SOFT TISSUE;  Surgeon: Reji Dsouza MD;  Location: [facility] OR;  Service: General;  Laterality: N/A;    FUNCTIONAL ENDOSCOPIC SINUS SURGERY (FESS) N/A 3/11/2022    Procedure: FESS (FUNCTIONAL ENDOSCOPIC SINUS SURGERY);  Surgeon: Hood Obando MD;  Location: [facility] OR;  Service: ENT;  Laterality: N/A;    MYRINGOTOMY WITH INSERTION OF VENTILATION TUBE Bilateral 3/11/2022    Procedure: MYRINGOTOMY, WITH TYMPANOSTOMY TUBE INSERTION;  Surgeon: Hood Obando MD;  Location: [facility] OR;  Service: ENT;  Laterality: Bilateral;    NASAL SEPTOPLASTY  N/A 3/11/2022    Procedure: SEPTOPLASTY, NOSE;  Surgeon: Hood Obando MD;  Location: [facility] OR;  Service: ENT;  Laterality: N/A;    NASAL TURBINATE REDUCTION Bilateral 3/11/2022    Procedure: REDUCTION, NASAL TURBINATE;  Surgeon: Hood Obando MD;  Location: [facility] OR;  Service: ENT;  Laterality: Bilateral;    NECK SURGERY       Family History   Problem Relation Age of Onset    Heart disease Mother     Cancer Father      Social History     Socioeconomic History    Marital status:    Tobacco Use    Smoking status: Every Day     Packs/day: 2.00     Years: 50.00     Pack years: 100.00     Types: Cigarettes    Smokeless tobacco: Never   Substance and Sexual Activity    Alcohol use: Yes    Drug use: No    Sexual activity: Yes       Current Outpatient Medications   Medication Sig Dispense Refill    albuterol 90 mcg/actuation inhaler Inhale 2 puffs into the lungs.      albuterol sulfate 2.5 mg/0.5 mL Nebu Inhale 1 each into the lungs.      amLODIPine (NORVASC) 10 MG tablet Take 10 mg by mouth.      aspirin (ECOTRIN) 81 MG EC tablet Take 81 mg by mouth.      atenolol (TENORMIN) 100 MG tablet Take 100 mg by mouth.      benzonatate (TESSALON) 100 MG capsule Take 100 mg by mouth.      budesonide-formoterol 160-4.5 mcg (SYMBICORT) 160-4.5 mcg/actuation HFAA Inhale 2 puffs into the lungs.      busPIRone (BUSPAR) 5 MG Tab Take 5 mg by mouth 2 (two) times daily.      cloNIDine (CATAPRES) 0.2 MG tablet Take 0.1 mg by mouth.      diclofenac sodium (VOLTAREN) 1 % Gel Apply topically.      fluticasone (FLONASE) 50 mcg/actuation nasal spray 1 spray by Nasal route.      fluticasone-umeclidin-vilanter (TRELEGY ELLIPTA) 100-62.5-25 mcg DsDv INHALE 1 PUFF INTO LUNGS ONCE DAILY AT THE SAME TIME EVERY DAY      hydrALAZINE (APRESOLINE) 25 MG tablet Take 25 mg by mouth.      lisinopril (PRINIVIL,ZESTRIL) 20 MG tablet Take 20 mg by mouth.      loratadine (CLARITIN) 10 mg tablet TAKE 1 TABLET BY MOUTH ONCE DAILY AS NEEDED       "montelukast (SINGULAIR) 10 mg tablet Take 10 mg by mouth.      predniSONE (DELTASONE) 20 MG tablet Take 20 mg by mouth.      tiotropium (SPIRIVA) 18 mcg inhalation capsule Inhale 18 mcg into the lungs.      pantoprazole (PROTONIX) 40 MG tablet Take 1 tablet (40 mg total) by mouth once daily. 90 tablet 0    sucralfate (CARAFATE) 1 gram tablet Take 1 tablet (1 g total) by mouth 4 (four) times daily. (Patient not taking: Reported on 3/7/2023) 120 tablet 0     No current facility-administered medications for this visit.     Review of patient's allergies indicates:   Allergen Reactions    Umeclidinium bromide Swelling    Codeine Nausea And Vomiting       Review of Systems   Constitutional:  Negative for appetite change, chills and fever.   HENT:  Positive for trouble swallowing. Negative for congestion, dental problem and drooling.    Eyes:  Negative for photophobia, discharge and itching.   Respiratory:  Negative for apnea and chest tightness.    Cardiovascular:  Negative for chest pain, palpitations and leg swelling.   Gastrointestinal:  Negative for abdominal distention and abdominal pain.   Endocrine: Negative for cold intolerance and heat intolerance.   Genitourinary:  Negative for difficulty urinating and dysuria.   Musculoskeletal:  Negative for arthralgias and back pain.   Skin:  Negative for color change and pallor.   Neurological:  Negative for dizziness, facial asymmetry and headaches.   Hematological:  Negative for adenopathy. Does not bruise/bleed easily.   Psychiatric/Behavioral:  Negative for agitation, behavioral problems and confusion.      Objective:      Vitals:    03/07/23 0804   BP: (!) 147/76   Pulse: 61   SpO2: 95%   Weight: 80.3 kg (177 lb)   Height: 5' 7" (1.702 m)     Physical Exam  Constitutional:       Appearance: He is well-developed. He is not diaphoretic.   HENT:      Head: Normocephalic and atraumatic.   Eyes:      Pupils: Pupils are equal, round, and reactive to light.   Neck:      " Thyroid: No thyromegaly.   Cardiovascular:      Rate and Rhythm: Normal rate and regular rhythm.      Heart sounds: No murmur heard.  Pulmonary:      Effort: Pulmonary effort is normal. No respiratory distress.      Breath sounds: Normal breath sounds.   Abdominal:      General: Bowel sounds are normal. There is no distension.      Palpations: Abdomen is soft.      Tenderness: There is no abdominal tenderness.   Musculoskeletal:         General: Normal range of motion.      Cervical back: Normal range of motion and neck supple.   Skin:     General: Skin is warm.      Capillary Refill: Capillary refill takes less than 2 seconds.      Findings: No erythema or rash.   Neurological:      Mental Status: He is alert and oriented to person, place, and time.      Cranial Nerves: No cranial nerve deficit.        Assessment:       1. Dysphagia, unspecified type    2. Portillo's esophagus without dysplasia    3. 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  -     Full code; Standing  -     Place in Outpatient; Standing  -     Case Request Operating Room: COLONOSCOPY, ESOPHAGOGASTRODUODENOSCOPY (EGD)  -     Vital signs; Standing  -     Insert peripheral IV; Standing  -     Diet NPO; Standing  -     Place TOR hose; Standing  -     Place sequential compression device; Standing    History of colon polyps  -     Full code; Standing  -     Place in Outpatient; Standing  -     Case Request Operating Room: COLONOSCOPY, ESOPHAGOGASTRODUODENOSCOPY (EGD)  -     Vital signs; Standing  -     Insert peripheral IV; Standing  -     Diet NPO; Standing  -      Place TOR hose; Standing  -     Place sequential compression device; Standing    Other orders  -     pantoprazole (PROTONIX) 40 MG tablet; Take 1 tablet (40 mg total) by mouth once daily.  Dispense: 90 tablet; Refill: 0  -     0.9%  NaCl infusion  -     IP VTE LOW RISK PATIENT; Standing        Medical Decision Making/Counseling:  Dysphagia.  Early esophageal.  EGD with dilatation indicated.  Colonoscopy warranted with history of colon polyps.  Additionally patient with Portillo's esophagus on previous EGD needs biopsies.  Risk benefits have been discussed.  Patient voiced understanding risk benefits wished to proceed near future.      Risk of aspiration perforation bleeding were discussed.    Patient instructed that best way to communicate with my office staff is for patient to get on the [facility] patient portal to expedite communication and communication issues that may occur.  Patient was given instructions on how to get on the portal.  I encouraged patient to obtain portal access as well.  Ultimately it is up to the patient to obtain access.  Patient voiced understanding.

## 2023-03-07 NOTE — PATIENT INSTRUCTIONS
Miralax Bowel Prep Instructions     Date of procedure:  Friday March 10th    Bowel Prep Instructions:  You will need to purchase at the store the following from the pharmacy:  One (1) box laxative tablets, (NOT STOOL SOFTNERS)  8.3 oz bottle of Miralax (or generic)   Two (2) quarts (64oz) of liquid to drink. We suggest Gatorade or Powerade.  Do not follow packaging instructions on either of these items    Day 1   Thursday March 9th   All day you will be on a Clear Liquid Diet   You may have the following chicken, beef or bone broth, Jell-O, Popsicles, Sprite, Water, Gatorade, Powerade, and Black Coffee.   Do not eat or drink anything RED OR PURPLE IN COLOR     At Noon 12:00 pm, you will take two laxative tablets.      At 2:00 pm, you will drink half of your Miralax prep Drink     At 6:00 pm, you will drink the remaining half of your prep drink and two laxative tablets.      Do not eat or drink after Midnight.      Day 2   Friday March 10th   You may take your normal medications with a small sip of water.    Do not take the following Medications for 3 days prior to your procedure:   Aspirin, Excedrin, BC powder or goodies powders   Ibuprofen or Motrin  Naproxen or Aleve      Location of Department:   [facility] [address], [place] [address]    Parking:    Use parking lot A  Enter at the main hospital entrance  Check in with outpatient registration    Contact:   Someone from surgery will call you with a time of arrival.   If you do not receive a call from surgery by 2pm the business day (Thursday March 10th) before your procedure, please call [phone].     Transportation:   You will NOT be able to drive yourself.  You are required to have someone drive you home from the hospital due to the anesthesia.

## 2023-03-07 NOTE — NURSING
Patient, Darien Le, was instructed on Miralax bowel prep. Patient was given instructions on pre-op, logan-op, and post-op scheduled appointments.  Patient received blue folder containing all written instructions.      Evie Martínez LPN

## 2023-03-10 ENCOUNTER — ANESTHESIA EVENT (OUTPATIENT)
Dept: SURGERY | Facility: HOSPITAL | Age: 63
End: 2023-03-10
Payer: MEDICARE

## 2023-03-10 ENCOUNTER — HOSPITAL ENCOUNTER (OUTPATIENT)
Facility: HOSPITAL | Age: 63
Discharge: HOME | End: 2023-03-10
Attending: SURGERY | Admitting: SURGERY
Payer: MEDICARE

## 2023-03-10 ENCOUNTER — ANESTHESIA (OUTPATIENT)
Dept: SURGERY | Facility: HOSPITAL | Age: 63
End: 2023-03-10
Payer: MEDICARE

## 2023-03-10 VITALS
RESPIRATION RATE: 18 BRPM | WEIGHT: 177 LBS | DIASTOLIC BLOOD PRESSURE: 82 MMHG | OXYGEN SATURATION: 98 % | BODY MASS INDEX: 27.78 KG/M2 | HEIGHT: 67 IN | TEMPERATURE: 98 F | HEART RATE: 71 BPM | SYSTOLIC BLOOD PRESSURE: 141 MMHG

## 2023-03-10 DIAGNOSIS — K22.70 BARRETT'S ESOPHAGUS WITHOUT DYSPLASIA: ICD-10-CM

## 2023-03-10 DIAGNOSIS — Z86.010 HISTORY OF COLON POLYPS: ICD-10-CM

## 2023-03-10 DIAGNOSIS — R13.10 DYSPHAGIA, UNSPECIFIED TYPE: ICD-10-CM

## 2023-03-10 PROCEDURE — G0105 COLORECTAL SCRN; HI RISK IND: HCPCS | Performed by: SURGERY

## 2023-03-10 PROCEDURE — 37000009 HC ANESTHESIA EA ADD 15 MINS: Performed by: SURGERY

## 2023-03-10 PROCEDURE — 63600175 PHARM REV CODE 636 W HCPCS: Performed by: NURSE ANESTHETIST, CERTIFIED REGISTERED

## 2023-03-10 PROCEDURE — 43450 PR DILATE ESOPHAGUS: ICD-10-PCS | Mod: 51,,, | Performed by: SURGERY

## 2023-03-10 PROCEDURE — 43450 DILATE ESOPHAGUS 1/MULT PASS: CPT | Performed by: SURGERY

## 2023-03-10 PROCEDURE — 43450 DILATE ESOPHAGUS 1/MULT PASS: CPT | Mod: 51,,, | Performed by: SURGERY

## 2023-03-10 PROCEDURE — 43239 EGD BIOPSY SINGLE/MULTIPLE: CPT | Mod: 59 | Performed by: SURGERY

## 2023-03-10 PROCEDURE — 43239 PR EGD, FLEX, W/BIOPSY, SGL/MULTI: ICD-10-PCS | Mod: ,,, | Performed by: SURGERY

## 2023-03-10 PROCEDURE — D9220A PRA ANESTHESIA: Mod: ANES,,, | Performed by: ANESTHESIOLOGY

## 2023-03-10 PROCEDURE — G0105 COLORECTAL SCRN; HI RISK IND: ICD-10-PCS | Mod: ,,, | Performed by: SURGERY

## 2023-03-10 PROCEDURE — D9220A PRA ANESTHESIA: Mod: CRNA,,, | Performed by: NURSE ANESTHETIST, CERTIFIED REGISTERED

## 2023-03-10 PROCEDURE — 43239 EGD BIOPSY SINGLE/MULTIPLE: CPT | Mod: ,,, | Performed by: SURGERY

## 2023-03-10 PROCEDURE — 88305 TISSUE EXAM BY PATHOLOGIST: CPT | Mod: 26,,, | Performed by: PATHOLOGY

## 2023-03-10 PROCEDURE — 88305 TISSUE EXAM BY PATHOLOGIST: ICD-10-PCS | Mod: 26,,, | Performed by: PATHOLOGY

## 2023-03-10 PROCEDURE — 88305 TISSUE EXAM BY PATHOLOGIST: CPT | Performed by: PATHOLOGY

## 2023-03-10 PROCEDURE — D9220A PRA ANESTHESIA: ICD-10-PCS | Mod: CRNA,,, | Performed by: NURSE ANESTHETIST, CERTIFIED REGISTERED

## 2023-03-10 PROCEDURE — D9220A PRA ANESTHESIA: ICD-10-PCS | Mod: ANES,,, | Performed by: ANESTHESIOLOGY

## 2023-03-10 PROCEDURE — 37000008 HC ANESTHESIA 1ST 15 MINUTES: Performed by: SURGERY

## 2023-03-10 PROCEDURE — G0105 COLORECTAL SCRN; HI RISK IND: HCPCS | Mod: ,,, | Performed by: SURGERY

## 2023-03-10 RX ORDER — PROPOFOL 10 MG/ML
VIAL (ML) INTRAVENOUS
Status: DISCONTINUED | OUTPATIENT
Start: 2023-03-10 | End: 2023-03-10

## 2023-03-10 RX ORDER — ONDANSETRON 2 MG/ML
4 INJECTION INTRAMUSCULAR; INTRAVENOUS DAILY PRN
Status: DISCONTINUED | OUTPATIENT
Start: 2023-03-10 | End: 2023-03-10 | Stop reason: HOSPADM

## 2023-03-10 RX ORDER — SODIUM CHLORIDE 9 MG/ML
INJECTION, SOLUTION INTRAVENOUS CONTINUOUS
Status: DISCONTINUED | OUTPATIENT
Start: 2023-03-10 | End: 2023-03-10 | Stop reason: HOSPADM

## 2023-03-10 RX ORDER — SODIUM CHLORIDE, SODIUM LACTATE, POTASSIUM CHLORIDE, CALCIUM CHLORIDE 600; 310; 30; 20 MG/100ML; MG/100ML; MG/100ML; MG/100ML
INJECTION, SOLUTION INTRAVENOUS CONTINUOUS
Status: DISCONTINUED | OUTPATIENT
Start: 2023-03-10 | End: 2023-03-10 | Stop reason: HOSPADM

## 2023-03-10 RX ORDER — LIDOCAINE HYDROCHLORIDE 10 MG/ML
1 INJECTION, SOLUTION EPIDURAL; INFILTRATION; INTRACAUDAL; PERINEURAL ONCE
Status: DISCONTINUED | OUTPATIENT
Start: 2023-03-10 | End: 2023-03-10 | Stop reason: HOSPADM

## 2023-03-10 RX ORDER — SODIUM CHLORIDE, SODIUM LACTATE, POTASSIUM CHLORIDE, CALCIUM CHLORIDE 600; 310; 30; 20 MG/100ML; MG/100ML; MG/100ML; MG/100ML
125 INJECTION, SOLUTION INTRAVENOUS CONTINUOUS
Status: DISCONTINUED | OUTPATIENT
Start: 2023-03-10 | End: 2023-03-10 | Stop reason: HOSPADM

## 2023-03-10 RX ADMIN — PROPOFOL 50 MG: 10 INJECTION, EMULSION INTRAVENOUS at 07:03

## 2023-03-10 RX ADMIN — PROPOFOL 150 MG: 10 INJECTION, EMULSION INTRAVENOUS at 07:03

## 2023-03-10 RX ADMIN — PROPOFOL 50 MG: 10 INJECTION, EMULSION INTRAVENOUS at 08:03

## 2023-03-10 NOTE — H&P
[facility] Surgery H&P Note    Subjective:       Patient ID: Darien Le is a 63 y.o. male.    Chief Complaint:  Doc I need scopes.  HPI:  Darien Le is a 63 y.o. male history of COPD hypertension presents today as a established patient for follow-up evaluation and new issues.  Patient with dysphagia.  Upper esophageal.  History of Portillo's esophagus.  History of numerous colon polyps.  Due for repeat EGD and colonoscopy.  Dysphagia upper esophageal near cricopharyngeus.  Solids greater than liquids.  Needs further evaluation possible dilatation.  No blood in stool.  No unexplained weight loss bowel habit changes, etcetera.  No known family history of colon or rectal cancers.  Patient presents today for evaluation.    Past Medical History:   Diagnosis Date    Allergy     Arthritis     COPD (chronic obstructive pulmonary disease)     Digestive disorder     Hypertension      Past Surgical History:   Procedure Laterality Date    COLONOSCOPY N/A 8/27/2018    Procedure: COLONOSCOPY;  Surgeon: Reji Dsouza MD;  Location: [facility];  Service: Endoscopy;  Laterality: N/A;  WITH POLYPECTOMY X 2    ESOPHAGOGASTRODUODENOSCOPY N/A 8/27/2018    Procedure: ESOPHAGOGASTRODUODENOSCOPY (EGD);  Surgeon: Reji Dsouza MD;  Location: [facility];  Service: Endoscopy;  Laterality: N/A;  WITH BX    EXCISION OF SOFT TISSUE N/A 10/22/2021    Procedure: EXCISION, SOFT TISSUE;  Surgeon: Reji Dsouza MD;  Location: [facility] OR;  Service: General;  Laterality: N/A;    FUNCTIONAL ENDOSCOPIC SINUS SURGERY (FESS) N/A 3/11/2022    Procedure: FESS (FUNCTIONAL ENDOSCOPIC SINUS SURGERY);  Surgeon: Hood Obando MD;  Location: [facility] OR;  Service: ENT;  Laterality: N/A;    MYRINGOTOMY WITH INSERTION OF VENTILATION TUBE Bilateral 3/11/2022    Procedure: MYRINGOTOMY, WITH TYMPANOSTOMY TUBE INSERTION;  Surgeon: Hood Obando MD;  Location: [facility] OR;  Service: ENT;  Laterality: Bilateral;    NASAL SEPTOPLASTY  N/A 3/11/2022    Procedure: SEPTOPLASTY, NOSE;  Surgeon: Hood Obando MD;  Location: [facility] OR;  Service: ENT;  Laterality: N/A;    NASAL TURBINATE REDUCTION Bilateral 3/11/2022    Procedure: REDUCTION, NASAL TURBINATE;  Surgeon: Hood Obando MD;  Location: [facility] OR;  Service: ENT;  Laterality: Bilateral;    NECK SURGERY       Family History   Problem Relation Age of Onset    Heart disease Mother     Cancer Father      Social History     Socioeconomic History    Marital status:    Tobacco Use    Smoking status: Every Day     Packs/day: 2.00     Years: 50.00     Pack years: 100.00     Types: Cigarettes    Smokeless tobacco: Never   Substance and Sexual Activity    Alcohol use: Yes    Drug use: No    Sexual activity: Yes       Current Facility-Administered Medications   Medication Dose Route Frequency Provider Last Rate Last Admin    0.9%  NaCl infusion   Intravenous Continuous Reji Dsouza MD         Review of patient's allergies indicates:   Allergen Reactions    Umeclidinium bromide Swelling    Codeine Nausea And Vomiting       Review of Systems   Constitutional:  Negative for appetite change, chills and fever.   HENT:  Positive for trouble swallowing. Negative for congestion, dental problem and drooling.    Eyes:  Negative for photophobia, discharge and itching.   Respiratory:  Negative for apnea and chest tightness.    Cardiovascular:  Negative for chest pain, palpitations and leg swelling.   Gastrointestinal:  Negative for abdominal distention and abdominal pain.   Endocrine: Negative for cold intolerance and heat intolerance.   Genitourinary:  Negative for difficulty urinating and dysuria.   Musculoskeletal:  Negative for arthralgias and back pain.   Skin:  Negative for color change and pallor.   Neurological:  Negative for dizziness, facial asymmetry and headaches.   Hematological:  Negative for adenopathy. Does not bruise/bleed easily.   Psychiatric/Behavioral:  Negative for  "agitation, behavioral problems and confusion.      Objective:      Vitals:    03/10/23 0703   BP: 132/77   BP Location: Right arm   Patient Position: Sitting   Pulse: 73   Resp: 20   Temp: 98.4 °F (36.9 °C)   TempSrc: Oral   SpO2: 97%   Weight: 80.3 kg (177 lb)   Height: 5' 7" (1.702 m)     Physical Exam  Constitutional:       Appearance: He is well-developed. He is not diaphoretic.   HENT:      Head: Normocephalic and atraumatic.   Eyes:      Pupils: Pupils are equal, round, and reactive to light.   Neck:      Thyroid: No thyromegaly.   Cardiovascular:      Rate and Rhythm: Normal rate and regular rhythm.      Heart sounds: No murmur heard.  Pulmonary:      Effort: Pulmonary effort is normal. No respiratory distress.      Breath sounds: Normal breath sounds.   Abdominal:      General: Bowel sounds are normal. There is no distension.      Palpations: Abdomen is soft.      Tenderness: There is no abdominal tenderness.   Musculoskeletal:         General: Normal range of motion.      Cervical back: Normal range of motion and neck supple.   Skin:     General: Skin is warm.      Capillary Refill: Capillary refill takes less than 2 seconds.      Findings: No erythema or rash.   Neurological:      Mental Status: He is alert and oriented to person, place, and time.      Cranial Nerves: No cranial nerve deficit.        Assessment:       1. Portillo's esophagus without dysplasia    2. History of colon polyps    3. Dysphagia, unspecified type          Plan:   Portillo's esophagus without dysplasia  -     Case Request Operating Room: COLONOSCOPY, ESOPHAGOGASTRODUODENOSCOPY (EGD)  -     Place in Outpatient; Standing  -     Vital signs; Standing  -     Diet NPO; Standing  -     Place TOR hose; Standing  -     Place sequential compression device; Standing    History of colon polyps  -     Case Request Operating Room: COLONOSCOPY, ESOPHAGOGASTRODUODENOSCOPY (EGD)  -     Place in Outpatient; Standing  -     Vital signs; Standing  -    "  Diet NPO; Standing  -     Place TOR hose; Standing  -     Place sequential compression device; Standing    Dysphagia, unspecified type  -     Case Request Operating Room: COLONOSCOPY, ESOPHAGOGASTRODUODENOSCOPY (EGD)  -     Place in Outpatient; Standing  -     Vital signs; Standing  -     Diet NPO; Standing  -     Place TOR hose; Standing  -     Place sequential compression device; Standing    Other orders  -     0.9%  NaCl infusion  -     IP VTE LOW RISK PATIENT; Standing  -     Vital signs; Standing  -     Insert peripheral IV; Standing  -     Use 1% lidocaine at IV site; Standing  -     Nursing to confirm consent for anesthesia services; Standing  -     Diet NPO Except for: Medication; Standing  -     lactated ringers infusion  -     Notify Anesthesiologist if Patient on Home Insulin Pump; Standing  -     LIDOcaine (PF) 10 mg/ml (1%) injection 10 mg  -     POCT glucose; Standing  -     Pregnancy, urine rapid; Standing  -     Admit to Phase 1 PACU, transfer to Phase 2 per protocol when indicated ; Standing  -     Vital signs; Standing  -     ondansetron injection 4 mg  -     Intake and output Per protocol; Standing  -     Apply warming blanket; Standing  -     lactated ringers infusion  -     POCT glucose; Standing      Medical Decision Making/Counseling:  Dysphagia.  Early esophageal.  EGD with dilatation indicated.  Colonoscopy warranted with history of colon polyps.  Additionally patient with Portillo's esophagus on previous EGD needs biopsies.  Risk benefits have been discussed.  Patient voiced understanding risk benefits wished to proceed near future.      Risk of aspiration perforation bleeding were discussed.    Patient instructed that best way to communicate with my office staff is for patient to get on the [facility] epic patient portal to expedite communication and communication issues that may occur.  Patient was given instructions on how to get on the portal.  I encouraged patient to obtain portal access  as well.  Ultimately it is up to the patient to obtain access.  Patient voiced understanding.

## 2023-03-10 NOTE — [employer] PATIENT INSTRUCTIONS
Discharge Summary/Instructions after an Endoscopic Procedure  Patient Name: Darien Le  Patient MRN: [id number]  Patient YOB: 1960  Friday, March 10, 2023  Reji Dsouza MD  RESTRICTIONS:  During your procedure today, you received medications for sedation.  These   medications may affect your judgment, balance and coordination.  Therefore,   for 24 hours, you have the following restrictions:   - DO NOT drive a car, operate machinery, make legal/financial decisions,   sign important papers or drink alcohol.    ACTIVITY:  Today: no heavy lifting, straining or running due to procedural   sedation/anesthesia.  The following day: return to full activity including work.  DIET:  Eat and drink normally unless instructed otherwise.     TREATMENT FOR COMMON SIDE EFFECTS:  - Mild abdominal pain, nausea, belching, bloating or excessive gas:  rest,   eat lightly and use a heating pad.  - Sore Throat: treat with throat lozenges and/or gargle with warm salt   water.  - Because air was used during the procedure, expelling large amounts of air   from your rectum or belching is normal.  - If a bowel prep was taken, you may not have a bowel movement for 1-3 days.    This is normal.  SYMPTOMS TO WATCH FOR AND REPORT TO YOUR PHYSICIAN:  1. Abdominal pain or bloating, other than gas cramps.  2. Chest pain.  3. Back pain.  4. Signs of infection such as: chills or fever occurring within 24 hours   after the procedure.  5. Rectal bleeding, which would show as bright red, maroon, or black stools.   (A tablespoon of blood from the rectum is not serious, especially if   hemorrhoids are present.)  6. Vomiting.  7. Weakness or dizziness.  GO DIRECTLY TO THE NEAREST EMERGENCY ROOM IF YOU HAVE ANY OF THE FOLLOWING:      Difficulty breathing              Chills and/or fever over 101 F   Persistent vomiting and/or vomiting blood   Severe abdominal pain   Severe chest pain   Black, tarry stools   Bleeding- more than one  tablespoon   Any other symptom or condition that you feel may need urgent attention  Your doctor recommends these additional instructions:  If any biopsies were taken, your doctors clinic will contact you in 1 to 2   weeks with any results.  - Discharge patient to home (ambulatory).   - Patient has a contact number available for emergencies.  The signs and   symptoms of potential delayed complications were discussed with the   patient.  Return to normal activities tomorrow.  Written discharge   instructions were provided to the patient.   - Resume previous diet.   - Continue present medications.   - Return to primary care physician as previously scheduled.   - Repeat colonoscopy in 2 years for surveillance.  For questions, problems or results please call your physician - Reji Dsouza MD at Work:  [phone].  [facility] EMERGENCY ROOM PHONE NUMBER: [phone]  IF A COMPLICATION OR EMERGENCY SITUATION ARISES AND YOU ARE UNABLE TO REACH   YOUR PHYSICIAN - GO DIRECTLY TO THE EMERGENCY ROOM.  MD Reji Mcgarry MD  3/10/2023 8:14:00 AM  This report has been verified and signed electronically.  Dear patient,  As a result of recent federal legislation (The Federal Cures Act), you may   receive lab or pathology results from your procedure in your [facility]   account before your physician is able to contact you. Your physician or   their representative will relay the results to you with their   recommendations at their soonest availability.  Thank you,  PROVATION

## 2023-03-10 NOTE — TRANSFER OF CARE
"Anesthesia Transfer of Care Note    Patient: Darien Le    Procedure(s) Performed: Procedure(s) (LRB):  COLONOSCOPY (N/A)  ESOPHAGOGASTRODUODENOSCOPY (EGD) (N/A)    Patient location: PACU    Anesthesia Type: general    Transport from OR: Transported from OR on room air with adequate spontaneous ventilation    Post pain: adequate analgesia    Post assessment: no apparent anesthetic complications    Post vital signs: stable    Level of consciousness: responds to stimulation    Nausea/Vomiting: no nausea/vomiting    Complications: none    Transfer of care protocol was followed      Last vitals:   Visit Vitals  /77 (BP Location: Right arm, Patient Position: Sitting)   Pulse 73   Temp 36.9 °C (98.4 °F) (Oral)   Resp 20   Ht 5' 7" (1.702 m)   Wt 80.3 kg (177 lb)   SpO2 97%   BMI 27.72 kg/m²     "

## 2023-03-10 NOTE — PROVATION PATIENT INSTRUCTIONS
Discharge Summary/Instructions after an Endoscopic Procedure  Patient Name: Darien Le  Patient MRN: [id number]  Patient YOB: 1960  Friday, March 10, 2023  Reji Dsouza MD  RESTRICTIONS:  During your procedure today, you received medications for sedation.  These   medications may affect your judgment, balance and coordination.  Therefore,   for 24 hours, you have the following restrictions:   - DO NOT drive a car, operate machinery, make legal/financial decisions,   sign important papers or drink alcohol.    ACTIVITY:  Today: no heavy lifting, straining or running due to procedural   sedation/anesthesia.  The following day: return to full activity including work.  DIET:  Eat and drink normally unless instructed otherwise.     TREATMENT FOR COMMON SIDE EFFECTS:  - Mild abdominal pain, nausea, belching, bloating or excessive gas:  rest,   eat lightly and use a heating pad.  - Sore Throat: treat with throat lozenges and/or gargle with warm salt   water.  - Because air was used during the procedure, expelling large amounts of air   from your rectum or belching is normal.  - If a bowel prep was taken, you may not have a bowel movement for 1-3 days.    This is normal.  SYMPTOMS TO WATCH FOR AND REPORT TO YOUR PHYSICIAN:  1. Abdominal pain or bloating, other than gas cramps.  2. Chest pain.  3. Back pain.  4. Signs of infection such as: chills or fever occurring within 24 hours   after the procedure.  5. Rectal bleeding, which would show as bright red, maroon, or black stools.   (A tablespoon of blood from the rectum is not serious, especially if   hemorrhoids are present.)  6. Vomiting.  7. Weakness or dizziness.  GO DIRECTLY TO THE NEAREST EMERGENCY ROOM IF YOU HAVE ANY OF THE FOLLOWING:      Difficulty breathing              Chills and/or fever over 101 F   Persistent vomiting and/or vomiting blood   Severe abdominal pain   Severe chest pain   Black, tarry stools   Bleeding- more than one  tablespoon   Any other symptom or condition that you feel may need urgent attention  Your doctor recommends these additional instructions:  If any biopsies were taken, your doctors clinic will contact you in 1 to 2   weeks with any results.  - Await pathology results.   - Discharge patient to home (with parent).   - Return to my office after studies are complete.  For questions, problems or results please call your physician - Reji Dsouza MD at Work:  [phone].  [facility] EMERGENCY ROOM PHONE NUMBER: [phone]  IF A COMPLICATION OR EMERGENCY SITUATION ARISES AND YOU ARE UNABLE TO REACH   YOUR PHYSICIAN - GO DIRECTLY TO THE EMERGENCY ROOM.  MD Reji Mcgarry MD  3/10/2023 8:19:07 AM  This report has been verified and signed electronically.  Dear patient,  As a result of recent federal legislation (The Federal Cures Act), you may   receive lab or pathology results from your procedure in your [facility]   account before your physician is able to contact you. Your physician or   their representative will relay the results to you with their   recommendations at their soonest availability.  Thank you,  PROVATION

## 2023-03-10 NOTE — ANESTHESIA PREPROCEDURE EVALUATION
03/10/2023  Darien Le is a 63 y.o., male.      Pre-op Assessment    I have reviewed the Patient Summary Reports.     I have reviewed the Nursing Notes.    I have reviewed the Medications.     Review of Systems  Anesthesia Hx:  No problems with previous Anesthesia  Neg history of prior surgery. Denies Family Hx of Anesthesia complications.   Denies Personal Hx of Anesthesia complications.   Social:  Smoker    Hematology/Oncology:  Hematology Normal   Oncology Normal     EENT/Dental:EENT/Dental Normal   Cardiovascular:   Hypertension    Pulmonary:   COPD, mild    Renal/:  Renal/ Normal     Hepatic/GI:  Hepatic/GI Normal    Musculoskeletal:  Musculoskeletal Normal    Neurological:  Neurology Normal    Endocrine:  Endocrine Normal    Dermatological:  Skin Normal    Psych:  Psychiatric Normal           Physical Exam  General: Alert    Airway:  Mallampati: II   Mouth Opening: Normal  TM Distance: Normal  Neck ROM: Normal ROM    Dental:  Intact    Chest/Lungs:  Clear to auscultation, Normal Respiratory Rate    Heart:  Rate: Normal    Abdomen:  Normal        Anesthesia Plan  Type of Anesthesia, risks & benefits discussed:    Anesthesia Type: Gen ETT  Post Op Pain Control Plan: multimodal analgesia  Airway Plan: Direct, Post-Induction  Informed Consent: Informed consent signed with the Patient and all parties understand the risks and agree with anesthesia plan.  All questions answered. Patient consented to blood products? No  ASA Score: 3  Day of Surgery Review of History & Physical: H&P Update referred to the surgeon/provider.    Ready For Surgery From Anesthesia Perspective.     .

## 2023-03-10 NOTE — ANESTHESIA POSTPROCEDURE EVALUATION
Anesthesia Post Evaluation    Patient: Darien Le    Procedure(s) Performed: Procedure(s) (LRB):  COLONOSCOPY (N/A)  ESOPHAGOGASTRODUODENOSCOPY (EGD) (N/A)    Final Anesthesia Type: general      Patient location during evaluation: PACU  Patient participation: Yes- Able to Participate  Level of consciousness: awake and awake and alert  Post-procedure vital signs: reviewed and stable  Pain management: adequate  Airway patency: patent    PONV status at discharge: No PONV  Anesthetic complications: no      Cardiovascular status: blood pressure returned to baseline  Respiratory status: unassisted and spontaneous ventilation  Hydration status: euvolemic  Follow-up not needed.          Vitals Value Taken Time   /82 03/10/23 0851   Temp 36.5 °C (97.7 °F) 03/10/23 0815   Pulse 71 03/10/23 0851   Resp 18 03/10/23 0851   SpO2 98 % 03/10/23 0851         Event Time   Out of Recovery 08:44:00         Pain/Mary Anne Score: Mary Anne Score: 10 (3/10/2023  8:42 AM)  Modified Mary Anne Score: 20 (3/10/2023  8:51 AM)

## 2023-03-17 LAB
FINAL PATHOLOGIC DIAGNOSIS: NORMAL
GROSS: NORMAL
Lab: NORMAL

## 2023-03-23 ENCOUNTER — OFFICE VISIT (OUTPATIENT)
Dept: SURGERY | Facility: CLINIC | Age: 63
End: 2023-03-23
Payer: MEDICARE

## 2023-03-23 VITALS — HEART RATE: 67 BPM | OXYGEN SATURATION: 95 % | BODY MASS INDEX: 27.47 KG/M2 | HEIGHT: 67 IN | WEIGHT: 175 LBS

## 2023-03-23 DIAGNOSIS — K44.9 HIATAL HERNIA: Primary | ICD-10-CM

## 2023-03-23 PROCEDURE — 3288F PR FALLS RISK ASSESSMENT DOCUMENTED: ICD-10-PCS | Mod: CPTII,S$GLB,, | Performed by: SURGERY

## 2023-03-23 PROCEDURE — 3008F BODY MASS INDEX DOCD: CPT | Mod: CPTII,S$GLB,, | Performed by: SURGERY

## 2023-03-23 PROCEDURE — 99213 PR OFFICE/OUTPT VISIT, EST, LEVL III, 20-29 MIN: ICD-10-PCS | Mod: S$GLB,,, | Performed by: SURGERY

## 2023-03-23 PROCEDURE — 3008F PR BODY MASS INDEX (BMI) DOCUMENTED: ICD-10-PCS | Mod: CPTII,S$GLB,, | Performed by: SURGERY

## 2023-03-23 PROCEDURE — 3288F FALL RISK ASSESSMENT DOCD: CPT | Mod: CPTII,S$GLB,, | Performed by: SURGERY

## 2023-03-23 PROCEDURE — 1101F PR PT FALLS ASSESS DOC 0-1 FALLS W/OUT INJ PAST YR: ICD-10-PCS | Mod: CPTII,S$GLB,, | Performed by: SURGERY

## 2023-03-23 PROCEDURE — 4010F PR ACE/ARB THEARPY RXD/TAKEN: ICD-10-PCS | Mod: CPTII,S$GLB,, | Performed by: SURGERY

## 2023-03-23 PROCEDURE — 99999 PR PBB SHADOW E&M-EST. PATIENT-LVL III: CPT | Mod: PBBFAC,,, | Performed by: SURGERY

## 2023-03-23 PROCEDURE — 1159F MED LIST DOCD IN RCRD: CPT | Mod: CPTII,S$GLB,, | Performed by: SURGERY

## 2023-03-23 PROCEDURE — 99213 OFFICE O/P EST LOW 20 MIN: CPT | Mod: S$GLB,,, | Performed by: SURGERY

## 2023-03-23 PROCEDURE — 1159F PR MEDICATION LIST DOCUMENTED IN MEDICAL RECORD: ICD-10-PCS | Mod: CPTII,S$GLB,, | Performed by: SURGERY

## 2023-03-23 PROCEDURE — 99999 PR PBB SHADOW E&M-EST. PATIENT-LVL III: ICD-10-PCS | Mod: PBBFAC,,, | Performed by: SURGERY

## 2023-03-23 PROCEDURE — 1101F PT FALLS ASSESS-DOCD LE1/YR: CPT | Mod: CPTII,S$GLB,, | Performed by: SURGERY

## 2023-03-23 PROCEDURE — 4010F ACE/ARB THERAPY RXD/TAKEN: CPT | Mod: CPTII,S$GLB,, | Performed by: SURGERY

## 2023-03-23 RX ORDER — TRIAMCINOLONE ACETONIDE 1 MG/G
CREAM TOPICAL
COMMUNITY
Start: 2023-02-20

## 2023-03-23 NOTE — PROGRESS NOTES
"[facility] Surgery  Follow-up    Subjective:     Chief Complaint:  Follow-up EGD and colonoscopy.    HPI:  Darien Le is a 63 y.o. male presents today for follow-up examination EGD colonoscopy.  Patient since last visit has done well.  Dysphagia continues posterior pharynx.  Nothing seen significant on EGD significant besides hiatal hernia with nonobstructing Schatzki's ring.  Biopsies conducted of the stomach esophagus etcetera benign.  Colonoscopy showed evidence of stool within the colon still however able to see the entire colon however likely missing small polyps.  No obstructing masses.  Patient presents today for follow-up.    Review of Systems   Constitutional:  Negative for appetite change, chills and fever.   HENT:  Positive for trouble swallowing. Negative for congestion, dental problem and drooling.    Eyes:  Negative for photophobia, discharge and itching.   Respiratory:  Negative for apnea and chest tightness.    Cardiovascular:  Negative for chest pain, palpitations and leg swelling.   Gastrointestinal:  Negative for abdominal distention and abdominal pain.   Endocrine: Negative for cold intolerance and heat intolerance.   Genitourinary:  Negative for difficulty urinating and dysuria.   Musculoskeletal:  Negative for arthralgias and back pain.   Skin:  Negative for color change and pallor.   Neurological:  Negative for dizziness, facial asymmetry and headaches.   Hematological:  Negative for adenopathy. Does not bruise/bleed easily.   Psychiatric/Behavioral:  Negative for agitation, behavioral problems and confusion.      Objective:      Vitals:    03/23/23 1047   Pulse: 67   SpO2: 95%   Weight: 79.4 kg (175 lb)   Height: 5' 7" (1.702 m)     Physical Exam  Constitutional:       Appearance: He is well-developed. He is not diaphoretic.   HENT:      Head: Normocephalic and atraumatic.   Eyes:      Pupils: Pupils are equal, round, and reactive to light.   Neck:      Thyroid: No thyromegaly. "   Cardiovascular:      Rate and Rhythm: Normal rate and regular rhythm.      Heart sounds: No murmur heard.  Pulmonary:      Effort: Pulmonary effort is normal. No respiratory distress.      Breath sounds: Normal breath sounds.   Abdominal:      General: Bowel sounds are normal. There is no distension.      Palpations: Abdomen is soft.      Tenderness: There is no abdominal tenderness.   Musculoskeletal:         General: Normal range of motion.      Cervical back: Normal range of motion and neck supple.   Skin:     General: Skin is warm.      Capillary Refill: Capillary refill takes less than 2 seconds.      Findings: No erythema or rash.   Neurological:      Mental Status: He is alert and oriented to person, place, and time.      Cranial Nerves: No cranial nerve deficit.        Assessment:       1. Hiatal hernia          Plan:   Hiatal hernia        Medical Decision Making/Counseling:  Hiatal hernia.  Schatzki's ring not obstructed.  On Protonix.  Continue Protonix.  Dysphagia.  Early pharyngeal.  Possibly posterior head neck related.  ENT evaluations afternoon.  Possible need for laryngoscopy.  Follow-up repeat EGD 2 years with a repeat colonoscopy.    Follow up:  2 years    Patient instructed that best way to communicate with my office staff is for patient to get on the [facility] epic patient portal to expedite communication and communication issues that may occur.  Patient was given instructions on how to get on the portal.  I encouraged patient to obtain portal access as well.  Ultimately it is up to the patient to obtain access.  Patient voiced understanding.

## 2023-04-05 ENCOUNTER — HOSPITAL ENCOUNTER (OUTPATIENT)
Dept: CARDIOLOGY | Facility: HOSPITAL | Age: 63
Discharge: HOME | End: 2023-04-05
Attending: NURSE PRACTITIONER
Payer: MEDICARE

## 2023-04-05 DIAGNOSIS — Z01.810 PRE-OPERATIVE CARDIOVASCULAR EXAMINATION: Primary | ICD-10-CM

## 2023-04-05 DIAGNOSIS — Z01.810 PRE-OPERATIVE CARDIOVASCULAR EXAMINATION: ICD-10-CM

## 2023-04-05 PROCEDURE — 93005 ELECTROCARDIOGRAM TRACING: CPT

## 2023-04-05 PROCEDURE — 93010 EKG 12-LEAD: ICD-10-PCS | Mod: ,,, | Performed by: INTERNAL MEDICINE

## 2023-04-05 PROCEDURE — 93010 ELECTROCARDIOGRAM REPORT: CPT | Mod: ,,, | Performed by: INTERNAL MEDICINE

## 2023-04-11 ENCOUNTER — LAB VISIT (OUTPATIENT)
Dept: LAB | Facility: HOSPITAL | Age: 63
End: 2023-04-11
Attending: SPECIALIST
Payer: MEDICARE

## 2023-04-11 DIAGNOSIS — J38.4 EDEMA OF LARYNX: Primary | ICD-10-CM

## 2023-04-11 LAB
ALBUMIN SERPL BCP-MCNC: 4.3 G/DL (ref 3.5–5.2)
ALP SERPL-CCNC: 53 U/L (ref 55–135)
ALT SERPL W/O P-5'-P-CCNC: 23 U/L (ref 10–44)
ANION GAP SERPL CALC-SCNC: 10 MMOL/L (ref 8–16)
AST SERPL-CCNC: 15 U/L (ref 10–40)
BILIRUB SERPL-MCNC: 0.7 MG/DL (ref 0.1–1)
BUN SERPL-MCNC: 15 MG/DL (ref 8–23)
CALCIUM SERPL-MCNC: 9.5 MG/DL (ref 8.7–10.5)
CHLORIDE SERPL-SCNC: 107 MMOL/L (ref 95–110)
CO2 SERPL-SCNC: 21 MMOL/L (ref 23–29)
CREAT SERPL-MCNC: 0.9 MG/DL (ref 0.5–1.4)
ERYTHROCYTE [DISTWIDTH] IN BLOOD BY AUTOMATED COUNT: 13 % (ref 11.5–14.5)
EST. GFR  (NO RACE VARIABLE): >60 ML/MIN/1.73 M^2
GLUCOSE SERPL-MCNC: 139 MG/DL (ref 70–110)
HCT VFR BLD AUTO: 47.3 % (ref 40–54)
HGB BLD-MCNC: 17 G/DL (ref 14–18)
MCH RBC QN AUTO: 31 PG (ref 27–31)
MCHC RBC AUTO-ENTMCNC: 35.9 G/DL (ref 32–36)
MCV RBC AUTO: 86 FL (ref 82–98)
PLATELET # BLD AUTO: 211 K/UL (ref 150–450)
PMV BLD AUTO: 10.1 FL (ref 9.2–12.9)
POTASSIUM SERPL-SCNC: 4.1 MMOL/L (ref 3.5–5.1)
PROT SERPL-MCNC: 6.9 G/DL (ref 6–8.4)
RBC # BLD AUTO: 5.49 M/UL (ref 4.6–6.2)
SODIUM SERPL-SCNC: 138 MMOL/L (ref 136–145)
WBC # BLD AUTO: 12.82 K/UL (ref 3.9–12.7)

## 2023-04-11 PROCEDURE — 85027 COMPLETE CBC AUTOMATED: CPT | Performed by: SPECIALIST

## 2023-04-11 PROCEDURE — 36415 COLL VENOUS BLD VENIPUNCTURE: CPT | Performed by: SPECIALIST

## 2023-04-11 PROCEDURE — 80053 COMPREHEN METABOLIC PANEL: CPT | Performed by: SPECIALIST

## 2023-08-25 ENCOUNTER — TELEPHONE (OUTPATIENT)
Dept: PULMONOLOGY | Facility: CLINIC | Age: 63
End: 2023-08-25
Payer: MEDICARE

## 2023-08-25 NOTE — TELEPHONE ENCOUNTER
I've tried 2 times to call the patient with the number the phone room put in the message.  It is not a working number.  It will not go through.  I also messaged the phone room that they could have scheduled the appt and had the patient fax the records to our office.

## 2023-08-25 NOTE — TELEPHONE ENCOUNTER
----- Message from Kelin Sorensen sent at 8/25/2023  9:24 AM CDT -----  Type: Needs Medical Advice  Who Called:  pt  Symptoms (please be specific):  COPD  How long has patient had these symptoms:    Pharmacy name and phone #:    Best Call Back Number: [phone]   Additional Information: Pt would like to speak to staff in regards to scheduling appt   Pt states he would like to have records sent over before scheduling appt  Please advise  Thank you

## 2023-09-19 ENCOUNTER — LAB VISIT (OUTPATIENT)
Dept: LAB | Facility: HOSPITAL | Age: 63
End: 2023-09-19
Attending: NURSE PRACTITIONER
Payer: MEDICARE

## 2023-09-19 DIAGNOSIS — J44.89 OBSTRUCTIVE CHRONIC BRONCHITIS WITHOUT EXACERBATION: Primary | ICD-10-CM

## 2023-09-19 LAB
ALBUMIN SERPL BCP-MCNC: 4 G/DL (ref 3.5–5.2)
ALP SERPL-CCNC: 62 U/L (ref 55–135)
ALT SERPL W/O P-5'-P-CCNC: 20 U/L (ref 10–44)
ANION GAP SERPL CALC-SCNC: 12 MMOL/L (ref 8–16)
AST SERPL-CCNC: 16 U/L (ref 10–40)
BASOPHILS # BLD AUTO: 0.06 K/UL (ref 0–0.2)
BASOPHILS NFR BLD: 0.7 % (ref 0–1.9)
BILIRUB SERPL-MCNC: 0.9 MG/DL (ref 0.1–1)
BUN SERPL-MCNC: 15 MG/DL (ref 8–23)
CALCIUM SERPL-MCNC: 9.1 MG/DL (ref 8.7–10.5)
CHLORIDE SERPL-SCNC: 107 MMOL/L (ref 95–110)
CHOLEST SERPL-MCNC: 172 MG/DL (ref 120–199)
CHOLEST/HDLC SERPL: 6.6 {RATIO} (ref 2–5)
CO2 SERPL-SCNC: 22 MMOL/L (ref 23–29)
COMPLEXED PSA SERPL-MCNC: 0.46 NG/ML (ref 0–4)
CREAT SERPL-MCNC: 1 MG/DL (ref 0.5–1.4)
DIFFERENTIAL METHOD: ABNORMAL
EOSINOPHIL # BLD AUTO: 0.3 K/UL (ref 0–0.5)
EOSINOPHIL NFR BLD: 2.7 % (ref 0–8)
ERYTHROCYTE [DISTWIDTH] IN BLOOD BY AUTOMATED COUNT: 13.2 % (ref 11.5–14.5)
EST. GFR  (NO RACE VARIABLE): >60 ML/MIN/1.73 M^2
ESTIMATED AVG GLUCOSE: 91 MG/DL (ref 68–131)
GLUCOSE SERPL-MCNC: 114 MG/DL (ref 70–110)
HBA1C MFR BLD: 4.8 % (ref 4–5.6)
HCT VFR BLD AUTO: 45.1 % (ref 40–54)
HDLC SERPL-MCNC: 26 MG/DL (ref 40–75)
HDLC SERPL: 15.1 % (ref 20–50)
HGB BLD-MCNC: 16.1 G/DL (ref 14–18)
IMM GRANULOCYTES # BLD AUTO: 0.11 K/UL (ref 0–0.04)
IMM GRANULOCYTES NFR BLD AUTO: 1.2 % (ref 0–0.5)
LDLC SERPL CALC-MCNC: 119.4 MG/DL (ref 63–159)
LYMPHOCYTES # BLD AUTO: 1.9 K/UL (ref 1–4.8)
LYMPHOCYTES NFR BLD: 20.3 % (ref 18–48)
MCH RBC QN AUTO: 30.6 PG (ref 27–31)
MCHC RBC AUTO-ENTMCNC: 35.7 G/DL (ref 32–36)
MCV RBC AUTO: 86 FL (ref 82–98)
MONOCYTES # BLD AUTO: 0.9 K/UL (ref 0.3–1)
MONOCYTES NFR BLD: 9.4 % (ref 4–15)
NEUTROPHILS # BLD AUTO: 6 K/UL (ref 1.8–7.7)
NEUTROPHILS NFR BLD: 65.7 % (ref 38–73)
NONHDLC SERPL-MCNC: 146 MG/DL
NRBC BLD-RTO: 0 /100 WBC
PLATELET # BLD AUTO: 225 K/UL (ref 150–450)
PMV BLD AUTO: 10.4 FL (ref 9.2–12.9)
POTASSIUM SERPL-SCNC: 3.8 MMOL/L (ref 3.5–5.1)
PROT SERPL-MCNC: 6.5 G/DL (ref 6–8.4)
RBC # BLD AUTO: 5.26 M/UL (ref 4.6–6.2)
SODIUM SERPL-SCNC: 141 MMOL/L (ref 136–145)
TRIGL SERPL-MCNC: 133 MG/DL (ref 30–150)
WBC # BLD AUTO: 9.15 K/UL (ref 3.9–12.7)

## 2023-09-19 PROCEDURE — 85025 COMPLETE CBC W/AUTO DIFF WBC: CPT | Performed by: NURSE PRACTITIONER

## 2023-09-19 PROCEDURE — 80053 COMPREHEN METABOLIC PANEL: CPT | Performed by: NURSE PRACTITIONER

## 2023-09-19 PROCEDURE — 83036 HEMOGLOBIN GLYCOSYLATED A1C: CPT | Performed by: NURSE PRACTITIONER

## 2023-09-19 PROCEDURE — 36415 COLL VENOUS BLD VENIPUNCTURE: CPT | Performed by: NURSE PRACTITIONER

## 2023-09-19 PROCEDURE — 84153 ASSAY OF PSA TOTAL: CPT | Performed by: NURSE PRACTITIONER

## 2023-09-19 PROCEDURE — 80061 LIPID PANEL: CPT | Performed by: NURSE PRACTITIONER

## 2023-10-23 ENCOUNTER — HOSPITAL ENCOUNTER (OUTPATIENT)
Dept: RADIOLOGY | Facility: HOSPITAL | Age: 63
Discharge: HOME | End: 2023-10-23
Attending: NURSE PRACTITIONER
Payer: MEDICARE

## 2023-10-23 DIAGNOSIS — J44.1 COPD WITH ACUTE EXACERBATION: ICD-10-CM

## 2023-11-29 NOTE — DISCHARGE SUMMARY
Discharge Note        SUMMARY     Admit Date: 3/10/2023    Attending Physician: Reji Dsouza MD     Discharge Physician: Reji Dsouza MD    Discharge Date: 3/10/2023 8:10 AM      Hospital Course: Patient tolerated procedure well.     Disposition: Home or Self Care    Patient Instructions:   Current Discharge Medication List        CONTINUE these medications which have NOT CHANGED    Details   albuterol 90 mcg/actuation inhaler Inhale 2 puffs into the lungs.      amLODIPine (NORVASC) 10 MG tablet Take 10 mg by mouth.      atenolol (TENORMIN) 100 MG tablet Take 100 mg by mouth.      budesonide-formoterol 160-4.5 mcg (SYMBICORT) 160-4.5 mcg/actuation HFAA Inhale 2 puffs into the lungs.      cloNIDine (CATAPRES) 0.2 MG tablet Take 0.1 mg by mouth.      fluticasone-umeclidin-vilanter (TRELEGY ELLIPTA) 100-62.5-25 mcg DsDv INHALE 1 PUFF INTO LUNGS ONCE DAILY AT THE SAME TIME EVERY DAY      hydrALAZINE (APRESOLINE) 25 MG tablet Take 25 mg by mouth.      lisinopril (PRINIVIL,ZESTRIL) 20 MG tablet Take 20 mg by mouth.      montelukast (SINGULAIR) 10 mg tablet Take 10 mg by mouth.      pantoprazole (PROTONIX) 40 MG tablet Take 1 tablet (40 mg total) by mouth once daily.  Qty: 90 tablet, Refills: 0      predniSONE (DELTASONE) 20 MG tablet Take 20 mg by mouth.      tiotropium (SPIRIVA) 18 mcg inhalation capsule Inhale 18 mcg into the lungs.      albuterol sulfate 2.5 mg/0.5 mL Nebu Inhale 1 each into the lungs.      aspirin (ECOTRIN) 81 MG EC tablet Take 81 mg by mouth.      benzonatate (TESSALON) 100 MG capsule Take 100 mg by mouth.      busPIRone (BUSPAR) 5 MG Tab Take 5 mg by mouth 2 (two) times daily.      diclofenac sodium (VOLTAREN) 1 % Gel Apply topically.      fluticasone (FLONASE) 50 mcg/actuation nasal spray 1 spray by Nasal route.      loratadine (CLARITIN) 10 mg tablet TAKE 1 TABLET BY MOUTH ONCE DAILY AS NEEDED      sucralfate (CARAFATE) 1 gram tablet Take 1 tablet (1 g total) by mouth 4 (four)  times daily.  Qty: 120 tablet, Refills: 0             Discharge Procedure Orders (must include Diet, Follow-up, Activity):   Discharge Procedure Orders (must include Diet, Follow-up, Activity)   Diet general     Call MD for:  temperature >100.4     Call MD for:  persistent nausea and vomiting     Call MD for:  severe uncontrolled pain     Call MD for:  difficulty breathing, headache or visual disturbances     Call MD for:  redness, tenderness, or signs of infection (pain, swelling, redness, odor or green/yellow discharge around incision site)     Call MD for:  persistent dizziness or light-headedness        Follow Up:  Follow up as scheduled.  Resume routine diet.  Activity as tolerated.    No driving day of procedure.   Left arm;

## 2024-04-18 ENCOUNTER — HOSPITAL ENCOUNTER (EMERGENCY)
Facility: HOSPITAL | Age: 64
Discharge: HOME | End: 2024-04-18
Attending: EMERGENCY MEDICINE
Payer: MEDICARE

## 2024-04-18 VITALS
RESPIRATION RATE: 24 BRPM | HEIGHT: 67 IN | OXYGEN SATURATION: 96 % | WEIGHT: 174.19 LBS | TEMPERATURE: 98 F | SYSTOLIC BLOOD PRESSURE: 178 MMHG | HEART RATE: 87 BPM | DIASTOLIC BLOOD PRESSURE: 88 MMHG | BODY MASS INDEX: 27.34 KG/M2

## 2024-04-18 DIAGNOSIS — J44.1 COPD EXACERBATION: Primary | ICD-10-CM

## 2024-04-18 DIAGNOSIS — R06.02 SOB (SHORTNESS OF BREATH): ICD-10-CM

## 2024-04-18 PROCEDURE — 99284 EMERGENCY DEPT VISIT MOD MDM: CPT | Mod: 25

## 2024-04-18 PROCEDURE — 93010 ELECTROCARDIOGRAM REPORT: CPT | Mod: ,,, | Performed by: INTERNAL MEDICINE

## 2024-04-18 PROCEDURE — 25000003 PHARM REV CODE 250: Performed by: EMERGENCY MEDICINE

## 2024-04-18 PROCEDURE — 27000221 HC OXYGEN, UP TO 24 HOURS

## 2024-04-18 PROCEDURE — 96372 THER/PROPH/DIAG INJ SC/IM: CPT | Performed by: EMERGENCY MEDICINE

## 2024-04-18 PROCEDURE — 71045 X-RAY EXAM CHEST 1 VIEW: CPT | Mod: 26,,, | Performed by: RADIOLOGY

## 2024-04-18 PROCEDURE — 93005 ELECTROCARDIOGRAM TRACING: CPT

## 2024-04-18 PROCEDURE — 71045 X-RAY EXAM CHEST 1 VIEW: CPT | Mod: TC

## 2024-04-18 PROCEDURE — 63600175 PHARM REV CODE 636 W HCPCS: Performed by: EMERGENCY MEDICINE

## 2024-04-18 PROCEDURE — 94640 AIRWAY INHALATION TREATMENT: CPT

## 2024-04-18 PROCEDURE — 25000242 PHARM REV CODE 250 ALT 637 W/ HCPCS: Performed by: EMERGENCY MEDICINE

## 2024-04-18 RX ORDER — ALBUTEROL SULFATE 90 UG/1
2 AEROSOL, METERED RESPIRATORY (INHALATION) EVERY 4 HOURS PRN
Qty: 8 G | Refills: 2 | Status: SHIPPED | OUTPATIENT
Start: 2024-04-18

## 2024-04-18 RX ORDER — DOXYCYCLINE 100 MG/1
100 CAPSULE ORAL 2 TIMES DAILY
Qty: 20 CAPSULE | Refills: 0 | Status: SHIPPED | OUTPATIENT
Start: 2024-04-18 | End: 2024-04-28

## 2024-04-18 RX ORDER — ALBUTEROL SULFATE 2.5 MG/.5ML
2.5 SOLUTION RESPIRATORY (INHALATION) EVERY 4 HOURS PRN
Qty: 30 EACH | Refills: 2 | Status: SHIPPED | OUTPATIENT
Start: 2024-04-18

## 2024-04-18 RX ORDER — DOXYCYCLINE HYCLATE 100 MG
100 TABLET ORAL
Status: COMPLETED | OUTPATIENT
Start: 2024-04-18 | End: 2024-04-18

## 2024-04-18 RX ORDER — IPRATROPIUM BROMIDE AND ALBUTEROL SULFATE 2.5; .5 MG/3ML; MG/3ML
3 SOLUTION RESPIRATORY (INHALATION)
Status: COMPLETED | OUTPATIENT
Start: 2024-04-18 | End: 2024-04-18

## 2024-04-18 RX ORDER — METHYLPREDNISOLONE SOD SUCC 125 MG
125 VIAL (EA) INJECTION
Status: DISCONTINUED | OUTPATIENT
Start: 2024-04-18 | End: 2024-04-18

## 2024-04-18 RX ORDER — PREDNISONE 20 MG/1
40 TABLET ORAL DAILY
Qty: 10 TABLET | Refills: 0 | Status: SHIPPED | OUTPATIENT
Start: 2024-04-18 | End: 2024-04-23

## 2024-04-18 RX ORDER — METHYLPREDNISOLONE SOD SUCC 125 MG
125 VIAL (EA) INJECTION
Status: COMPLETED | OUTPATIENT
Start: 2024-04-18 | End: 2024-04-18

## 2024-04-18 RX ADMIN — IPRATROPIUM BROMIDE AND ALBUTEROL SULFATE 3 ML: .5; 2.5 SOLUTION RESPIRATORY (INHALATION) at 07:04

## 2024-04-18 RX ADMIN — DOXYCYCLINE HYCLATE 100 MG: 100 TABLET, COATED ORAL at 08:04

## 2024-04-18 RX ADMIN — METHYLPREDNISOLONE SODIUM SUCCINATE 125 MG: 125 INJECTION, POWDER, FOR SOLUTION INTRAMUSCULAR; INTRAVENOUS at 08:04

## 2024-04-19 LAB
OHS QRS DURATION: 104 MS
OHS QTC CALCULATION: 439 MS

## 2024-04-19 NOTE — ED PROVIDER NOTES
Encounter Date: 4/18/2024       History     Chief Complaint   Patient presents with    Shortness of Breath     Since last night with productive cough. 1 albuterol treatment given by EMS     64-year-old male brought in by EMS for shortness of breath initially very argumentative and uncooperative with physical exam.  Now more cooperative patient states that he has had a cough productive of green sputum starting last night.  No chest pain no nausea or vomiting.    The history is provided by the patient, medical records and the EMS personnel.     Review of patient's allergies indicates:   Allergen Reactions    Umeclidinium bromide Swelling    Codeine Nausea And Vomiting     Past Medical History:   Diagnosis Date    Allergy     Arthritis     COPD (chronic obstructive pulmonary disease)     Digestive disorder     Hypertension      Past Surgical History:   Procedure Laterality Date    COLONOSCOPY N/A 8/27/2018    Procedure: COLONOSCOPY;  Surgeon: Reji Dsouza MD;  Location: [facility];  Service: Endoscopy;  Laterality: N/A;  WITH POLYPECTOMY X 2    COLONOSCOPY N/A 3/10/2023    Procedure: COLONOSCOPY;  Surgeon: Reji Dsouza MD;  Location: [facility];  Service: General;  Laterality: N/A;    ESOPHAGOGASTRODUODENOSCOPY N/A 8/27/2018    Procedure: ESOPHAGOGASTRODUODENOSCOPY (EGD);  Surgeon: Reji Dsouza MD;  Location: [facility];  Service: Endoscopy;  Laterality: N/A;  WITH BX    ESOPHAGOGASTRODUODENOSCOPY N/A 3/10/2023    Procedure: ESOPHAGOGASTRODUODENOSCOPY (EGD);  Surgeon: Reji Dsouza MD;  Location: [facility] [place];  Service: General;  Laterality: N/A;    EXCISION OF SOFT TISSUE N/A 10/22/2021    Procedure: EXCISION, SOFT TISSUE;  Surgeon: Reji Dsouza MD;  Location: [facility];  Service: General;  Laterality: N/A;    FUNCTIONAL ENDOSCOPIC SINUS SURGERY (FESS) N/A 3/11/2022    Procedure: FESS (FUNCTIONAL ENDOSCOPIC SINUS SURGERY);  Surgeon: Hood Obando MD;  Location: [facility];   Service: ENT;  Laterality: N/A;    MYRINGOTOMY WITH INSERTION OF VENTILATION TUBE Bilateral 3/11/2022    Procedure: MYRINGOTOMY, WITH TYMPANOSTOMY TUBE INSERTION;  Surgeon: Hood Obando MD;  Location: [facility] OR;  Service: ENT;  Laterality: Bilateral;    NASAL SEPTOPLASTY N/A 3/11/2022    Procedure: SEPTOPLASTY, NOSE;  Surgeon: Hood Obando MD;  Location: [facility] OR;  Service: ENT;  Laterality: N/A;    NASAL TURBINATE REDUCTION Bilateral 3/11/2022    Procedure: REDUCTION, NASAL TURBINATE;  Surgeon: Hood Obando MD;  Location: [facility] OR;  Service: ENT;  Laterality: Bilateral;    NECK SURGERY       Family History   Problem Relation Name Age of Onset    Heart disease Mother      Cancer Father       Social History     Tobacco Use    Smoking status: Every Day     Current packs/day: 2.00     Average packs/day: 2.0 packs/day for 50.0 years (100.0 ttl pk-yrs)     Types: Cigarettes    Smokeless tobacco: Never   Substance Use Topics    Alcohol use: Yes    Drug use: No     Review of Systems   Constitutional:  Negative for fever.   HENT:  Negative for sore throat.    Respiratory:  Positive for cough and shortness of breath.    Cardiovascular:  Negative for chest pain.   Gastrointestinal:  Negative for nausea.   Genitourinary:  Negative for dysuria.   Musculoskeletal:  Negative for back pain.   Skin:  Negative for rash.   Neurological:  Negative for weakness.   Hematological:  Does not bruise/bleed easily.   All other systems reviewed and are negative.      Physical Exam     Initial Vitals   BP Pulse Resp Temp SpO2   04/18/24 1929 04/18/24 1926 04/18/24 1926 04/18/24 1946 04/18/24 1926   (!) 178/88 96 (!) 31 97.9 °F (36.6 °C) (!) 90 %      MAP       --                Physical Exam    Nursing note and vitals reviewed.  Constitutional: He appears distressed.   HENT:   Head: Normocephalic and atraumatic.   Eyes: Pupils are equal, round, and reactive to light.   Neck:   Normal range of motion.  Cardiovascular:  Normal rate  and regular rhythm.           Pulmonary/Chest: He is in respiratory distress. He has wheezes.   Abdominal: Abdomen is soft.   Musculoskeletal:      Cervical back: Normal range of motion.     Neurological: He is alert and oriented to person, place, and time.   Skin: Skin is warm.         ED Course   Procedures  Labs Reviewed - No data to display    EKG Readings: (Independently Interpreted)   Initial Reading: No STEMI.   EKG done at 7:52 p.m. shows a rate of 95, VT interval 150, QRS duration 104, .  My interpretation of the EKGs this is a normal sinus rhythm without any findings concerning for acute ischemia or electrical abnormality at this time.       Imaging Results              X-Ray Chest AP Portable (Final result)  Result time 04/18/24 19:43:45      Final result by Kameron Tai MD (04/18/24 19:43:45)                   Impression:      Chronic coarse interstitial attenuation with new superimposed patchy bibasilar airspace opacities, left greater than right.  Findings may reflect infection/pneumonia appropriate clinical setting.  Continued imaging follow-up advised to ensure appropriate resolution.      Electronically signed by: Kameron Tai MD  Date:    04/18/2024  Time:    19:43               Narrative:    EXAMINATION:  XR CHEST AP PORTABLE    CLINICAL HISTORY:  SOB;    TECHNIQUE:  Single frontal view of the chest was performed.    COMPARISON:  10/23/2023    FINDINGS:  Cardiac monitoring leads overlie the chest.  Cardiac silhouette is stable in size and configuration. There is atherosclerotic calcification of the thoracic aorta.  Lungs demonstrate chronic coarse interstitial attenuation. There are patchy bibasilar airspace opacities, left greater than right, which may reflect superimposed infection in the appropriate clinical setting.  No significant volume of pleural fluid or pneumothorax appreciated. Osseous structures demonstrate degenerative changes.                                        Medications   albuterol-ipratropium 2.5 mg-0.5 mg/3 mL nebulizer solution 3 mL (3 mLs Nebulization Given 4/18/24 1936)   albuterol-ipratropium 2.5 mg-0.5 mg/3 mL nebulizer solution 3 mL (3 mLs Nebulization Given 4/18/24 1936)   doxycycline tablet 100 mg (100 mg Oral Given 4/18/24 2042)   methylPREDNISolone sodium succinate injection 125 mg (125 mg Intramuscular Given 4/18/24 2041)     Medical Decision Making  Differential diagnosis includes pneumonia, COPD exacerbation, bronchitis, influenza, COVID.    Unable to establish IV obtain blood work from this patient.  He feels significantly better after 2 breathing treatments.  He is refusing repeated blood strict or IV placement.  Blood work will be canceled patient will be discharged with prescriptions for prednisone doxycycline and more albuterol.  Patient states he has a follow up appointment with his pulmonologist tomorrow.  Wife is a nurse and feels comfortable taking him home.    Amount and/or Complexity of Data Reviewed  Labs: ordered.  Radiology: ordered. Decision-making details documented in ED Course.  ECG/medicine tests:  Decision-making details documented in ED Course.    Risk  Prescription drug management.                                      Clinical Impression:  Final diagnoses:  [R06.02] SOB (shortness of breath)  [J44.1] COPD exacerbation (Primary)          ED Disposition Condition    Discharge Stable          ED Prescriptions       Medication Sig Dispense Start Date End Date Auth. Provider    doxycycline (VIBRAMYCIN) 100 MG Cap Take 1 capsule (100 mg total) by mouth 2 (two) times daily. for 10 days 20 capsule 4/18/2024 4/28/2024 Any Hdz MD    predniSONE (DELTASONE) 20 MG tablet Take 2 tablets (40 mg total) by mouth once daily. for 5 days 10 tablet 4/18/2024 4/23/2024 Any Hdz MD    albuterol sulfate 2.5 mg/0.5 mL Nebu Take 2.5 mg by nebulization every 4 (four) hours as needed. 30 each 4/18/2024 -- Any Hdz MD    albuterol  (PROVENTIL/VENTOLIN HFA) 90 mcg/actuation inhaler Inhale 2 puffs into the lungs every 4 (four) hours as needed for Wheezing. 8 g 4/18/2024 -- Any Hdz MD          Follow-up Information    None          Any Hdz MD  04/18/24 [id number]

## 2024-05-16 ENCOUNTER — HOSPITAL ENCOUNTER (OUTPATIENT)
Dept: RADIOLOGY | Facility: HOSPITAL | Age: 64
Discharge: HOME | End: 2024-05-16
Attending: NURSE PRACTITIONER
Payer: MEDICARE

## 2024-05-16 DIAGNOSIS — J18.9 PNEUMONIA: ICD-10-CM

## 2024-05-16 PROCEDURE — 71046 X-RAY EXAM CHEST 2 VIEWS: CPT | Mod: 26,,, | Performed by: RADIOLOGY

## 2024-05-16 PROCEDURE — 71046 X-RAY EXAM CHEST 2 VIEWS: CPT | Mod: TC

## 2024-11-07 ENCOUNTER — HOSPITAL ENCOUNTER (OUTPATIENT)
Dept: RADIOLOGY | Facility: HOSPITAL | Age: 64
Discharge: HOME | End: 2024-11-07
Attending: NURSE PRACTITIONER
Payer: MEDICARE

## 2024-11-07 DIAGNOSIS — J44.9 COPD (CHRONIC OBSTRUCTIVE PULMONARY DISEASE): ICD-10-CM

## 2024-11-07 DIAGNOSIS — J44.9 COPD (CHRONIC OBSTRUCTIVE PULMONARY DISEASE): Primary | ICD-10-CM

## 2024-11-07 PROCEDURE — 71046 X-RAY EXAM CHEST 2 VIEWS: CPT | Mod: 26,,, | Performed by: RADIOLOGY

## 2024-11-07 PROCEDURE — 71046 X-RAY EXAM CHEST 2 VIEWS: CPT | Mod: TC

## 2024-11-11 DIAGNOSIS — R51.9 HA (HEADACHE): Primary | ICD-10-CM

## 2024-11-25 ENCOUNTER — TELEPHONE (OUTPATIENT)
Dept: CARDIOLOGY | Facility: CLINIC | Age: 64
End: 2024-11-25
Payer: MEDICARE

## 2024-11-25 NOTE — TELEPHONE ENCOUNTER
----- Message from Enma sent at 11/25/2024 12:47 PM CST -----  Contact: Sarkis [phone]  Type:  Patient Returning Call    Who Called:  Pts wife Sarkis   Who Left Message for Patient:  Gale   Does the patient know what this is regarding?:  Appt   Best Call Back Number:  [phone]    Additional Information:  Pls call back

## 2024-11-29 ENCOUNTER — HOSPITAL ENCOUNTER (OUTPATIENT)
Dept: RADIOLOGY | Facility: HOSPITAL | Age: 64
Discharge: HOME | End: 2024-11-29
Attending: NURSE PRACTITIONER
Payer: MEDICARE

## 2024-11-29 DIAGNOSIS — R51.9 HA (HEADACHE): ICD-10-CM

## 2024-11-29 PROCEDURE — 70551 MRI BRAIN STEM W/O DYE: CPT | Mod: 26,,, | Performed by: RADIOLOGY

## 2024-11-29 PROCEDURE — 70551 MRI BRAIN STEM W/O DYE: CPT | Mod: TC,PO

## 2025-01-07 ENCOUNTER — TELEPHONE (OUTPATIENT)
Dept: CARDIOLOGY | Facility: CLINIC | Age: 65
End: 2025-01-07
Payer: MEDICARE

## 2025-01-07 NOTE — TELEPHONE ENCOUNTER
----- Message from Nina sent at 1/7/2025 10:10 AM CST -----  Contact: XUAN, WIFE  Type:  Sooner NP Apoointment Request    Caller is requesting a sooner appointment.  Caller declined first available appointment listed below.  Caller will not accept being placed on the waitlist and is requesting a message be sent to doctor.  Name of Caller:XUAN, WIFE   When is the first available appointment? [facility] for 02/26/2025  Symptoms: Establish care / off and on tingling in arms and chest  Would the patient rather a call back or a response via [facility]? Call   Best Call Back Number: [phone] or [phone]  Additional Information: Thank you

## 2025-02-07 ENCOUNTER — HOSPITAL ENCOUNTER (OUTPATIENT)
Dept: RADIOLOGY | Facility: HOSPITAL | Age: 65
Discharge: HOME | End: 2025-02-07
Attending: NURSE PRACTITIONER
Payer: MEDICARE

## 2025-02-07 DIAGNOSIS — J44.9 COPD (CHRONIC OBSTRUCTIVE PULMONARY DISEASE): ICD-10-CM

## 2025-02-07 DIAGNOSIS — R06.09 OTHER FORMS OF DYSPNEA: ICD-10-CM

## 2025-02-07 DIAGNOSIS — J43.2 CENTRILOBULAR EMPHYSEMA: ICD-10-CM

## 2025-02-07 DIAGNOSIS — J42 UNSPECIFIED CHRONIC BRONCHITIS: ICD-10-CM

## 2025-02-07 PROCEDURE — 71271 CT THORAX LUNG CANCER SCR C-: CPT | Mod: TC

## 2025-02-07 PROCEDURE — 71271 CT THORAX LUNG CANCER SCR C-: CPT | Mod: 26,,, | Performed by: RADIOLOGY

## 2025-02-17 ENCOUNTER — OFFICE VISIT (OUTPATIENT)
Dept: CARDIOLOGY | Facility: CLINIC | Age: 65
End: 2025-02-17
Payer: MEDICARE

## 2025-02-17 VITALS
HEIGHT: 67 IN | SYSTOLIC BLOOD PRESSURE: 134 MMHG | RESPIRATION RATE: 17 BRPM | HEART RATE: 70 BPM | BODY MASS INDEX: 27 KG/M2 | WEIGHT: 172 LBS | DIASTOLIC BLOOD PRESSURE: 78 MMHG

## 2025-02-17 DIAGNOSIS — I73.9 PERIPHERAL VASCULAR DISEASE, UNSPECIFIED: Primary | ICD-10-CM

## 2025-02-17 DIAGNOSIS — I65.22 STENOSIS OF LEFT CAROTID ARTERY: ICD-10-CM

## 2025-02-17 DIAGNOSIS — E78.5 DYSLIPIDEMIA: ICD-10-CM

## 2025-02-17 DIAGNOSIS — R13.10 DYSPHAGIA, UNSPECIFIED TYPE: ICD-10-CM

## 2025-02-17 DIAGNOSIS — J41.1 MUCOPURULENT CHRONIC BRONCHITIS: ICD-10-CM

## 2025-02-17 DIAGNOSIS — R07.89 OTHER CHEST PAIN: ICD-10-CM

## 2025-02-17 DIAGNOSIS — I10 PRIMARY HYPERTENSION: ICD-10-CM

## 2025-02-17 PROBLEM — I77.9 LEFT-SIDED CAROTID ARTERY DISEASE: Status: ACTIVE | Noted: 2025-02-17

## 2025-02-17 LAB
OHS QRS DURATION: 104 MS
OHS QTC CALCULATION: 431 MS

## 2025-02-17 NOTE — ASSESSMENT & PLAN NOTE
Patient has recurrent chest pains with some atypical features.  His EKG shows normal sinus rhythm within normal limits.  He is still has some symptoms of dysphagia as well.  Although he has multiple risk factors for advanced coronary disease including dyslipidemia arterial hypertension continue use of tobacco recommend to obtain a Lexiscan Myoview to evaluate the extent of ischemia and angiographic assessment for revascularization as directed by areas of ischemia

## 2025-02-17 NOTE — ASSESSMENT & PLAN NOTE
Blood pressure is 134/78 mm Hg continue on amlodipine 10 mg daily, atenolol 100 mg a day, he is off clonidine at this time and continue on lisinopril 20 mg daily at nighttime.  Maintain on low-salt diet hydralazine 25 mg b.i.d..  Continue the same

## 2025-02-17 NOTE — PROGRESS NOTES
Subjective:    Patient ID:  Darien Le is a 65 y.o. male patient here for evaluation Establish Care (Pt states that he has been having chest pain X1 year) and Medication Refill      History of Present Illness:       History of Present Illness:   Patient is a 65-year-old gentleman with long history of tobacco usage smokes about a pack and a half cigarettes a day has history of COPD, arterial hypertension some dysphagia in the past history of carotid artery disease and peripheral arterial disease seeking evaluation for intermittent episodes of chest pain going on for about a year duration.  Reportedly he has about 2 episodes a month lasting varying duration previously had some relief with nitroglycerin but lately he has been beating in his chest and thumping his chest with relief of symptoms apparently.  And a associated to that he has carotid disease chronic paresthesias in upper extremities bilaterally and paresthesias in both lower extremities going on for over a year.  He has been followed by neurologist and [place]  Occasional nausea but no vomiting noted.  This has no blood in the stools or black stools  Denies having any abdominal pain.  He had CTA of his chest done noted to have nodules measuring up to 6 mm.  And is due to follow-up with his pulmonologist and [place] periodic monitoring as recommended.              Review of patient's allergies indicates:   Allergen Reactions    Umeclidinium bromide Swelling    Codeine Nausea And Vomiting       Past Medical History:   Diagnosis Date    Allergy     Arthritis     COPD (chronic obstructive pulmonary disease)     Digestive disorder     Hypertension      Past Surgical History:   Procedure Laterality Date    COLONOSCOPY N/A 8/27/2018    Procedure: COLONOSCOPY;  Surgeon: Reji Dsouza MD;  Location: [facility];  Service: Endoscopy;  Laterality: N/A;  WITH POLYPECTOMY X 2    COLONOSCOPY N/A 3/10/2023    Procedure: COLONOSCOPY;  Surgeon: Reji CHAVEZ  MD Meet;  Location: [facility] ENDO;  Service: General;  Laterality: N/A;    ESOPHAGOGASTRODUODENOSCOPY N/A 8/27/2018    Procedure: ESOPHAGOGASTRODUODENOSCOPY (EGD);  Surgeon: Reji Dsouza MD;  Location: [facility] ENDO;  Service: Endoscopy;  Laterality: N/A;  WITH BX    ESOPHAGOGASTRODUODENOSCOPY N/A 3/10/2023    Procedure: ESOPHAGOGASTRODUODENOSCOPY (EGD);  Surgeon: Reji Dsouza MD;  Location: [facility] ENDO;  Service: General;  Laterality: N/A;    EXCISION OF SOFT TISSUE N/A 10/22/2021    Procedure: EXCISION, SOFT TISSUE;  Surgeon: Reji Dsouza MD;  Location: [facility] OR;  Service: General;  Laterality: N/A;    FUNCTIONAL ENDOSCOPIC SINUS SURGERY (FESS) N/A 3/11/2022    Procedure: FESS (FUNCTIONAL ENDOSCOPIC SINUS SURGERY);  Surgeon: Hood Obando MD;  Location: [facility] OR;  Service: ENT;  Laterality: N/A;    MYRINGOTOMY WITH INSERTION OF VENTILATION TUBE Bilateral 3/11/2022    Procedure: MYRINGOTOMY, WITH TYMPANOSTOMY TUBE INSERTION;  Surgeon: Hood Obando MD;  Location: [facility] OR;  Service: ENT;  Laterality: Bilateral;    NASAL SEPTOPLASTY N/A 3/11/2022    Procedure: SEPTOPLASTY, NOSE;  Surgeon: Hood Obando MD;  Location: [facility] OR;  Service: ENT;  Laterality: N/A;    NASAL TURBINATE REDUCTION Bilateral 3/11/2022    Procedure: REDUCTION, NASAL TURBINATE;  Surgeon: Hood Obando MD;  Location: [facility] OR;  Service: ENT;  Laterality: Bilateral;    NECK SURGERY       Social History[1]     Review of Systems   As noted in HPI in addition     Constitutional: Negative for chills, fatigue and fever.   Eyes:  Occasional intermittent episodes of blurred vision and double vision is reported.  While watching TV   Neuro:  Periodic episodes of headaches and dizziness.    Respiratory:  Shortness of breath with COPD noted in [place] congested cough is present and sputum production with discoloration noted.    Cardiovascular:  Recurrent episodes of chest pain described above no palpitations some swelling  in his legs  Gastrointestinal: Negative for abdominal pain, No melena, diarrhea, nausea and vomiting.  Periodic episodes of dysphagia  Genitourinary: Negative for dysuria and frequency, Negative for hematuria  Skin: Negative for bruising, Negative for edema or discoloration noted.   Endocrine: Negative for polyphagia, Negative for heat intolerance, Negative for cold intolerance  Psychiatric: Negative for depression, Negative for anxiety, progressive episodes of memory loss   Musculoskeletal:  Chronic neck pain and back pain noted.  And lower extremity pains with ambulation limited  Positive for claudication symptoms     Objective        Vitals:    02/17/25 0850   BP: 134/78   Pulse: 70   Resp: 17       LIPIDS - LAST 2   Lab Results   Component Value Date    CHOL 172 09/19/2023    HDL 26 (L) 09/19/2023    LDLCALC 119.4 09/19/2023    TRIG 133 09/19/2023    CHOLHDL 15.1 (L) 09/19/2023       CBC - LAST 2  Lab Results   Component Value Date    WBC 9.15 09/19/2023    WBC 12.82 (H) 04/11/2023    RBC 5.26 09/19/2023    RBC 5.49 04/11/2023    HGB 16.1 09/19/2023    HGB 17.0 04/11/2023    HCT 45.1 09/19/2023    HCT 47.3 04/11/2023    MCV 86 09/19/2023    MCV 86 04/11/2023    MCH 30.6 09/19/2023    MCH 31.0 04/11/2023    MCHC 35.7 09/19/2023    MCHC 35.9 04/11/2023    RDW 13.2 09/19/2023    RDW 13.0 04/11/2023     09/19/2023     04/11/2023    MPV 10.4 09/19/2023    MPV 10.1 04/11/2023    GRAN 6.0 09/19/2023    GRAN 65.7 09/19/2023    LYMPH 1.9 09/19/2023    LYMPH 20.3 09/19/2023    MONO 0.9 09/19/2023    MONO 9.4 09/19/2023    BASO 0.06 09/19/2023    BASO 0.07 10/20/2021    NRBC 0 09/19/2023    NRBC 0 10/20/2021       CHEMISTRY & LIVER FUNCTION - LAST 2  Lab Results   Component Value Date     09/19/2023     04/11/2023    K 3.8 09/19/2023    K 4.1 04/11/2023     09/19/2023     04/11/2023    CO2 22 (L) 09/19/2023    CO2 21 (L) 04/11/2023    ANIONGAP 12 09/19/2023    ANIONGAP 10 04/11/2023     "BUN 15 09/19/2023    BUN 15 04/11/2023    CREATININE 1.0 09/19/2023    CREATININE 0.9 04/11/2023     (H) 09/19/2023     (H) 04/11/2023    CALCIUM 9.1 09/19/2023    CALCIUM 9.5 04/11/2023    ALBUMIN 4.0 09/19/2023    ALBUMIN 4.3 04/11/2023    PROT 6.5 09/19/2023    PROT 6.9 04/11/2023    ALKPHOS 62 09/19/2023    ALKPHOS 53 (L) 04/11/2023    ALT 20 09/19/2023    ALT 23 04/11/2023    AST 16 09/19/2023    AST 15 04/11/2023    BILITOT 0.9 09/19/2023    BILITOT 0.7 04/11/2023        CARDIAC PROFILE - LAST 2  No results found for: "BNP", "CPK", "CPKMB", "LDH", "TROPONINI", "TROPONINIHS"     COAGULATION - LAST 2  No results found for: "LABPT", "INR", "APTT"    ENDOCRINE & PSA - LAST 2  Lab Results   Component Value Date    HGBA1C 4.8 09/19/2023    PSA 0.46 09/19/2023        ECHOCARDIOGRAM RESULTS  No results found for this or any previous visit.      CURRENT/PREVIOUS VISIT EKG  Results for orders placed or performed during the hospital encounter of 04/18/24   EKG 12-lead    Collection Time: 04/18/24  7:52 PM   Result Value Ref Range    QRS Duration 104 ms    [facility] QTC Calculation 439 ms    Narrative    Test Reason : R06.02,    Vent. Rate : 095 BPM     Atrial Rate : 095 BPM     P-R Int : 150 ms          QRS Dur : 104 ms      QT Int : 350 ms       P-R-T Axes : 076 -25 073 degrees     QTc Int : 439 ms    Normal sinus rhythm  Normal ECG  When compared with ECG of 05-APR-2023 14:15,  No significant change was found  Confirmed by Frank Nelson MD ([id number]) on 4/19/2024 8:29:58 AM    Referred By: [email or web address]   SELF           Confirmed By:Frank Nelson MD     No valid procedures specified.   No results found for this or any previous visit.    No valid procedures specified.          PREVIOUS STRESS TEST              PREVIOUS ANGIOGRAM        PHYSICAL EXAM    GENERAL: well built, well nourished, well-developed in no apparent distress alert and oriented.   HEENT: Normocephalic. Pupils normal and conjunctivae normal.  Mucous membranes " normal, no cyanosis or icterus, trachea central,no pallor or icterus is noted..   NECK: No JVD.  Carotid bruits, surgical scar noted in the left neck  THYROID: Thyroid not enlarged. No nodules present..   CARDIAC: Regular rate and rhythm.  Normal S1-S2 no distinct S3 noted.    CHEST ANATOMY: normal.   LUNGS:  Coarse crackles bilaterally without active wheezing.   ABDOMEN: Soft no masses or organomegaly.  No abdomen pulsations   Normal bowel sounds. No pulsations and no masses felt, No guarding or rebound.   EXTREMITIES: No cyanosis, clubbing or edema noted at this time., no calf tenderness bilaterally.   PERIPHERAL VASCULAR SYSTEM:  Diminished distal pulses.   CENTRAL NERVOUS SYSTEM: No focal motor or sensory deficits noted.  A detailed neuro exam was not performed  SKIN: Skin without lesions, moist, well perfused.   MUSCLE STRENGTH & TONE: No noteable weakness, atrophy or abnormal movement.     I HAVE REVIEWED :    The vital signs, nurses notes, and all the pertinent radiology and labs.    02/17/2025 EKG shows normal sinus rhythm within normal limits    Current Outpatient Medications   Medication Instructions    albuterol (PROVENTIL/VENTOLIN HFA) 90 mcg/actuation inhaler 2 puffs, Inhalation, Every 4 hours PRN    albuterol sulfate 2.5 mg, Nebulization, Every 4 hours PRN    amLODIPine (NORVASC) 10 mg    aspirin (ECOTRIN) 81 mg    atenoloL (TENORMIN) 100 mg    benzonatate (TESSALON) 100 mg    budesonide-formoterol 160-4.5 mcg (SYMBICORT) 160-4.5 mcg/actuation HFAA 2 puffs    busPIRone (BUSPAR) 5 mg, Oral, 2 times daily    diclofenac sodium (VOLTAREN) 1 % Gel Apply topically.    fluticasone (FLONASE) 50 mcg/actuation nasal spray 1 spray    fluticasone-umeclidin-vilanter (TRELEGY ELLIPTA) 100-62.5-25 mcg DsDv INHALE 1 PUFF INTO LUNGS ONCE DAILY AT THE SAME TIME EVERY DAY    hydrALAZINE (APRESOLINE) 25 mg    lisinopriL (PRINIVIL,ZESTRIL) 20 mg    loratadine (CLARITIN) 10 mg tablet TAKE 1 TABLET BY MOUTH ONCE DAILY AS  NEEDED    montelukast (SINGULAIR) 10 mg    pantoprazole (PROTONIX) 40 mg, Oral, Daily    sucralfate (CARAFATE) 1 g, Oral, 4 times daily    tiotropium (SPIRIVA) 18 mcg, Inhalation    triamcinolone acetonide 0.1% (KENALOG) 0.1 % cream SMARTSIG:sparingly Topical Twice Daily          Assessment & Plan     1. Peripheral vascular disease, unspecified  Assessment & Plan:  History of peripheral vascular disease.  Recommend to obtain abdominal aortography and runoff in both lower extremities in the meanwhile continue on risk factor modification.    Orders:  -     IN OFFICE EKG 12-LEAD (to Muse)  -     CTA Runoff ABD PEL Bilat Lower Ext; Future; Expected date: 02/17/2025  -     Echo; Future; Expected date: 02/17/2025  -     Nuclear Stress - Cardiology Interpreted; Future  -     Comprehensive Metabolic Panel; Future; Expected date: 02/17/2025  -     CBC Without Differential; Future; Expected date: 02/17/2025  -     Magnesium; Future; Expected date: 02/17/2025  -     Lipid Panel; Future; Expected date: 02/17/2025  -     TSH; Future; Expected date: 02/17/2025    2. Other chest pain  Assessment & Plan:  Patient has recurrent chest pains with some atypical features.  His EKG shows normal sinus rhythm within normal limits.  He is still has some symptoms of dysphagia as well.  Although he has multiple risk factors for advanced coronary disease including dyslipidemia arterial hypertension continue use of tobacco recommend to obtain a Lexiscan Myoview to evaluate the extent of ischemia and angiographic assessment for revascularization as directed by areas of ischemia    Orders:  -     IN OFFICE EKG 12-LEAD (to Muse)  -     Echo; Future; Expected date: 02/17/2025  -     Nuclear Stress - Cardiology Interpreted; Future  -     Comprehensive Metabolic Panel; Future; Expected date: 02/17/2025  -     CBC Without Differential; Future; Expected date: 02/17/2025  -     Magnesium; Future; Expected date: 02/17/2025  -     Lipid Panel; Future;  Expected date: 02/17/2025  -     TSH; Future; Expected date: 02/17/2025    3. Primary hypertension  Assessment & Plan:  Blood pressure is 134/78 mm Hg continue on amlodipine 10 mg daily, atenolol 100 mg a day, he is off clonidine at this time and continue on lisinopril 20 mg daily at nighttime.  Maintain on low-salt diet hydralazine 25 mg b.i.d..  Continue the same      Orders:  -     IN OFFICE EKG 12-LEAD (to Muse)  -     Echo; Future; Expected date: 02/17/2025  -     Nuclear Stress - Cardiology Interpreted; Future  -     Comprehensive Metabolic Panel; Future; Expected date: 02/17/2025  -     CBC Without Differential; Future; Expected date: 02/17/2025  -     Magnesium; Future; Expected date: 02/17/2025  -     Lipid Panel; Future; Expected date: 02/17/2025  -     TSH; Future; Expected date: 02/17/2025    4. Dyslipidemia  Assessment & Plan:  Dyslipidemia needs re-evaluation with lipid levels and appropriate therapy for the same.    Orders:  -     IN OFFICE EKG 12-LEAD (to Muse)  -     Echo; Future; Expected date: 02/17/2025  -     Nuclear Stress - Cardiology Interpreted; Future  -     Comprehensive Metabolic Panel; Future; Expected date: 02/17/2025  -     CBC Without Differential; Future; Expected date: 02/17/2025  -     Magnesium; Future; Expected date: 02/17/2025  -     Lipid Panel; Future; Expected date: 02/17/2025  -     TSH; Future; Expected date: 02/17/2025    5. Stenosis of left carotid artery  Assessment & Plan:  Status post left carotid endarterectomy clinically stable maintain on antiplatelet therapy he will need lipid lowering agents.  Recheck his lipid levels for secondary prevention    Orders:  -     IN OFFICE EKG 12-LEAD (to Muse)  -     Echo; Future; Expected date: 02/17/2025  -     Nuclear Stress - Cardiology Interpreted; Future  -     Comprehensive Metabolic Panel; Future; Expected date: 02/17/2025  -     CBC Without Differential; Future; Expected date: 02/17/2025  -     Magnesium; Future; Expected  date: 02/17/2025  -     Lipid Panel; Future; Expected date: 02/17/2025  -     TSH; Future; Expected date: 02/17/2025    6. Mucopurulent chronic bronchitis  Assessment & Plan:  He is currently being followed by pulmonologist had [place] recommend to continue same medicines      7. Dysphagia, unspecified type  Assessment & Plan:  Has history of dysphagia continue on Carafate and Protonix for the time being             No follow-ups on file.              [1]   Social History  Tobacco Use    Smoking status: Every Day     Current packs/day: 2.00     Average packs/day: 2.0 packs/day for 50.0 years (100.0 ttl pk-yrs)     Types: Cigarettes    Smokeless tobacco: Never   Substance Use Topics    Alcohol use: Yes    Drug use: No

## 2025-02-17 NOTE — ASSESSMENT & PLAN NOTE
History of peripheral vascular disease.  Recommend to obtain abdominal aortography and runoff in both lower extremities in the meanwhile continue on risk factor modification.

## 2025-02-17 NOTE — ASSESSMENT & PLAN NOTE
Status post left carotid endarterectomy clinically stable maintain on antiplatelet therapy he will need lipid lowering agents.  Recheck his lipid levels for secondary prevention

## 2025-02-24 ENCOUNTER — TELEPHONE (OUTPATIENT)
Dept: CARDIOLOGY | Facility: HOSPITAL | Age: 65
End: 2025-02-24

## 2025-02-24 NOTE — TELEPHONE ENCOUNTER
Left message on voicemail.    Patient advised, test will be at [facility] ([address]  Will need to register on the first floor at the main entrance.  Patient advised that arrival time is 07:20.  Patient advised that they may be here about 3.5-4 hours, and may want to bring something to occupy their time, as there will be periods of waiting.    Patient advised, may take his medications prior to testing if you need to. Advised if medications are taken with food, please keep it light, like toast and juice.    Patient advised to avoid all caffeine 12 hours prior to testing.  This includes sodas, chocolate, decaf tea and coffee.    Will provide peanut butter crackers for a snack after stress test.  If patient would prefer something else, please bring a snack from home.    Wear comfortable clothing.  No lotions, oils, or powders to the upper chest area. May wear deodorant.    No metal jewelry, buttons, or zippers to the upper body.      Will send instructions via [employer] as well.

## 2025-02-25 ENCOUNTER — HOSPITAL ENCOUNTER (OUTPATIENT)
Dept: RADIOLOGY | Facility: HOSPITAL | Age: 65
Discharge: HOME | End: 2025-02-25
Attending: INTERNAL MEDICINE
Payer: MEDICARE

## 2025-02-25 ENCOUNTER — HOSPITAL ENCOUNTER (OUTPATIENT)
Dept: CARDIOLOGY | Facility: HOSPITAL | Age: 65
Discharge: HOME | End: 2025-02-25
Attending: INTERNAL MEDICINE
Payer: MEDICARE

## 2025-02-25 VITALS — WEIGHT: 171.94 LBS | HEIGHT: 67 IN | BODY MASS INDEX: 26.99 KG/M2

## 2025-02-25 DIAGNOSIS — I10 PRIMARY HYPERTENSION: ICD-10-CM

## 2025-02-25 DIAGNOSIS — E78.5 DYSLIPIDEMIA: ICD-10-CM

## 2025-02-25 DIAGNOSIS — I73.9 PERIPHERAL VASCULAR DISEASE, UNSPECIFIED: ICD-10-CM

## 2025-02-25 DIAGNOSIS — R07.89 OTHER CHEST PAIN: ICD-10-CM

## 2025-02-25 DIAGNOSIS — I65.22 STENOSIS OF LEFT CAROTID ARTERY: ICD-10-CM

## 2025-02-25 LAB
AORTIC ROOT ANNULUS: 3.5 CM
AORTIC VALVE CUSP SEPERATION: 1.7 CM
APICAL FOUR CHAMBER EJECTION FRACTION: 53 %
APICAL TWO CHAMBER EJECTION FRACTION: 50 %
AV INDEX (PROSTH): 0.71
AV MEAN GRADIENT: 4 MMHG
AV PEAK GRADIENT: 8 MMHG
AV VALVE AREA BY VELOCITY RATIO: 3 CM²
AV VALVE AREA: 2.9 CM²
AV VELOCITY RATIO: 0.71
BSA FOR ECHO PROCEDURE: 1.92 M2
CV ECHO LV RWT: 0.44 CM
CV PHARM DOSE: 0.4 MG
CV STRESS BASE HR: 70 BPM
DIASTOLIC BLOOD PRESSURE: 88 MMHG
DOP CALC AO PEAK VEL: 1.4 M/S
DOP CALC AO VTI: 28.8 CM
DOP CALC LVOT AREA: 4.2 CM2
DOP CALC LVOT DIAMETER: 2.3 CM
DOP CALC LVOT PEAK VEL: 1 M/S
DOP CALC LVOT STROKE VOLUME: 84.7 CM3
DOP CALC MV VTI: 28.8 CM
DOP CALCLVOT PEAK VEL VTI: 20.4 CM
E WAVE DECELERATION TIME: 190 MSEC
E/A RATIO: 0.84
E/E' RATIO: 10 M/S
ECHO LV POSTERIOR WALL: 1.1 CM (ref 0.6–1.1)
EJECTION FRACTION- HIGH: 65 %
END DIASTOLIC INDEX-HIGH: 153 ML/M2
END DIASTOLIC INDEX-LOW: 93 ML/M2
END SYSTOLIC INDEX-HIGH: 71 ML/M2
END SYSTOLIC INDEX-LOW: 31 ML/M2
FRACTIONAL SHORTENING: 28 % (ref 28–44)
INTERVENTRICULAR SEPTUM: 1.1 CM (ref 0.6–1.1)
IVC DIAMETER: 2.04 CM
IVRT: 67 MSEC
LEFT ATRIUM AREA SYSTOLIC (APICAL 2 CHAMBER): 18.5 CM2
LEFT ATRIUM AREA SYSTOLIC (APICAL 4 CHAMBER): 16.7 CM2
LEFT ATRIUM SIZE: 3.3 CM
LEFT ATRIUM VOLUME INDEX MOD: 26 ML/M2
LEFT ATRIUM VOLUME MOD: 50 ML
LEFT INTERNAL DIMENSION IN SYSTOLE: 3.6 CM (ref 2.1–4)
LEFT VENTRICLE DIASTOLIC VOLUME INDEX: 62.63 ML/M2
LEFT VENTRICLE DIASTOLIC VOLUME: 119 ML
LEFT VENTRICLE END DIASTOLIC VOLUME APICAL 2 CHAMBER: 88.3 ML
LEFT VENTRICLE END DIASTOLIC VOLUME APICAL 4 CHAMBER: 77.3 ML
LEFT VENTRICLE END SYSTOLIC VOLUME APICAL 2 CHAMBER: 49.2 ML
LEFT VENTRICLE END SYSTOLIC VOLUME APICAL 4 CHAMBER: 44.6 ML
LEFT VENTRICLE MASS INDEX: 109 G/M2
LEFT VENTRICLE SYSTOLIC VOLUME INDEX: 27.9 ML/M2
LEFT VENTRICLE SYSTOLIC VOLUME: 53 ML
LEFT VENTRICULAR INTERNAL DIMENSION IN DIASTOLE: 5 CM (ref 3.5–6)
LEFT VENTRICULAR MASS: 207.1 G
LV LATERAL E/E' RATIO: 9.3 M/S
LV SEPTAL E/E' RATIO: 10.5 M/S
LVED V (TEICH): 119 ML
LVES V (TEICH): 53.3 ML
LVOT MG: 2 MMHG
LVOT MV: 0.66 CM/S
MV MEAN GRADIENT: 2 MMHG
MV PEAK A VEL: 1 M/S
MV PEAK E VEL: 0.84 M/S
MV PEAK GRADIENT: 5 MMHG
MV VALVE AREA BY CONTINUITY EQUATION: 2.94 CM2
NUC REST DIASTOLIC VOLUME INDEX: 83
NUC REST EJECTION FRACTION: 71
NUC REST SYSTOLIC VOLUME INDEX: 24
NUC STRESS DIASTOLIC VOLUME INDEX: 79
NUC STRESS EJECTION FRACTION: 70 %
NUC STRESS SYSTOLIC VOLUME INDEX: 24
OHS CV CPX 1 MINUTE RECOVERY HEART RATE: 85 BPM
OHS CV CPX 85 PERCENT MAX PREDICTED HEART RATE MALE: 130
OHS CV CPX MAX PREDICTED HEART RATE: 153
OHS CV CPX PATIENT IS FEMALE: 0
OHS CV CPX PATIENT IS MALE: 1
OHS CV CPX PEAK DIASTOLIC BLOOD PRESSURE: 68 MMHG
OHS CV CPX PEAK HEAR RATE: 85 BPM
OHS CV CPX PEAK RATE PRESSURE PRODUCT: NORMAL
OHS CV CPX PEAK SYSTOLIC BLOOD PRESSURE: 155 MMHG
OHS CV CPX PERCENT MAX PREDICTED HEART RATE ACHIEVED: 56
OHS CV CPX RATE PRESSURE PRODUCT PRESENTING: NORMAL
OHS CV INITIAL DOSE: 12.6 MCG/KG/MIN
OHS CV PEAK DOSE: 25.8 MCG/KG/MIN
OHS CV RV/LV RATIO: 0.38 CM
OHS LV EJECTION FRACTION SIMPSONS BIPLANE MOD: 51 %
PV MV: 0.73 M/S
PV PEAK GRADIENT: 5 MMHG
PV PEAK VELOCITY: 1.07 M/S
RA PRESSURE ESTIMATED: 3 MMHG
RETIRED EF AND QEF - SEE NOTES: 53 %
RIGHT VENTRICLE DIASTOLIC BASEL DIMENSION: 1.9 CM
RIGHT VENTRICULAR END-DIASTOLIC DIMENSION: 1.94 CM
RV TISSUE DOPPLER FREE WALL SYSTOLIC VELOCITY 1 (APICAL 4 CHAMBER VIEW): 13.8 CM/S
SYSTOLIC BLOOD PRESSURE: 158 MMHG
TDI LATERAL: 0.09 M/S
TDI SEPTAL: 0.08 M/S
TDI: 0.09 M/S
TRICUSPID ANNULAR PLANE SYSTOLIC EXCURSION: 2.33 CM
Z-SCORE OF LEFT VENTRICULAR DIMENSION IN END DIASTOLE: -0.64
Z-SCORE OF LEFT VENTRICULAR DIMENSION IN END SYSTOLE: 0.72

## 2025-02-25 PROCEDURE — A9502 TC99M TETROFOSMIN: HCPCS | Performed by: INTERNAL MEDICINE

## 2025-02-25 PROCEDURE — 93018 CV STRESS TEST I&R ONLY: CPT | Mod: ,,, | Performed by: INTERNAL MEDICINE

## 2025-02-25 PROCEDURE — 78452 HT MUSCLE IMAGE SPECT MULT: CPT | Mod: 26,,, | Performed by: INTERNAL MEDICINE

## 2025-02-25 PROCEDURE — 78452 HT MUSCLE IMAGE SPECT MULT: CPT

## 2025-02-25 PROCEDURE — 93306 TTE W/DOPPLER COMPLETE: CPT | Mod: 26,,, | Performed by: INTERNAL MEDICINE

## 2025-02-25 PROCEDURE — 93306 TTE W/DOPPLER COMPLETE: CPT

## 2025-02-25 PROCEDURE — 63600175 PHARM REV CODE 636 W HCPCS: Performed by: INTERNAL MEDICINE

## 2025-02-25 PROCEDURE — 93016 CV STRESS TEST SUPVJ ONLY: CPT | Mod: ,,, | Performed by: INTERNAL MEDICINE

## 2025-02-25 RX ORDER — REGADENOSON 0.08 MG/ML
0.4 INJECTION, SOLUTION INTRAVENOUS ONCE
Status: COMPLETED | OUTPATIENT
Start: 2025-02-25 | End: 2025-02-25

## 2025-02-25 RX ADMIN — TETROFOSMIN 12.6 MILLICURIE: 1.38 INJECTION, POWDER, LYOPHILIZED, FOR SOLUTION INTRAVENOUS at 06:02

## 2025-02-25 RX ADMIN — REGADENOSON 0.4 MG: 0.08 INJECTION, SOLUTION INTRAVENOUS at 08:02

## 2025-02-25 RX ADMIN — TETROFOSMIN 25.8 MILLICURIE: 1.38 INJECTION, POWDER, LYOPHILIZED, FOR SOLUTION INTRAVENOUS at 08:02

## 2025-02-26 ENCOUNTER — HOSPITAL ENCOUNTER (OUTPATIENT)
Dept: RADIOLOGY | Facility: HOSPITAL | Age: 65
Discharge: HOME | End: 2025-02-26
Attending: INTERNAL MEDICINE
Payer: MEDICARE

## 2025-02-26 PROCEDURE — 25500020 PHARM REV CODE 255: Performed by: INTERNAL MEDICINE

## 2025-02-26 PROCEDURE — 75635 CT ANGIO ABDOMINAL ARTERIES: CPT | Mod: TC

## 2025-02-26 PROCEDURE — 75635 CT ANGIO ABDOMINAL ARTERIES: CPT | Mod: 26,,, | Performed by: RADIOLOGY

## 2025-02-26 RX ADMIN — IOHEXOL 100 ML: 350 INJECTION, SOLUTION INTRAVENOUS at 08:02

## 2025-02-28 ENCOUNTER — RESULTS FOLLOW-UP (OUTPATIENT)
Dept: CARDIOLOGY | Facility: CLINIC | Age: 65
End: 2025-02-28

## 2025-03-19 ENCOUNTER — OFFICE VISIT (OUTPATIENT)
Dept: CARDIOLOGY | Facility: CLINIC | Age: 65
End: 2025-03-19
Payer: MEDICARE

## 2025-03-19 VITALS
HEIGHT: 67 IN | RESPIRATION RATE: 17 BRPM | HEART RATE: 89 BPM | SYSTOLIC BLOOD PRESSURE: 128 MMHG | BODY MASS INDEX: 27.15 KG/M2 | WEIGHT: 173 LBS | DIASTOLIC BLOOD PRESSURE: 78 MMHG

## 2025-03-19 DIAGNOSIS — I65.22 STENOSIS OF LEFT CAROTID ARTERY: ICD-10-CM

## 2025-03-19 DIAGNOSIS — I73.9 PERIPHERAL VASCULAR DISEASE, UNSPECIFIED: Primary | ICD-10-CM

## 2025-03-19 DIAGNOSIS — E78.5 DYSLIPIDEMIA: ICD-10-CM

## 2025-03-19 DIAGNOSIS — R07.89 OTHER CHEST PAIN: ICD-10-CM

## 2025-03-19 PROCEDURE — 3008F BODY MASS INDEX DOCD: CPT | Mod: CPTII,S$GLB,, | Performed by: INTERNAL MEDICINE

## 2025-03-19 PROCEDURE — 99214 OFFICE O/P EST MOD 30 MIN: CPT | Mod: S$GLB,,, | Performed by: INTERNAL MEDICINE

## 2025-03-19 PROCEDURE — 3078F DIAST BP <80 MM HG: CPT | Mod: CPTII,S$GLB,, | Performed by: INTERNAL MEDICINE

## 2025-03-19 PROCEDURE — 3074F SYST BP LT 130 MM HG: CPT | Mod: CPTII,S$GLB,, | Performed by: INTERNAL MEDICINE

## 2025-03-19 PROCEDURE — 1159F MED LIST DOCD IN RCRD: CPT | Mod: CPTII,S$GLB,, | Performed by: INTERNAL MEDICINE

## 2025-03-19 PROCEDURE — 3288F FALL RISK ASSESSMENT DOCD: CPT | Mod: CPTII,S$GLB,, | Performed by: INTERNAL MEDICINE

## 2025-03-19 PROCEDURE — 1101F PT FALLS ASSESS-DOCD LE1/YR: CPT | Mod: CPTII,S$GLB,, | Performed by: INTERNAL MEDICINE

## 2025-03-19 PROCEDURE — 4010F ACE/ARB THERAPY RXD/TAKEN: CPT | Mod: CPTII,S$GLB,, | Performed by: INTERNAL MEDICINE

## 2025-03-19 PROCEDURE — 1160F RVW MEDS BY RX/DR IN RCRD: CPT | Mod: CPTII,S$GLB,, | Performed by: INTERNAL MEDICINE

## 2025-03-19 PROCEDURE — 99999 PR PBB SHADOW E&M-EST. PATIENT-LVL IV: CPT | Mod: PBBFAC,,, | Performed by: INTERNAL MEDICINE

## 2025-03-19 RX ORDER — CLOPIDOGREL BISULFATE 75 MG/1
75 TABLET ORAL DAILY
Qty: 90 TABLET | Refills: 3 | Status: SHIPPED | OUTPATIENT
Start: 2025-03-19 | End: 2026-03-19

## 2025-03-19 RX ORDER — ATORVASTATIN CALCIUM 20 MG/1
20 TABLET, FILM COATED ORAL NIGHTLY
Qty: 90 TABLET | Refills: 3 | Status: SHIPPED | OUTPATIENT
Start: 2025-03-19 | End: 2026-03-19

## 2025-03-19 NOTE — ASSESSMENT & PLAN NOTE
History of carotid artery disease also recommend to maintain on antiplatelet therapy and statin therapy as well as referred to vascular

## 2025-03-19 NOTE — ASSESSMENT & PLAN NOTE
Referred to vascular surgery for further evaluation. Will add Plavix to his regimen at 75 mg daily

## 2025-03-19 NOTE — ASSESSMENT & PLAN NOTE
Myocardial perfusion study did not show any evidence of reversible ischemia.  Continue on risk factor modification tobacco cessation blood pressure control and statin therapy as tolerated

## 2025-03-19 NOTE — ASSESSMENT & PLAN NOTE
Recommend to add Lipitor 20 mg daily this has could be titrated upwards as needed.  The goal is to get his LDL cholesterol 60 or less to have extensive atherosclerotic cardiovascular disease

## 2025-03-19 NOTE — PROGRESS NOTES
Subjective:    Patient ID:  Darien Le is a 65 y.o. male patient here for evaluation Follow-up (Doing well)      History of Present Illness:   Is a 65-year-old gentleman with history of COPD tobacco usage arterial hypertension, dyslipidemia, has been experiencing recurrent episodes of claudication symptoms both lower extremities.  He has completed a CTA of the abdominal aorta runoff in both lower extremities.  He is noted to have extensive arteriosclerotic disease.    He is also having some paresthesias left upper extremity he had cervical surgery about 19 years ago  Atypical chest pains  noted.          Review of patient's allergies indicates:   Allergen Reactions    Umeclidinium bromide Swelling    Codeine Nausea And Vomiting       Past Medical History:   Diagnosis Date    Allergy     Arthritis     COPD (chronic obstructive pulmonary disease)     Digestive disorder     Hypertension      Past Surgical History:   Procedure Laterality Date    COLONOSCOPY N/A 8/27/2018    Procedure: COLONOSCOPY;  Surgeon: Reji Dsouza MD;  Location: [facility] ENDO;  Service: Endoscopy;  Laterality: N/A;  WITH POLYPECTOMY X 2    COLONOSCOPY N/A 3/10/2023    Procedure: COLONOSCOPY;  Surgeon: Reji Dsouza MD;  Location: [facility] ENDO;  Service: General;  Laterality: N/A;    ESOPHAGOGASTRODUODENOSCOPY N/A 8/27/2018    Procedure: ESOPHAGOGASTRODUODENOSCOPY (EGD);  Surgeon: Reji Dsouza MD;  Location: [facility] ENDO;  Service: Endoscopy;  Laterality: N/A;  WITH BX    ESOPHAGOGASTRODUODENOSCOPY N/A 3/10/2023    Procedure: ESOPHAGOGASTRODUODENOSCOPY (EGD);  Surgeon: Reji Dsouza MD;  Location: [facility] ENDO;  Service: General;  Laterality: N/A;    EXCISION OF SOFT TISSUE N/A 10/22/2021    Procedure: EXCISION, SOFT TISSUE;  Surgeon: Reji Dsouza MD;  Location: [facility] OR;  Service: General;  Laterality: N/A;    FUNCTIONAL ENDOSCOPIC SINUS SURGERY (FESS) N/A 3/11/2022    Procedure: FESS (FUNCTIONAL ENDOSCOPIC  SINUS SURGERY);  Surgeon: Hood Obando MD;  Location: [facility] OR;  Service: ENT;  Laterality: N/A;    MYRINGOTOMY WITH INSERTION OF VENTILATION TUBE Bilateral 3/11/2022    Procedure: MYRINGOTOMY, WITH TYMPANOSTOMY TUBE INSERTION;  Surgeon: Hood Obando MD;  Location: [facility] OR;  Service: ENT;  Laterality: Bilateral;    NASAL SEPTOPLASTY N/A 3/11/2022    Procedure: SEPTOPLASTY, NOSE;  Surgeon: Hood Obando MD;  Location: [facility] OR;  Service: ENT;  Laterality: N/A;    NASAL TURBINATE REDUCTION Bilateral 3/11/2022    Procedure: REDUCTION, NASAL TURBINATE;  Surgeon: Hood Obando MD;  Location: [facility] OR;  Service: ENT;  Laterality: Bilateral;    NECK SURGERY       Social History[1]     Review of Systems:    As noted in HPI in addition      REVIEW OF SYSTEMS  CARDIOVASCULAR: No recent chest pain, palpitations, arm, neck, or jaw pain  RESPIRATORY: No recent fever, cough and congestion noted   : No blood in the urine  GI: No Nausea, vomiting, constipation, diarrhea, blood, or reflux.  MUSCULOSKELETAL: No myalgias  NEURO: No lightheadedness or dizziness  EYES: No Double vision, blurry, vision or headache              Objective        Vitals:    03/19/25 1327   BP: 128/78   Pulse: 89   Resp: 17       LIPIDS - LAST 2   Lab Results   Component Value Date    CHOL 172 09/19/2023    HDL 26 (L) 09/19/2023    LDLCALC 119.4 09/19/2023    TRIG 133 09/19/2023    CHOLHDL 15.1 (L) 09/19/2023       CBC - LAST 2  Lab Results   Component Value Date    WBC 9.15 09/19/2023    WBC 12.82 (H) 04/11/2023    RBC 5.26 09/19/2023    RBC 5.49 04/11/2023    HGB 16.1 09/19/2023    HGB 17.0 04/11/2023    HCT 45.1 09/19/2023    HCT 47.3 04/11/2023    MCV 86 09/19/2023    MCV 86 04/11/2023    MCH 30.6 09/19/2023    MCH 31.0 04/11/2023    MCHC 35.7 09/19/2023    MCHC 35.9 04/11/2023    RDW 13.2 09/19/2023    RDW 13.0 04/11/2023     09/19/2023     04/11/2023    MPV 10.4 09/19/2023    MPV 10.1 04/11/2023    GRAN 6.0 09/19/2023  "   GRAN 65.7 09/19/2023    LYMPH 1.9 09/19/2023    LYMPH 20.3 09/19/2023    MONO 0.9 09/19/2023    MONO 9.4 09/19/2023    BASO 0.06 09/19/2023    BASO 0.07 10/20/2021    NRBC 0 09/19/2023    NRBC 0 10/20/2021       CHEMISTRY & LIVER FUNCTION - LAST 2  Lab Results   Component Value Date     02/26/2025     09/19/2023    K 3.5 02/26/2025    K 3.8 09/19/2023     02/26/2025     09/19/2023    CO2 17 (L) 02/26/2025    CO2 22 (L) 09/19/2023    ANIONGAP 17 (H) 02/26/2025    ANIONGAP 12 09/19/2023    BUN 11 02/26/2025    BUN 15 09/19/2023    CREATININE 0.7 02/26/2025    CREATININE 1.0 09/19/2023     02/26/2025     (H) 09/19/2023    CALCIUM 9.6 02/26/2025    CALCIUM 9.1 09/19/2023    ALBUMIN 3.9 02/26/2025    ALBUMIN 4.0 09/19/2023    PROT 6.8 02/26/2025    PROT 6.5 09/19/2023    ALKPHOS 57 02/26/2025    ALKPHOS 62 09/19/2023    ALT 10 02/26/2025    ALT 20 09/19/2023    AST 12 02/26/2025    AST 16 09/19/2023    BILITOT 0.4 02/26/2025    BILITOT 0.9 09/19/2023        CARDIAC PROFILE - LAST 2  No results found for: "BNP", "CPK", "CPKMB", "LDH", "TROPONINI", "TROPONINIHS"     COAGULATION - LAST 2  No results found for: "LABPT", "INR", "APTT"    ENDOCRINE & PSA - LAST 2  Lab Results   Component Value Date    HGBA1C 4.8 09/19/2023    PSA 0.46 09/19/2023        ECHOCARDIOGRAM RESULTS  Results for orders placed during the hospital encounter of 02/25/25    Echo    Interpretation Summary    Left Ventricle: The left ventricle is normal in size. Normal wall thickness. There is concentric remodeling. Mild global hypokinesis present. There is low normal systolic function with a visually estimated ejection fraction of 50 - 55%. There is normal diastolic function.    Right Ventricle: The right ventricle is normal in size. Wall thickness is normal. Systolic function is normal.    Aortic Valve: There is moderate aortic valve sclerosis.    IVC/SVC: Normal venous pressure at 3 mmHg.      CURRENT/PREVIOUS " VISIT EKG  Results for orders placed or performed in visit on 02/17/25   IN OFFICE EKG 12-LEAD (to [place])    Collection Time: 02/17/25  9:08 AM   Result Value Ref Range    QRS Duration 104 ms    [facility] QTC Calculation 431 ms    Narrative    Test Reason : I73.9,R07.89,I10,E78.5,I65.22,    Vent. Rate :  67 BPM     Atrial Rate :  67 BPM     P-R Int : 168 ms          QRS Dur : 104 ms      QT Int : 408 ms       P-R-T Axes :  72 -17  71 degrees    QTcB Int : 431 ms    Normal sinus rhythm  Normal ECG  When compared with ECG of 18-Apr-2024 19:52,  No significant change was found  Confirmed by Rojelio Pedroza ([id number]) on 2/26/2025 10:30:27 PM    Referred By: AAAREFERRAL SELF           Confirmed By: Rojelio Pedroza     No valid procedures specified.   Results for orders placed during the hospital encounter of 02/25/25    Nuclear Stress - Cardiology Interpreted    Interpretation Summary    Abnormal myocardial perfusion scan.  This has no evidence of any reversible ischemia seen    There is a moderate to severe intensity, medium sized, equivocal perfusion abnormality that is fixed in the basal to apical inferior and inferoseptal wall(s). This finding is equivocal due to diaphragm shadow.    There are no other significant perfusion abnormalities.    There is a moderate intensity fixed perfusion abnormality in the  wall of the left ventricle secondary to diaphragm attenuation.    The gated perfusion images showed an ejection fraction of 71% at rest. The gated perfusion images showed an ejection fraction of 70% post stress. Normal ejection fraction is greater than 53%.    There is normal wall motion at rest and post-stress.    LV cavity size is normal at rest and normal at post-stress.    The ECG portion of the study is negative for ischemia.    The patient reported no chest pain during the stress test.    There were no arrhythmias during stress.      CTA of the abdominal aorta and runoff  Impression:     Extensive atherosclerosis.   Stenoses at the origin of the SMA, right renal artery, distal left common iliac artery, left common femoral artery, origin of left SFA, and throughout the bilateral anterior tibial arteries.  Previous stent placement in the iliac arteries and graft placement right CFA/proximal SFA.     Incidental findings in the abdomen/pelvis outlined above.        Electronically signed by:Nely Mobley  Date:                                            02/26/2025  Time:                                           10:17        Exam Ended: 02/26/25 08:30 CST Last Resulted: 02/26/25 10:17 CST           PHYSICAL EXAM  CONSTITUTIONAL: Well built, well nourished in no apparent distress  NECK: no carotid bruit, no JVD  LUNGS:  Bilateral coarse crackles and scattered end expiratory wheezes   CHEST WALL: no tenderness  HEART: regular rate and rhythm, S1, S2 normal, soft systolic murmur   ABDOMEN: soft, non-tender; bowel sounds normal; no masses,  no organomegaly  EXTREMITIES: Extremities normal, no edema, no calf tenderness noted  NEURO: AAO X 3    I HAVE REVIEWED :    The vital signs, nurses notes, and all the pertinent radiology and labs.        Current Outpatient Medications   Medication Instructions    albuterol (PROVENTIL/VENTOLIN HFA) 90 mcg/actuation inhaler 2 puffs, Inhalation, Every 4 hours PRN    albuterol sulfate 2.5 mg, Nebulization, Every 4 hours PRN    amLODIPine (NORVASC) 10 mg    aspirin (ECOTRIN) 81 mg    atenoloL (TENORMIN) 100 mg    atorvastatin (LIPITOR) 20 mg, Oral, Nightly    benzonatate (TESSALON) 100 mg    budesonide-formoterol 160-4.5 mcg (SYMBICORT) 160-4.5 mcg/actuation HFAA 2 puffs    busPIRone (BUSPAR) 5 mg, Oral, 2 times daily    clopidogreL (PLAVIX) 75 mg, Oral, Daily    diclofenac sodium (VOLTAREN) 1 % Gel Apply topically.    fluticasone (FLONASE) 50 mcg/actuation nasal spray 1 spray    fluticasone-umeclidin-vilanter (TRELEGY ELLIPTA) 100-62.5-25 mcg DsDv INHALE 1 PUFF INTO LUNGS ONCE DAILY AT THE SAME  TIME EVERY DAY    hydrALAZINE (APRESOLINE) 25 mg    lisinopriL (PRINIVIL,ZESTRIL) 20 mg    loratadine (CLARITIN) 10 mg tablet TAKE 1 TABLET BY MOUTH ONCE DAILY AS NEEDED    montelukast (SINGULAIR) 10 mg    pantoprazole (PROTONIX) 40 mg, Oral, Daily    sucralfate (CARAFATE) 1 g, Oral, 4 times daily    tiotropium (SPIRIVA) 18 mcg, Inhalation    triamcinolone acetonide 0.1% (KENALOG) 0.1 % cream SMARTSIG:sparingly Topical Twice Daily          Assessment & Plan     1. Peripheral vascular disease, unspecified  Assessment & Plan:  Referred to vascular surgery for further evaluation. Will add Plavix to his regimen at 75 mg daily    Orders:  -     Lipid Panel; Future; Expected date: 03/19/2025  -     Hepatic Function Panel; Future; Expected date: 03/19/2025    2. Other chest pain  Assessment & Plan:  Myocardial perfusion study did not show any evidence of reversible ischemia.  Continue on risk factor modification tobacco cessation blood pressure control and statin therapy as tolerated    Orders:  -     Lipid Panel; Future; Expected date: 03/19/2025  -     Hepatic Function Panel; Future; Expected date: 03/19/2025    3. Dyslipidemia  Assessment & Plan:  Recommend to add Lipitor 20 mg daily this has could be titrated upwards as needed.  The goal is to get his LDL cholesterol 60 or less to have extensive atherosclerotic cardiovascular disease    Orders:  -     Lipid Panel; Future; Expected date: 03/19/2025  -     Hepatic Function Panel; Future; Expected date: 03/19/2025    4. Stenosis of left carotid artery  Assessment & Plan:  History of carotid artery disease also recommend to maintain on antiplatelet therapy and statin therapy as well as referred to vascular      Other orders  -     clopidogreL (PLAVIX) 75 mg tablet; Take 1 tablet (75 mg total) by mouth once daily.  Dispense: 90 tablet; Refill: 3  -     atorvastatin (LIPITOR) 20 MG tablet; Take 1 tablet (20 mg total) by mouth every evening.  Dispense: 90 tablet; Refill:  3          No follow-ups on file.            [1]   Social History  Tobacco Use    Smoking status: Every Day     Current packs/day: 2.00     Average packs/day: 2.0 packs/day for 50.0 years (100.0 ttl pk-yrs)     Types: Cigarettes    Smokeless tobacco: Never   Substance Use Topics    Alcohol use: Yes    Drug use: No

## 2025-03-27 NOTE — H&P (VIEW-ONLY)
VASCULAR SURGERY CLINIC NOTE         Referring Provider:      No ref. provider found     Chief Complaint:     Chief Complaint   Patient presents with    Referral     Ref: Kasia;        HPI:     Darien Le is a 65 y.o. male history of bilateral lower extremity claudication.  Possible history of kissing iliac stents.  CTA of the neck in 2000 23 showed less than 50% stenosis on the right and patent left carotid endarterectomy site.  Has a history of a right groin incision which may be consistent with a femoral endarterectomy.  CTA reviewed.  Of his aorta with runoff and appears to have some distal common iliac stenosis right at the bifurcation bilaterally.  Possibly associated with InStent stenosis.  Has a history of smoking about a pack and half a day.      Medication List:        Current Outpatient Medications:     atorvastatin (LIPITOR) 20 MG tablet, Take by mouth daily, Disp: , Rfl:     Past Medical History:     Past Medical History:   Diagnosis Date    Arthritis     Chronic obstructive pulmonary disease (CMS/HCC)     HTN (hypertension)        Past Surgical History:     Past Surgical History:   Procedure Laterality Date    COLONOSCOPY      EXCISION OF SOFT TISSUE       NASAL SEPTUM SURGERY      NECK SURGERY  2018       ROS:     Review of Systems   Constitutional:  Negative for chills and fever.   Respiratory:  Negative for shortness of breath.    Cardiovascular:  Negative for chest pain.   Gastrointestinal:  Negative for abdominal pain.        Neg for post prandial pain or weight loss   Musculoskeletal:  Negative for back pain.        Bilateral claudication   Neurological:  Negative for speech difficulty.        Neg for amaurosis or TIA          Allergies:      Allergies   Allergen Reactions    Umeclidinium Bromide Swelling    Codeine Nausea And Vomiting and Other (See Comments)       Social History:     Social History     Socioeconomic History    Marital status:      Spouse name: Not on  file    Number of children: Not on file    Years of education: Not on file    Highest education level: Not on file   Occupational History    Not on file   Tobacco Use    Smoking status: Not on file    Smokeless tobacco: Not on file   Substance and Sexual Activity    Alcohol use: Not on file    Drug use: Not on file    Sexual activity: Not on file   Other Topics Concern    Not on file   Social History Narrative    Not on file     Social Determinants of Health     Financial Resource Strain: Low Risk  (2/16/2025)    [facility]    Overall Financial Resource Strain (CARDIA)     Difficulty of Paying Living Expenses: Not very hard   Food Insecurity: No Food Insecurity (2/16/2025)    Received from [facility] and Affiliates    Hunger Vital Sign     Worried About Running Out of Food in the Last Year: Never true     Ran Out of Food in the Last Year: Never true   Transportation Needs: No Transportation Needs (2/16/2025)    Received from [facility] and [facility]ates    PRAPARE - Transportation     Lack of Transportation (Medical): No     Lack of Transportation (Non-Medical): No   Physical Activity: Insufficiently Active (2/16/2025)    Received from [facility] and [facility]ates    Exercise Vital Sign     Days of Exercise per Week: 7 days     Minutes of Exercise per Session: 10 min   Stress: Stress Concern Present (2/16/2025)    Received from [facility] and [facility]    [employer] - Occupational Stress Questionnaire     Feeling of Stress : To some extent   Social Connections: Not on file   Intimate Partner Violence: Not on file   Housing Stability: Low Risk  (2/16/2025)    Received from [facility], [facility]  "   Housing Stability Vital Sign     Unable to Pay for Housing in the Last Year: No     Number of Times Moved in the Last Year: 0     Homeless in the Last Year: No       Family History:     Family History   Problem Relation Age of Onset    Heart disease Mother     Cancer Father        Physical Exam:     Vitals:    03/27/25 0934   BP: (!) 154/82   Pulse: 70   Resp: 19   SpO2: 95%   Weight: 78.5 kg (173 lb)   Height: 1.702 m (5' 7")      Weight: 78.5 kg (173 lb)   Body mass index is 27.1 kg/m².    Physical Exam  Constitutional:       Appearance: Normal appearance.   Neck:      Vascular: No carotid bruit.      Comments: Well-healed left carotid incision  Cardiovascular:      Rate and Rhythm: Normal rate and regular rhythm.      Pulses:           Radial pulses are 2+ on the right side and 2+ on the left side.        Femoral pulses are 2+ on the right side and 2+ on the left side.       Dorsalis pedis pulses are 2+ on the right side and 2+ on the left side.      Comments: Well-healed right groin incision  Abdominal:      Palpations: Abdomen is soft. There is no mass.      Tenderness: There is no abdominal tenderness.   Neurological:      General: No focal deficit present.      Mental Status: He is alert and oriented to person, place, and time.      Sensory: No sensory deficit.      Motor: No weakness.            Laboratory   LABS    CBC  Lab Results   Component Value Date    WBC 9.15 09/19/2023    HGB 16.1 09/19/2023    HEMATOCRIT 45.1 09/19/2023    MCV 86 09/19/2023    MCH 30.6 09/19/2023    MCHC 35.7 09/19/2023    RDW 13.2 09/19/2023     09/19/2023    MPV 10.4 09/19/2023    MONOABS 0.9 09/19/2023    BASOPCT 0.7 09/19/2023       BMP/CMP  Lab Results   Component Value Date     02/26/2025    K 3.5 02/26/2025     02/26/2025    CO2 17 (L) 02/26/2025     02/26/2025    BUN 11 02/26/2025    CREATININE 0.7 02/26/2025    CALCIUM 9.6 02/26/2025    PROT 6.8 02/26/2025    ALBUMIN 3.9 02/26/2025    " "BILIRUBIN 0.4 02/26/2025    AST 12 02/26/2025    ALKPHOS 57 02/26/2025    ALT 10 02/26/2025    GFRAA >60.0 02/14/2022         Problem List:     There are no problems to display for this patient.      Medications and Orders:       There are no diagnoses linked to this encounter.       Assessment and Plan:       Darien Nino" was seen today for referral.    Diagnoses and all orders for this visit:    Claudication in peripheral vascular disease (CMS/[facility])    Stenosis of left carotid artery  -     Carotid Duplex Bilateral US (COMPLETE) (Cardiology/Vascular Read); Future    Smoking      Carotid ultrasound positive for recurrent stenosis on the left.  Schedule aortogram with runoff to better evaluate iliac stenosis.  If symptoms do not resolve after iliac intervention then may want to consider lumbar disc disease.  We will also do left carotid angiogram          John Ewing MD  9:38 AM  3/27/2025                 "

## 2025-03-27 NOTE — H&P (VIEW-ONLY)
VASCULAR SURGERY CLINIC NOTE         Referring Provider:      No ref. provider found     Chief Complaint:     Chief Complaint   Patient presents with    Referral     Ref: Kasia;        HPI:     Darien Le is a 65 y.o. male history of bilateral lower extremity claudication.  Possible history of kissing iliac stents.  CTA of the neck in 2000 23 showed less than 50% stenosis on the right and patent left carotid endarterectomy site.  Has a history of a right groin incision which may be consistent with a femoral endarterectomy.  CTA reviewed.  Of his aorta with runoff and appears to have some distal common iliac stenosis right at the bifurcation bilaterally.  Possibly associated with InStent stenosis.  Has a history of smoking about a pack and half a day.      Medication List:        Current Outpatient Medications:     atorvastatin (LIPITOR) 20 MG tablet, Take by mouth daily, Disp: , Rfl:     Past Medical History:     Past Medical History:   Diagnosis Date    Arthritis     Chronic obstructive pulmonary disease (CMS/HCC)     HTN (hypertension)        Past Surgical History:     Past Surgical History:   Procedure Laterality Date    COLONOSCOPY      EXCISION OF SOFT TISSUE       NASAL SEPTUM SURGERY      NECK SURGERY  2018       ROS:     Review of Systems   Constitutional:  Negative for chills and fever.   Respiratory:  Negative for shortness of breath.    Cardiovascular:  Negative for chest pain.   Gastrointestinal:  Negative for abdominal pain.        Neg for post prandial pain or weight loss   Musculoskeletal:  Negative for back pain.        Bilateral claudication   Neurological:  Negative for speech difficulty.        Neg for amaurosis or TIA          Allergies:      Allergies   Allergen Reactions    Umeclidinium Bromide Swelling    Codeine Nausea And Vomiting and Other (See Comments)       Social History:     Social History     Socioeconomic History    Marital status:      Spouse name: Not on  file    Number of children: Not on file    Years of education: Not on file    Highest education level: Not on file   Occupational History    Not on file   Tobacco Use    Smoking status: Not on file    Smokeless tobacco: Not on file   Substance and Sexual Activity    Alcohol use: Not on file    Drug use: Not on file    Sexual activity: Not on file   Other Topics Concern    Not on file   Social History Narrative    Not on file     Social Determinants of Health     Financial Resource Strain: Low Risk  (2/16/2025)    [facility]    Overall Financial Resource Strain (CARDIA)     Difficulty of Paying Living Expenses: Not very hard   Food Insecurity: No Food Insecurity (2/16/2025)    Received from [facility] and [facility]ates    Hunger Vital Sign     Worried About Running Out of Food in the Last Year: Never true     Ran Out of Food in the Last Year: Never true   Transportation Needs: No Transportation Needs (2/16/2025)    Received from [facility] and [facility]    PRAPARE - Transportation     Lack of Transportation (Medical): No     Lack of Transportation (Non-Medical): No   Physical Activity: Insufficiently Active (2/16/2025)    Received from [facility] and [facility]ates    Exercise Vital Sign     Days of Exercise per Week: 7 days     Minutes of Exercise per Session: 10 min   Stress: Stress Concern Present (2/16/2025)    Received from [facility] and [facility]ates    [place] [place] [employer] - Occupational Stress Questionnaire     Feeling of Stress : To some extent   Social Connections: Not on file   Intimate Partner Violence: Not on file   Housing Stability: Low Risk  (2/16/2025)    [facility] from [facility], [facility]  "   Housing Stability Vital Sign     Unable to Pay for Housing in the Last Year: No     Number of Times Moved in the Last Year: 0     Homeless in the Last Year: No       Family History:     Family History   Problem Relation Age of Onset    Heart disease Mother     Cancer Father        Physical Exam:     Vitals:    03/27/25 0934   BP: (!) 154/82   Pulse: 70   Resp: 19   SpO2: 95%   Weight: 78.5 kg (173 lb)   Height: 1.702 m (5' 7")      Weight: 78.5 kg (173 lb)   Body mass index is 27.1 kg/m².    Physical Exam  Constitutional:       Appearance: Normal appearance.   Neck:      Vascular: No carotid bruit.      Comments: Well-healed left carotid incision  Cardiovascular:      Rate and Rhythm: Normal rate and regular rhythm.      Pulses:           Radial pulses are 2+ on the right side and 2+ on the left side.        Femoral pulses are 2+ on the right side and 2+ on the left side.       Dorsalis pedis pulses are 2+ on the right side and 2+ on the left side.      Comments: Well-healed right groin incision  Abdominal:      Palpations: Abdomen is soft. There is no mass.      Tenderness: There is no abdominal tenderness.   Neurological:      General: No focal deficit present.      Mental Status: He is alert and oriented to person, place, and time.      Sensory: No sensory deficit.      Motor: No weakness.            Laboratory   LABS    CBC  Lab Results   Component Value Date    WBC 9.15 09/19/2023    HGB 16.1 09/19/2023    HEMATOCRIT 45.1 09/19/2023    MCV 86 09/19/2023    MCH 30.6 09/19/2023    MCHC 35.7 09/19/2023    RDW 13.2 09/19/2023     09/19/2023    MPV 10.4 09/19/2023    MONOABS 0.9 09/19/2023    BASOPCT 0.7 09/19/2023       BMP/CMP  Lab Results   Component Value Date     02/26/2025    K 3.5 02/26/2025     02/26/2025    CO2 17 (L) 02/26/2025     02/26/2025    BUN 11 02/26/2025    CREATININE 0.7 02/26/2025    CALCIUM 9.6 02/26/2025    PROT 6.8 02/26/2025    ALBUMIN 3.9 02/26/2025    " "BILIRUBIN 0.4 02/26/2025    AST 12 02/26/2025    ALKPHOS 57 02/26/2025    ALT 10 02/26/2025    GFRAA >60.0 02/14/2022         Problem List:     There are no problems to display for this patient.      Medications and Orders:       There are no diagnoses linked to this encounter.       Assessment and Plan:       Darien Nino" was seen today for referral.    Diagnoses and all orders for this visit:    Claudication in peripheral vascular disease (CMS/[facility])    Stenosis of left carotid artery  -     Carotid Duplex Bilateral US (COMPLETE) (Cardiology/Vascular Read); Future    Smoking      Carotid ultrasound positive for recurrent stenosis on the left.  Schedule aortogram with runoff to better evaluate iliac stenosis.  If symptoms do not resolve after iliac intervention then may want to consider lumbar disc disease.  We will also do left carotid angiogram          John Ewing MD  9:38 AM  3/27/2025                 "

## 2025-03-31 ENCOUNTER — HOSPITAL ENCOUNTER (OUTPATIENT)
Dept: PREADMISSION TESTING | Facility: HOSPITAL | Age: 65
Discharge: HOME | End: 2025-03-31
Attending: SURGERY
Payer: MEDICARE

## 2025-03-31 ENCOUNTER — HOSPITAL ENCOUNTER (OUTPATIENT)
Dept: RADIOLOGY | Facility: HOSPITAL | Age: 65
Discharge: HOME | End: 2025-03-31
Attending: SURGERY
Payer: MEDICARE

## 2025-03-31 VITALS
RESPIRATION RATE: 18 BRPM | WEIGHT: 173.06 LBS | HEART RATE: 64 BPM | HEIGHT: 67 IN | BODY MASS INDEX: 27.16 KG/M2 | TEMPERATURE: 98 F | OXYGEN SATURATION: 97 % | DIASTOLIC BLOOD PRESSURE: 70 MMHG | SYSTOLIC BLOOD PRESSURE: 138 MMHG

## 2025-03-31 DIAGNOSIS — Z01.811 PRE-OP CHEST EXAM: Primary | ICD-10-CM

## 2025-03-31 DIAGNOSIS — Z01.818 PRE-OP TESTING: Primary | ICD-10-CM

## 2025-03-31 DIAGNOSIS — Z01.811 PRE-OP CHEST EXAM: ICD-10-CM

## 2025-03-31 DIAGNOSIS — Z79.01 ADMISSION FOR LONG-TERM (CURRENT) USE OF ANTICOAGULANTS: ICD-10-CM

## 2025-03-31 DIAGNOSIS — Z51.81 ADMISSION FOR LONG-TERM (CURRENT) USE OF ANTICOAGULANTS: ICD-10-CM

## 2025-03-31 LAB
OHS QRS DURATION: 102 MS
OHS QTC CALCULATION: 430 MS

## 2025-03-31 PROCEDURE — 93010 ELECTROCARDIOGRAM REPORT: CPT | Mod: ,,, | Performed by: GENERAL PRACTICE

## 2025-03-31 PROCEDURE — 71046 X-RAY EXAM CHEST 2 VIEWS: CPT | Mod: TC

## 2025-03-31 PROCEDURE — 71046 X-RAY EXAM CHEST 2 VIEWS: CPT | Mod: 26,,, | Performed by: RADIOLOGY

## 2025-03-31 PROCEDURE — 93005 ELECTROCARDIOGRAM TRACING: CPT | Performed by: GENERAL PRACTICE

## 2025-03-31 NOTE — DISCHARGE INSTRUCTIONS
Your procedure is scheduled for:April 2           [facility] at:0730    Nothing to eat  after midnight the night before your procedure. You may have clear liquids up to 2 hours before coming in for procedure. This includes water, Gatorade, clear juice  Do not take any medications the morning of your procedure  Bring all your medications with you in the original pill bottles from pharmacy. Do your chlorhexidine wash the night before and morning of your procedure.  If you use a CPAP or BiPAP at home, please bring it with you the day of your procedure.  Make arrangements for someone you know to drive you home after your procedure. Taxi and Uber are not acceptable.  Come dressed comfortably          Any questions call the [facility] at [phone]

## 2025-04-02 ENCOUNTER — HOSPITAL ENCOUNTER (OUTPATIENT)
Facility: HOSPITAL | Age: 65
Discharge: HOME | End: 2025-04-02
Attending: SURGERY | Admitting: SURGERY
Payer: MEDICARE

## 2025-04-02 VITALS
HEART RATE: 73 BPM | SYSTOLIC BLOOD PRESSURE: 159 MMHG | TEMPERATURE: 98 F | BODY MASS INDEX: 27.16 KG/M2 | OXYGEN SATURATION: 97 % | WEIGHT: 173.06 LBS | HEIGHT: 67 IN | RESPIRATION RATE: 27 BRPM | DIASTOLIC BLOOD PRESSURE: 78 MMHG

## 2025-04-02 DIAGNOSIS — I73.9 CLAUDICATION, INTERMITTENT: ICD-10-CM

## 2025-04-02 PROCEDURE — 25000003 PHARM REV CODE 250: Mod: UD | Performed by: SURGERY

## 2025-04-02 RX ORDER — ROFLUMILAST 500 UG/1
500 TABLET ORAL DAILY PRN
COMMUNITY

## 2025-04-02 RX ORDER — SODIUM CHLORIDE 9 MG/ML
INJECTION, SOLUTION INTRAVENOUS ONCE
Status: COMPLETED | OUTPATIENT
Start: 2025-04-02 | End: 2025-04-02

## 2025-04-02 RX ADMIN — SODIUM CHLORIDE: 9 INJECTION, SOLUTION INTRAVENOUS at 08:04

## 2025-04-02 NOTE — PLAN OF CARE
Ambulated onto unit without difficulty. Complaints of chronic pain but no acute distress noted. Undressed of personal clothing and gown on. Pre-op. Resting in bed with call light in reach. Spouse at bedside.

## 2025-04-02 NOTE — PLAN OF CARE
Patient and spouse states they are wanting to reschedule procedure for later date due to the extensive wait. MD is currently scrubbed into other OR case. Advised patient. Wants to proceed with discharge. Spouse has called MD office already to reschedule. Verbalizes understanding of importance with follow up. Belongings gathered and dressed in personal clothing. Ambulated off unit to private auto accompanied spouse. Notified Dr. Ewing.

## 2025-04-10 ENCOUNTER — HOSPITAL ENCOUNTER (OUTPATIENT)
Facility: HOSPITAL | Age: 65
Discharge: HOME | End: 2025-04-10
Attending: SURGERY | Admitting: SURGERY
Payer: MEDICARE

## 2025-04-10 VITALS
SYSTOLIC BLOOD PRESSURE: 152 MMHG | HEIGHT: 67 IN | WEIGHT: 173 LBS | RESPIRATION RATE: 18 BRPM | OXYGEN SATURATION: 98 % | HEART RATE: 67 BPM | DIASTOLIC BLOOD PRESSURE: 68 MMHG | BODY MASS INDEX: 27.15 KG/M2

## 2025-04-10 DIAGNOSIS — I73.9 CLAUDICATION, INTERMITTENT: Primary | ICD-10-CM

## 2025-04-10 LAB
ABSOLUTE EOSINOPHIL (SMH): 0.26 K/UL
ABSOLUTE MONOCYTE (SMH): 1.08 K/UL (ref 0.3–1)
ABSOLUTE NEUTROPHIL COUNT (SMH): 4.9 K/UL (ref 1.8–7.7)
ANION GAP (SMH): 9 MMOL/L (ref 8–16)
APTT PPP: 27.9 SECONDS (ref 21–32)
BASOPHILS # BLD AUTO: 0.07 K/UL
BASOPHILS NFR BLD AUTO: 0.9 %
BUN SERPL-MCNC: 14 MG/DL (ref 8–23)
CALCIUM SERPL-MCNC: 9.2 MG/DL (ref 8.7–10.5)
CHLORIDE SERPL-SCNC: 107 MMOL/L (ref 95–110)
CO2 SERPL-SCNC: 24 MMOL/L (ref 23–29)
CREAT SERPL-MCNC: 0.8 MG/DL (ref 0.5–1.4)
ERYTHROCYTE [DISTWIDTH] IN BLOOD BY AUTOMATED COUNT: 13.2 % (ref 11.5–14.5)
GFR SERPLBLD CREATININE-BSD FMLA CKD-EPI: >60 ML/MIN/1.73/M2
GLUCOSE SERPL-MCNC: 98 MG/DL (ref 70–110)
HCT VFR BLD AUTO: 40.2 % (ref 40–54)
HGB BLD-MCNC: 13.8 GM/DL (ref 14–18)
IMM GRANULOCYTES # BLD AUTO: 0.04 K/UL (ref 0–0.04)
IMM GRANULOCYTES NFR BLD AUTO: 0.5 % (ref 0–0.5)
INR PPP: 1 (ref 0.8–1.2)
LYMPHOCYTES # BLD AUTO: 1.92 K/UL (ref 1–4.8)
MCH RBC QN AUTO: 29.2 PG (ref 27–31)
MCHC RBC AUTO-ENTMCNC: 34.3 G/DL (ref 32–36)
MCV RBC AUTO: 85 FL (ref 82–98)
NUCLEATED RBC (/100WBC) (SMH): 0 /100 WBC
PLATELET # BLD AUTO: 276 K/UL (ref 150–450)
PMV BLD AUTO: 10.1 FL (ref 9.2–12.9)
POTASSIUM SERPL-SCNC: 3.6 MMOL/L (ref 3.5–5.1)
PROTHROMBIN TIME: 10.9 SECONDS (ref 9–12.5)
RBC # BLD AUTO: 4.73 M/UL (ref 4.6–6.2)
RELATIVE EOSINOPHIL (SMH): 3.2 % (ref 0–8)
RELATIVE LYMPHOCYTE (SMH): 23.3 % (ref 18–48)
RELATIVE MONOCYTE (SMH): 13.1 % (ref 4–15)
RELATIVE NEUTROPHIL (SMH): 59 % (ref 38–73)
SODIUM SERPL-SCNC: 140 MMOL/L (ref 136–145)
WBC # BLD AUTO: 8.23 K/UL (ref 3.9–12.7)

## 2025-04-10 PROCEDURE — 80048 BASIC METABOLIC PNL TOTAL CA: CPT | Performed by: SURGERY

## 2025-04-10 PROCEDURE — 36200 PLACE CATHETER IN AORTA: CPT | Mod: XU | Performed by: SURGERY

## 2025-04-10 PROCEDURE — 85730 THROMBOPLASTIN TIME PARTIAL: CPT | Performed by: SURGERY

## 2025-04-10 PROCEDURE — 85610 PROTHROMBIN TIME: CPT | Performed by: SURGERY

## 2025-04-10 PROCEDURE — 63600175 PHARM REV CODE 636 W HCPCS: Performed by: SURGERY

## 2025-04-10 PROCEDURE — 75630 X-RAY AORTA LEG ARTERIES: CPT | Performed by: SURGERY

## 2025-04-10 PROCEDURE — 25500020 PHARM REV CODE 255: Performed by: SURGERY

## 2025-04-10 PROCEDURE — 36222 PLACE CATH CAROTID/INOM ART: CPT | Mod: LT | Performed by: SURGERY

## 2025-04-10 PROCEDURE — 85025 COMPLETE CBC W/AUTO DIFF WBC: CPT | Performed by: SURGERY

## 2025-04-10 PROCEDURE — 25000003 PHARM REV CODE 250: Mod: UD | Performed by: SURGERY

## 2025-04-10 RX ORDER — FENTANYL CITRATE 50 UG/ML
INJECTION, SOLUTION INTRAMUSCULAR; INTRAVENOUS
Status: DISCONTINUED | OUTPATIENT
Start: 2025-04-10 | End: 2025-04-10 | Stop reason: HOSPADM

## 2025-04-10 RX ORDER — SODIUM CHLORIDE 9 MG/ML
INJECTION, SOLUTION INTRAVENOUS ONCE
Status: COMPLETED | OUTPATIENT
Start: 2025-04-10 | End: 2025-04-10

## 2025-04-10 RX ORDER — MIDAZOLAM HYDROCHLORIDE 1 MG/ML
INJECTION INTRAMUSCULAR; INTRAVENOUS
Status: DISCONTINUED | OUTPATIENT
Start: 2025-04-10 | End: 2025-04-10 | Stop reason: HOSPADM

## 2025-04-10 RX ORDER — LIDOCAINE HYDROCHLORIDE 10 MG/ML
INJECTION, SOLUTION EPIDURAL; INFILTRATION; INTRACAUDAL; PERINEURAL
Status: DISCONTINUED | OUTPATIENT
Start: 2025-04-10 | End: 2025-04-10 | Stop reason: HOSPADM

## 2025-04-10 RX ORDER — ACETAMINOPHEN 325 MG/1
650 TABLET ORAL EVERY 4 HOURS PRN
Status: DISCONTINUED | OUTPATIENT
Start: 2025-04-10 | End: 2025-04-10 | Stop reason: HOSPADM

## 2025-04-10 RX ORDER — ONDANSETRON 4 MG/1
8 TABLET, ORALLY DISINTEGRATING ORAL EVERY 8 HOURS PRN
Status: DISCONTINUED | OUTPATIENT
Start: 2025-04-10 | End: 2025-04-10 | Stop reason: HOSPADM

## 2025-04-10 RX ORDER — IODIXANOL 320 MG/ML
INJECTION, SOLUTION INTRAVASCULAR
Status: DISCONTINUED | OUTPATIENT
Start: 2025-04-10 | End: 2025-04-10 | Stop reason: HOSPADM

## 2025-04-10 RX ORDER — SODIUM CHLORIDE 9 MG/ML
INJECTION, SOLUTION INTRAVENOUS CONTINUOUS
Status: DISCONTINUED | OUTPATIENT
Start: 2025-04-10 | End: 2025-04-10 | Stop reason: HOSPADM

## 2025-04-10 RX ADMIN — SODIUM CHLORIDE: 9 INJECTION, SOLUTION INTRAVENOUS at 06:04

## 2025-04-10 NOTE — Clinical Note
An angiography of the  bilateral lower extremity was performed with the catheter and hand injected with 10 mL of contrast

## 2025-04-10 NOTE — Clinical Note
An angiography was performed of the proximal right common carotid via hand injection with 5 mL of contrast

## 2025-04-10 NOTE — Clinical Note
ID band present and verified. Family is in the lobby. [FreeTextEntry1] : 57 year old  woman with history of NICM (most recent EF 50%)  s/p CRT-D , hx of severe MR s/p MVR repair (04/05/2013),NYHA II, HTN, HLD who presents for routine follow up of her non ischemic cardiomyopathy. \par \par LHC done March 2017 showed nonobstructive CAD.  \par \par Feels well with no symptoms\par \par Under social stress due to death in family.

## 2025-04-10 NOTE — OP NOTE
[facility]  Surgery Department  Operative Note    SUMMARY     Date of Procedure: 4/10/2025     Procedure: Procedure(s) (LRB):  AORTOGRAM, WITH EXTREMITY RUNOFF (N/A)     Surgeons and Role:     * John Ewing MD - Primary    Assisting Surgeon: None    Pre-Operative Diagnosis: Claudication, intermittent [I73.9]    Post-Operative Diagnosis: Post-Op Diagnosis Codes:     * Claudication, intermittent [I73.9]    Anesthesia: RN IV Sedation    Operative Findings (including complications, if any):  Patent bilateral iliac stents.  Patent bilateral lower extremity runoff with three-vessel runoff.  Severe left internal iliac stenosis and diminutive right internal iliac.  Left internal carotid artery 80% recurrent stenosis    Description of Technical Procedures:  Aortogram with bilateral lower extremity runoff.  Cervical and cerebral left carotid angiography    Right femoral artery accessed through the femoral head with ultrasound guidance and 5 Icelandic sheath placed.  Catheter and wire advanced up to the abdominal aorta abdominal aortogram performed.  Catheter pulled down the bifurcation and JEFF and FLORES angiogram of the iliac vessels were exposed forearm and then bilateral lower extremity runoff was performed.  We then exchanged out the wire for a JR4 catheter and selected the brachiocephalic trunk in angiogram demonstrated that he had a bovine arch we then switched out to a Curtis 1 catheter and selected the left common carotid artery and left cervical and cerebral carotid angiography was was performed.  Findings are patent abdominal aorta.  The kissing stents in the common iliac arteries are both patent.  There is a right distal common iliac separate stent which is also patent.  The distal left common iliac artery was patent the left internal iliac artery is nearly occlusive.  The right internal iliac artery is severely diseased but is more developed than the left.  The right common femoral artery has what  appears to be a femoral endarterectomy site which is widely patent.  Bilateral profunda superficial femoral artery and popliteal arteries are patent with three-vessel runoff.  The left common carotid artery is patent and then distally at the bifurcation of the external carotid is occluded and the internal carotid artery has a severe recurrent stenosis at what appears to be the distal endpoint of a previous carotid patch.    Significant Surgical Tasks Conducted by the Assistant(s), if Applicable:     Estimated Blood Loss (EBL): * No values recorded between 4/10/2025 12:00 AM and 4/10/2025  8:17 AM *           Implants: * No implants in log *    Specimens:   Specimen (24h ago, onward)      None           * No specimens in log *           Condition: Good    Disposition: PACU - hemodynamically stable.    Attestation: Op Note Attestation: I was physically present and scrubbed for the entire procedure.

## 2025-04-10 NOTE — Clinical Note
An angiography was performed of the distal suprarenal abdominal aorta  via hand injection with 10 mL of contrast

## 2025-04-10 NOTE — Clinical Note
An angiography was performed of the distal left common carotid via hand injection with 5 mL of contrast

## 2025-04-10 NOTE — DISCHARGE SUMMARY
Pending sale to [facility]  Discharge Note  Short Stay    Procedure(s) (LRB):  AORTOGRAM, WITH EXTREMITY RUNOFF (N/A)      OUTCOME: Patient tolerated treatment/procedure well without complication and is now ready for discharge.    DISPOSITION: Home or Self Care    FINAL DIAGNOSIS:  <principal problem not specified>    FOLLOWUP: In clinic    DISCHARGE INSTRUCTIONS:  No discharge procedures on file.     TIME SPENT ON DISCHARGE: 5 minutes

## 2025-05-01 NOTE — H&P (VIEW-ONLY)
VASCULAR SURGERY CLINIC NOTE         Referring Provider:      No ref. provider found     Chief Complaint:     No chief complaint on file.       HPI:     Darien Le is a 65 y.o. male history of bilateral lower extremity claudication.  Possible history of kissing iliac stents.  CTA of the neck in 2000 23 showed less than 50% stenosis on the right and patent left carotid endarterectomy site.  Has a history of a right groin incision which may be consistent with a femoral endarterectomy.  CTA reviewed.  Of his aorta with runoff and appears to have some distal common iliac stenosis right at the bifurcation bilaterally.  Possibly associated with InStent stenosis.  Has a history of smoking about a pack and half a day.    Post op angio with patent iliac stents and runoff, and severe recurrent left ica stenosis of 80%.  Recently had to stop his Plavix because of GI intolerance.      Medication List:        Current Outpatient Medications:     albuterol sulfate (PROVENTIL HFA;VENTOLIN HFA) 90 mcg/actuation inhaler, Inhale 2 puffs into the lungs every 4 (four) hours as needed, Disp: , Rfl:     amLODIPine (NORVASC) 10 MG tablet, Take 1 tablet by mouth, Disp: , Rfl:     aspirin (LO-DOSE ASPIRIN) 81 MG EC tablet, Take 1 tablet by mouth, Disp: , Rfl:     atenoloL (TENORMIN) 100 MG tablet, Take 1 tablet by mouth, Disp: , Rfl:     atorvastatin (LIPITOR) 20 MG tablet, Take by mouth daily, Disp: , Rfl:     benzonatate (TESSALON PERLES) 100 MG capsule, Take 1 capsule by mouth, Disp: , Rfl:     budesonide-formoteroL (SYMBICORT) 160-4.5 mcg/actuation inhaler, Inhale 2 puffs into the lungs, Disp: , Rfl:     cloNIDine HCL (CATAPRES) 0.2 MG tablet, Take 0.5 tablets by mouth, Disp: , Rfl:     fluticasone propionate (FLONASE) 50 mcg/actuation nasal spray, 2 sprays by Nasal route, Disp: , Rfl:     furosemide (LASIX) 20 MG tablet, Take 1 tablet by mouth, Disp: , Rfl:     hydrALAZINE (APRESOLINE) 25 MG tablet, Take 1 tablet by mouth,  Disp: , Rfl:     ipratropium (ATROVENT) 0.02 % nebulizer solution, Inhale 2.5 mLs into the lungs, Disp: , Rfl:     lisinopriL (PRINIVIL,ZESTRIL) 20 MG tablet, Take 1 tablet by mouth once daily, Disp: , Rfl:     meloxicam (MOBIC) 7.5 MG tablet, Take 1 tablet by mouth, Disp: , Rfl:     mupirocin (BACTROBAN) 2 % ointment, APPLY OINTMENT TWICE DAILY TO INSIDE OF NOSTRILS FOR 3 DAYS PRIOR AND 7 DAYS POST PROCEDURE, Disp: , Rfl:     nicotine (NICODERM CQ) 21 mg/24 hr, Place 1 patch onto the skin daily, Disp: , Rfl:     PLAVIX 75 mg tablet, Take by mouth daily, Disp: , Rfl:     potassium chloride (KLOR-CON 10) 10 MEQ CR tablet, Take 1 tablet by mouth, Disp: , Rfl:     PROTONIX 40 mg tablet, Take 1 tablet by mouth once daily, Disp: , Rfl:     roflumilast (DALIRESP) 250 mcg Tab, TAKE 1 TABLET BY MOUTH ONCE DAILY FOR 28 DAYS, Disp: , Rfl:     SINGULAIR 10 mg tablet, TAKE 1 TABLET BY MOUTH ONCE DAILY IN THE EVENING, Disp: , Rfl:     tiotropium (SPIRIVA) 18 mcg inhalation capsule, Inhale 1 capsule into the lungs, Disp: , Rfl:     TRELEGY ELLIPTA 100-62.5-25 mcg DsDv powder inhaler, INHALE 1 PUFF INTO LUNGS ONCE DAILY AT THE SAME TIME EVERY DAY, Disp: , Rfl:     WELLBUTRIN  mg 12 hr tablet, Take 1 tablet by mouth 2 (two) times daily, Disp: , Rfl:     Past Medical History:     Past Medical History:   Diagnosis Date    Arthritis     Chronic obstructive pulmonary disease (CMS/HCC)     HTN (hypertension)        Past Surgical History:     Past Surgical History:   Procedure Laterality Date    COLONOSCOPY      EXCISION OF SOFT TISSUE       NASAL SEPTUM SURGERY      NECK SURGERY  2018       ROS:     Review of Systems   Constitutional:  Negative for chills and fever.   Respiratory:  Negative for shortness of breath.    Cardiovascular:  Negative for chest pain.   Gastrointestinal:  Negative for abdominal pain.        Neg for post prandial pain or weight loss   Musculoskeletal:  Negative for back pain.         Bilateral claudication   Neurological:  Negative for speech difficulty.        Neg for amaurosis or TIA          Allergies:      Allergies   Allergen Reactions    Umeclidinium Bromide Swelling    Codeine Nausea And Vomiting and Other (See Comments)       Social History:     Social History     Socioeconomic History    Marital status:      Spouse name: Not on file    Number of children: Not on file    Years of education: Not on file    Highest education level: Not on file   Occupational History    Not on file   Tobacco Use    Smoking status: Not on file    Smokeless tobacco: Not on file   Substance and Sexual Activity    Alcohol use: Not on file    Drug use: Not on file    Sexual activity: Not on file   Other Topics Concern    Not on file   Social History Narrative    Not on file     Social Determinants of Health     Financial Resource Strain: Low Risk  (2/16/2025)    [facility]    Overall Financial Resource Strain (CARDIA)     Difficulty of Paying Living Expenses: Not very hard   Food Insecurity: No Food Insecurity (2/16/2025)    Received from [facility]    Hunger Vital Sign     Worried About Running Out of Food in the Last Year: Never true     Ran Out of Food in the Last Year: Never true   Transportation Needs: No Transportation Needs (2/16/2025)    Received from [facility] and [facility]    PRAPARE - Transportation     Lack of Transportation (Medical): No     Lack of Transportation (Non-Medical): No   Physical Activity: Insufficiently Active (2/16/2025)    [facility]    Exercise Vital Sign     Days of Exercise per Week: 7 days     Minutes of Exercise per Session: 10 min   Stress: Stress Concern Present (2/16/2025)    [facility]     [employer] [place] [employer] - Occupational Stress Questionnaire     Feeling of Stress : To some extent   Social Connections: Not on file   Intimate Partner Violence: Not on file   Housing Stability: Low Risk  (2/16/2025)    [facility], [facility]    Housing Stability Vital Sign     Unable to Pay for Housing in the Last Year: No     Number of Times Moved in the Last Year: 0     Homeless in the Last Year: No       Family History:     Family History   Problem Relation Age of Onset    Heart disease Mother     Cancer Father        Physical Exam:     There were no vitals filed for this visit.     Weight:     There is no height or weight on file to calculate BMI.    Physical Exam  Constitutional:       Appearance: Normal appearance.   Neck:      Vascular: No carotid bruit.      Comments: Well-healed left carotid incision  Cardiovascular:      Rate and Rhythm: Normal rate and regular rhythm.      Pulses:           Radial pulses are 2+ on the right side and 2+ on the left side.        Femoral pulses are 2+ on the right side and 2+ on the left side.       Dorsalis pedis pulses are 2+ on the right side and 2+ on the left side.      Comments: Well-healed right groin incision  Abdominal:      Palpations: Abdomen is soft. There is no mass.      Tenderness: There is no abdominal tenderness.   Neurological:      General: No focal deficit present.      Mental Status: He is alert and oriented to person, place, and time.      Sensory: No sensory deficit.      Motor: No weakness.            Laboratory   LABS    CBC  Lab Results   Component Value Date    WBC 9.15 09/19/2023    HGB 16.1 09/19/2023    HEMATOCRIT 45.1 09/19/2023    MCV 86 09/19/2023    MCH 30.6 09/19/2023    MCHC 35.7 09/19/2023    RDW 13.2 09/19/2023     09/19/2023    MPV 10.4 09/19/2023    MONOABS 0.9 09/19/2023    BASOPCT 0.7 09/19/2023        BMP/CMP  Lab Results   Component Value Date     02/26/2025    K 3.5 02/26/2025     02/26/2025    CO2 17 (L) 02/26/2025     02/26/2025    BUN 11 02/26/2025    CREATININE 0.7 02/26/2025    CALCIUM 9.6 02/26/2025    PROT 6.8 02/26/2025    ALBUMIN 3.9 02/26/2025    BILIRUBIN 0.4 02/26/2025    AST 12 02/26/2025    ALKPHOS 57 02/26/2025    ALT 10 02/26/2025    GFRAA >60.0 02/14/2022         Problem List:     There are no problems to display for this patient.      Medications and Orders:       There are no diagnoses linked to this encounter.       Assessment and Plan:       Diagnoses and all orders for this visit:    Stenosis of left carotid artery    Lumbar radiculopathy    Smoking addiction        Carotid ultrasound positive for recurrent stenosis on the left.  Schedule aortogram with runoff to better evaluate iliac stenosis.  If symptoms do not resolve after iliac intervention then may want to consider lumbar disc disease.  We will also do left carotid angiogram    Intolerance to Plavix limits as as far as carotid stenting.  Based on angiogram he is a candidate for reoperation of the carotid.  Schedule reoperation of left carotid with vein patch and get clearance from Dr. Kasia Ewing MD  8:29 AM  5/1/2025

## 2025-05-06 ENCOUNTER — TELEPHONE (OUTPATIENT)
Dept: CARDIOLOGY | Facility: CLINIC | Age: 65
End: 2025-05-06

## 2025-05-09 ENCOUNTER — TELEPHONE (OUTPATIENT)
Dept: CARDIOLOGY | Facility: CLINIC | Age: 65
End: 2025-05-09
Payer: MEDICARE

## 2025-05-09 NOTE — TELEPHONE ENCOUNTER
----- Message from Michelle sent at 5/9/2025  9:46 AM CDT -----  Type:  Needs Medical AdviceWho Called: pt wife babita Would the patient rather a call back or a response via [facility]? Call backBest Call Back Number: [phone] -- [phone] pt wife Marlydditional Information: pt wife babita st pt needs clearance for surgery on the 21st. Fax #[phone]. Pt wife st its best to contact her. please call to discuss  ----- Message -----  From: Michelle Moss  Sent: 5/9/2025   9:48 AM CDT  To: Kasia Serrato Staff    Type:  Needs Medical AdviceWho Called: pt wife babtia Would the patient rather a call back or a response via [facility]? Call backBest Call Back Number: [phone] Additional Information: pt wife babita st pt needs clearance for surgery on the 21st. Fax #[phone]. please call to discuss

## 2025-05-19 ENCOUNTER — ANESTHESIA EVENT (OUTPATIENT)
Dept: SURGERY | Facility: HOSPITAL | Age: 65
End: 2025-05-19
Payer: MEDICARE

## 2025-05-19 ENCOUNTER — HOSPITAL ENCOUNTER (OUTPATIENT)
Dept: PREADMISSION TESTING | Facility: HOSPITAL | Age: 65
Discharge: HOME | End: 2025-05-19
Attending: SURGERY
Payer: MEDICARE

## 2025-05-19 VITALS
HEIGHT: 67 IN | DIASTOLIC BLOOD PRESSURE: 74 MMHG | WEIGHT: 166 LBS | SYSTOLIC BLOOD PRESSURE: 130 MMHG | HEART RATE: 84 BPM | BODY MASS INDEX: 26.06 KG/M2 | RESPIRATION RATE: 14 BRPM | OXYGEN SATURATION: 96 % | TEMPERATURE: 98 F

## 2025-05-19 DIAGNOSIS — Z01.818 PRE-OP TESTING: Primary | ICD-10-CM

## 2025-05-19 LAB — ABORH RETYPE: NORMAL

## 2025-05-19 RX ORDER — CEFAZOLIN 2 G/1
2 INJECTION, POWDER, FOR SOLUTION INTRAMUSCULAR; INTRAVENOUS ONCE
Status: CANCELLED | OUTPATIENT
Start: 2025-05-21

## 2025-05-19 RX ORDER — NAPROXEN SODIUM 220 MG/1
81 TABLET, FILM COATED ORAL DAILY
COMMUNITY

## 2025-05-19 NOTE — ANESTHESIA PREPROCEDURE EVALUATION
05/19/2025  Darien Le is a 65 y.o., male.      Results for orders placed or performed during the hospital encounter of 03/31/25   EKG 12-lead    Collection Time: 03/31/25  8:38 AM   Result Value Ref Range    QRS Duration 102 ms    [facility]HS QTC Calculation 430 ms    Narrative    Test Reason : Z01.818,    Vent. Rate :  65 BPM     Atrial Rate :  65 BPM     P-R Int : 168 ms          QRS Dur : 102 ms      QT Int : 414 ms       P-R-T Axes :  81  62  61 degrees    QTcB Int : 430 ms    Normal sinus rhythm  Normal ECG  When compared with ECG of 17-Feb-2025 09:08,  Questionable change in The axis  Confirmed by Pablito Murphy ([id number]) on 3/31/2025 9:53:44 PM    Referred By:            Confirmed By: Pablito Murphy             Lab Results   Component Value Date    WBC 10.28 05/19/2025    HGB 13.3 (L) 05/19/2025    HCT 39.2 (L) 05/19/2025    MCV 86 05/19/2025     05/19/2025     BMP  Lab Results   Component Value Date     05/19/2025    K 3.8 05/19/2025     05/19/2025    CO2 24 05/19/2025    BUN 15 05/19/2025    CREATININE 0.8 05/19/2025    CALCIUM 9.6 05/19/2025    ANIONGAP 9 05/19/2025     05/19/2025    GLU 98 04/10/2025     03/31/2025       Results for orders placed during the hospital encounter of 02/25/25    Echo    Interpretation Summary    Left Ventricle: The left ventricle is normal in size. Normal wall thickness. There is concentric remodeling. Mild global hypokinesis present. There is low normal systolic function with a visually estimated ejection fraction of 50 - 55%. There is normal diastolic function.    Right Ventricle: The right ventricle is normal in size. Wall thickness is normal. Systolic function is normal.    Aortic Valve: There is moderate aortic valve sclerosis.    IVC/SVC: Normal venous pressure at 3 mmHg.         Pre-op Assessment    I have reviewed the Patient Summary  Reports.     I have reviewed the Nursing Notes. I have reviewed the NPO Status.   I have reviewed the Medications.     Review of Systems  Anesthesia Hx:  No problems with previous Anesthesia             Denies Family Hx of Anesthesia complications.    Denies Personal Hx of Anesthesia complications.                    Social:  No Alcohol Use, Smoker       Hematology/Oncology:    Oncology Normal    -- Anemia:                                  EENT/Dental:  EENT/Dental Normal           Cardiovascular:     Hypertension, poorly controlled          PVD    ECG has been reviewed. Patient cleared for surgery with moderate risk by Dr. Pedroza  Claudication  Peripheral artery stents              Carotoid Artery Disease, left, s/p carotid endarectomy               Pulmonary:   COPD      Mucopurulent chronic bronchitis  Albuterol inhaler use  Trelegy Ellipta use  Oxygen saturation 96% preop clinic  Home Oxygen as needed  2 L / NC                Renal/:  Renal/ Normal                 Hepatic/GI:   PUD, Hiatal Hernia, GERD, well controlled        Esophageal / Stomach Disorders Esophageal Disorder, dysphagia          Musculoskeletal:  Arthritis   Chronic pain of left knee    Muscle weakness         Spine Disorders: cervical and lumbar Chronic Pain           Neurological:  Neurology Normal                                      Endocrine:  Endocrine Normal            Psych:  Psychiatric Normal                    Physical Exam  General: Well nourished, Cooperative, Alert and Oriented    Airway:  Mallampati: III   Mouth Opening: Normal  TM Distance: > 6 cm  Tongue: Normal  Neck ROM: Normal ROM    Dental:  Dentures, Edentulous    Chest/Lungs:  Clear to auscultation, Normal Respiratory Rate    Heart:  Rate: Normal  Rhythm: Regular Rhythm        Anesthesia Plan  Type of Anesthesia, risks & benefits discussed:    Anesthesia Type: Gen ETT  Intra-op Monitoring Plan: Standard ASA Monitors and Art Line  Post Op Pain Control Plan: multimodal  analgesia and IV/PO Opioids PRN  Induction:  IV  Airway Plan: Video, Post-Induction  Informed Consent: Informed consent signed with the Patient and all parties understand the risks and agree with anesthesia plan.  All questions answered.   ASA Score: 3  Anesthesia Plan Notes:       GETA  LTA  Radial Artery Catheter   Second Peripheral IV  No Precedex  No Ketamine  Decadron 4mg, iv Zofran 4mg iv, Pepcid 20mg iv   Ofirmev 1000mg iv  Sugammadex        Ready For Surgery From Anesthesia Perspective.     .

## 2025-05-19 NOTE — DISCHARGE INSTRUCTIONS
To confirm, Your doctor has instructed you that surgery is scheduled for:     Pre-Op will call the afternoon prior to surgery between 4:00 and 6:00 PM with the final arrival time.       1 Person can come with you the day of surgery.    Please  register at Main Entrance/ [facility] located to the left of the Emergency Room.     Do not eat  anything after midnight the night before your surgery - THIS INCLUDES  GUM, MINTS AND CANDY.    You may drink water, gatorade up until 2 hours of your hospital arrival time       TAKE ONLY THESE MEDICATIONS WITH A SMALL SIP OF WATER THE MORNING OF YOUR PROCEDURE:      ONLY if you are diabetic, check your sugar in the morning before your procedure.       Do not take any diabetic medicines or insulin the morning of surgery .     PLEASE NOTE:  The surgery schedule has many variables which may affect the time of your surgery case.  Family members should be available if your surgery time changes.  Plan to be here the day of your procedure between 4-6 hours.      DO NOT TAKE THESE MEDICATIONS 5-7 DAYS PRIOR to your procedure or per your surgeon's request: ASPIRIN, ALEVE, ADVIL, IBUPROFEN,  MARZENA SELTZER, BC , FISH OIL , VITAMIN E, HERBALS  (May take Tylenol)      ONLY if you are prescribed any types of blood thinners such as:  Aspirin, Coumadin, Plavix, Pradaxa, Xarelto, Aggrenox, Effient, Eliquis, Savasya, Brilinta, or any other, ask your surgeon whether you should stop taking them and how long before surgery you should stop.  You may also need to verify with the prescribing physician if it is ok to stop your medication.                                                        IMPORTANT INSTRUCTIONS      Do not smoke, vape or drink alcoholic beverages 24 hours prior to your procedure.  Shower the night before AND the morning of your procedure with a Chlorhexidine wash such as Hibiclens or Dial antibacterial soap from the neck down.   No lotions, powder or oils on your skin after you  shower. DO NOT WEAR DEODORANT  Do not wear makeup.    Do not get it on your face or in your eyes.  You may use your own shampoo and face wash. This helps your skin to be as bacteria free as possible.    DO NOT remove hair from the surgery site.  Do not shave the incision site unless you are given specific instructions to do so.    Sleep in a bed with clean sheets.    Do not sleep with a pet in the bed.   If you wear contact lenses, dentures, hearing aids or glasses, bring a container to put them in during surgery and give to a family member for safe keeping.    Please leave all jewelry, piercing's and valuables at home.   Wear rubber soled shoes (no flip flops).  ONLY if you have been diagnosed with sleep apnea please bring your C-PAP machine.  ONLY if you wear home oxygen please bring your portable oxygen tank the day of your procedure.   ONLY for patients requiring bowel prep, written instructions will be given by your doctor's office.  ONLY if you have a neuro stimulator, please bring the controller with you the morning of surgery  ONLY if a type and screen test is needed before surgery, please return:  If your doctor has scheduled you for an overnight stay, bring a small overnight bag with any personal items you need.    Make arrangements in advance for transportation home by a responsible adult.      You must make arrangements for transportation, TAXI'S, UBER'S OR LYFTS ARE NOT ALLOWED.        If you have any questions about these instructions, call (Monday - Friday) Pre-Op Admit  Nursing  at [phone] or the Pre-Op Day Surgery Unit at [phone].

## 2025-05-21 ENCOUNTER — HOSPITAL ENCOUNTER (INPATIENT)
Facility: HOSPITAL | Age: 65
LOS: 1 days | Discharge: HOME | DRG: 039 | End: 2025-05-22
Attending: SURGERY | Admitting: SURGERY
Payer: MEDICARE

## 2025-05-21 ENCOUNTER — ANESTHESIA (OUTPATIENT)
Dept: SURGERY | Facility: HOSPITAL | Age: 65
End: 2025-05-21
Payer: MEDICARE

## 2025-05-21 DIAGNOSIS — I77.9 LEFT-SIDED CAROTID ARTERY DISEASE, UNSPECIFIED TYPE: Primary | ICD-10-CM

## 2025-05-21 DIAGNOSIS — Z01.818 PRE-OP TESTING: ICD-10-CM

## 2025-05-21 PROBLEM — I65.22 LEFT CAROTID STENOSIS: Status: ACTIVE | Noted: 2025-05-21

## 2025-05-21 LAB
ABSOLUTE EOSINOPHIL (SMH): 0.22 K/UL
ABSOLUTE MONOCYTE (SMH): 1.31 K/UL (ref 0.3–1)
ABSOLUTE NEUTROPHIL COUNT (SMH): 13.8 K/UL (ref 1.8–7.7)
ANION GAP (SMH): 10 MMOL/L (ref 8–16)
BASOPHILS # BLD AUTO: 0.08 K/UL
BASOPHILS NFR BLD AUTO: 0.4 %
BUN SERPL-MCNC: 15 MG/DL (ref 8–23)
CALCIUM SERPL-MCNC: 8.7 MG/DL (ref 8.7–10.5)
CHLORIDE SERPL-SCNC: 107 MMOL/L (ref 95–110)
CO2 SERPL-SCNC: 24 MMOL/L (ref 23–29)
CREAT SERPL-MCNC: 0.8 MG/DL (ref 0.5–1.4)
ERYTHROCYTE [DISTWIDTH] IN BLOOD BY AUTOMATED COUNT: 13.8 % (ref 11.5–14.5)
GFR SERPLBLD CREATININE-BSD FMLA CKD-EPI: >60 ML/MIN/1.73/M2
GLUCOSE SERPL-MCNC: 153 MG/DL (ref 70–110)
HCT VFR BLD AUTO: 33.7 % (ref 40–54)
HGB BLD-MCNC: 11.4 GM/DL (ref 14–18)
IMM GRANULOCYTES # BLD AUTO: 0.31 K/UL (ref 0–0.04)
IMM GRANULOCYTES NFR BLD AUTO: 1.7 % (ref 0–0.5)
LYMPHOCYTES # BLD AUTO: 2.37 K/UL (ref 1–4.8)
MCH RBC QN AUTO: 29.4 PG (ref 27–31)
MCHC RBC AUTO-ENTMCNC: 33.8 G/DL (ref 32–36)
MCV RBC AUTO: 87 FL (ref 82–98)
NUCLEATED RBC (/100WBC) (SMH): 0 /100 WBC
PLATELET # BLD AUTO: 273 K/UL (ref 150–450)
PMV BLD AUTO: 10 FL (ref 9.2–12.9)
POC ACTIVATED CLOTTING TIME K: 130 SEC (ref 74–137)
POC ACTIVATED CLOTTING TIME K: 135 SEC (ref 74–137)
POC ACTIVATED CLOTTING TIME K: 245 SEC (ref 74–137)
POTASSIUM SERPL-SCNC: 3.5 MMOL/L (ref 3.5–5.1)
RBC # BLD AUTO: 3.88 M/UL (ref 4.6–6.2)
RELATIVE EOSINOPHIL (SMH): 1.2 % (ref 0–8)
RELATIVE LYMPHOCYTE (SMH): 13.1 % (ref 18–48)
RELATIVE MONOCYTE (SMH): 7.2 % (ref 4–15)
RELATIVE NEUTROPHIL (SMH): 76.4 % (ref 38–73)
SAMPLE: ABNORMAL
SAMPLE: NORMAL
SAMPLE: NORMAL
SODIUM SERPL-SCNC: 141 MMOL/L (ref 136–145)
WBC # BLD AUTO: 18.09 K/UL (ref 3.9–12.7)

## 2025-05-21 PROCEDURE — 99900031 HC PATIENT EDUCATION (STAT)

## 2025-05-21 PROCEDURE — 63600175 PHARM REV CODE 636 W HCPCS

## 2025-05-21 PROCEDURE — 82435 ASSAY OF BLOOD CHLORIDE: CPT | Performed by: SURGERY

## 2025-05-21 PROCEDURE — 25000003 PHARM REV CODE 250: Performed by: SURGERY

## 2025-05-21 PROCEDURE — 36000706: Performed by: SURGERY

## 2025-05-21 PROCEDURE — 94799 UNLISTED PULMONARY SVC/PX: CPT

## 2025-05-21 PROCEDURE — 37000009 HC ANESTHESIA EA ADD 15 MINS: Performed by: SURGERY

## 2025-05-21 PROCEDURE — C9248 INJ, CLEVIDIPINE BUTYRATE: HCPCS | Mod: JZ,TB,UD

## 2025-05-21 PROCEDURE — 63600175 PHARM REV CODE 636 W HCPCS: Mod: JZ,TB,UD | Performed by: SURGERY

## 2025-05-21 PROCEDURE — 20000000 HC ICU ROOM

## 2025-05-21 PROCEDURE — 37000008 HC ANESTHESIA 1ST 15 MINUTES: Performed by: SURGERY

## 2025-05-21 PROCEDURE — 03UL07Z SUPPLEMENT LEFT INTERNAL CAROTID ARTERY WITH AUTOLOGOUS TISSUE SUBSTITUTE, OPEN APPROACH: ICD-10-PCS | Performed by: SURGERY

## 2025-05-21 PROCEDURE — 25000003 PHARM REV CODE 250: Mod: UD

## 2025-05-21 PROCEDURE — 85025 COMPLETE CBC W/AUTO DIFF WBC: CPT | Performed by: SURGERY

## 2025-05-21 PROCEDURE — 25000242 PHARM REV CODE 250 ALT 637 W/ HCPCS: Performed by: SURGERY

## 2025-05-21 PROCEDURE — 25000003 PHARM REV CODE 250: Performed by: ANESTHESIOLOGY

## 2025-05-21 PROCEDURE — 25000003 PHARM REV CODE 250: Mod: UD | Performed by: SURGERY

## 2025-05-21 PROCEDURE — C1729 CATH, DRAINAGE: HCPCS | Performed by: SURGERY

## 2025-05-21 PROCEDURE — 94761 N-INVAS EAR/PLS OXIMETRY MLT: CPT

## 2025-05-21 PROCEDURE — 71000033 HC RECOVERY, INTIAL HOUR: Performed by: SURGERY

## 2025-05-21 PROCEDURE — C9248 INJ, CLEVIDIPINE BUTYRATE: HCPCS | Mod: JZ,TB,UD | Performed by: SURGERY

## 2025-05-21 PROCEDURE — 27000221 HC OXYGEN, UP TO 24 HOURS

## 2025-05-21 PROCEDURE — 36000707: Performed by: SURGERY

## 2025-05-21 PROCEDURE — 27201423 OPTIME MED/SURG SUP & DEVICES STERILE SUPPLY: Performed by: SURGERY

## 2025-05-21 PROCEDURE — 03CL0ZZ EXTIRPATION OF MATTER FROM LEFT INTERNAL CAROTID ARTERY, OPEN APPROACH: ICD-10-PCS | Performed by: SURGERY

## 2025-05-21 PROCEDURE — 63600175 PHARM REV CODE 636 W HCPCS: Performed by: SURGERY

## 2025-05-21 PROCEDURE — 71000039 HC RECOVERY, EACH ADD'L HOUR: Performed by: SURGERY

## 2025-05-21 PROCEDURE — 99900035 HC TECH TIME PER 15 MIN (STAT)

## 2025-05-21 PROCEDURE — 63600175 PHARM REV CODE 636 W HCPCS: Mod: JZ | Performed by: ANESTHESIOLOGY

## 2025-05-21 PROCEDURE — 06BQ0ZZ EXCISION OF LEFT SAPHENOUS VEIN, OPEN APPROACH: ICD-10-PCS | Performed by: SURGERY

## 2025-05-21 PROCEDURE — 88304 TISSUE EXAM BY PATHOLOGIST: CPT | Mod: TC | Performed by: PATHOLOGY

## 2025-05-21 RX ORDER — AMLODIPINE BESYLATE 5 MG/1
10 TABLET ORAL DAILY
Status: DISCONTINUED | OUTPATIENT
Start: 2025-05-22 | End: 2025-05-22 | Stop reason: HOSPADM

## 2025-05-21 RX ORDER — ALBUTEROL SULFATE 0.83 MG/ML
2.5 SOLUTION RESPIRATORY (INHALATION) EVERY 4 HOURS PRN
Status: DISCONTINUED | OUTPATIENT
Start: 2025-05-21 | End: 2025-05-22 | Stop reason: HOSPADM

## 2025-05-21 RX ORDER — HYDRALAZINE HYDROCHLORIDE 25 MG/1
25 TABLET, FILM COATED ORAL EVERY 12 HOURS
Status: DISCONTINUED | OUTPATIENT
Start: 2025-05-21 | End: 2025-05-22 | Stop reason: HOSPADM

## 2025-05-21 RX ORDER — GLUCAGON 1 MG
1 KIT INJECTION
Status: DISCONTINUED | OUTPATIENT
Start: 2025-05-21 | End: 2025-05-21 | Stop reason: HOSPADM

## 2025-05-21 RX ORDER — ONDANSETRON HYDROCHLORIDE 2 MG/ML
4 INJECTION, SOLUTION INTRAVENOUS DAILY PRN
Status: DISCONTINUED | OUTPATIENT
Start: 2025-05-21 | End: 2025-05-21 | Stop reason: HOSPADM

## 2025-05-21 RX ORDER — SODIUM CHLORIDE 9 MG/ML
INJECTION, SOLUTION INTRAVENOUS CONTINUOUS
Status: DISCONTINUED | OUTPATIENT
Start: 2025-05-21 | End: 2025-05-22 | Stop reason: HOSPADM

## 2025-05-21 RX ORDER — DIPHENHYDRAMINE HYDROCHLORIDE 50 MG/ML
12.5 INJECTION, SOLUTION INTRAMUSCULAR; INTRAVENOUS ONCE AS NEEDED
Status: DISCONTINUED | OUTPATIENT
Start: 2025-05-21 | End: 2025-05-21 | Stop reason: HOSPADM

## 2025-05-21 RX ORDER — BUDESONIDE 0.5 MG/2ML
0.5 INHALANT ORAL EVERY 12 HOURS
Status: DISCONTINUED | OUTPATIENT
Start: 2025-05-21 | End: 2025-05-22 | Stop reason: HOSPADM

## 2025-05-21 RX ORDER — HYDROMORPHONE HYDROCHLORIDE 1 MG/ML
0.2 INJECTION, SOLUTION INTRAMUSCULAR; INTRAVENOUS; SUBCUTANEOUS EVERY 5 MIN PRN
Status: DISCONTINUED | OUTPATIENT
Start: 2025-05-21 | End: 2025-05-21 | Stop reason: HOSPADM

## 2025-05-21 RX ORDER — MIDAZOLAM HYDROCHLORIDE 1 MG/ML
INJECTION INTRAMUSCULAR; INTRAVENOUS
Status: DISCONTINUED | OUTPATIENT
Start: 2025-05-21 | End: 2025-05-21

## 2025-05-21 RX ORDER — LISINOPRIL 20 MG/1
20 TABLET ORAL NIGHTLY
Status: DISCONTINUED | OUTPATIENT
Start: 2025-05-21 | End: 2025-05-22 | Stop reason: HOSPADM

## 2025-05-21 RX ORDER — TRIAMCINOLONE ACETONIDE 1 MG/G
CREAM TOPICAL 2 TIMES DAILY
Status: DISCONTINUED | OUTPATIENT
Start: 2025-05-21 | End: 2025-05-22 | Stop reason: HOSPADM

## 2025-05-21 RX ORDER — SUCRALFATE 1 G/1
1 TABLET ORAL 4 TIMES DAILY PRN
Status: DISCONTINUED | OUTPATIENT
Start: 2025-05-21 | End: 2025-05-22 | Stop reason: HOSPADM

## 2025-05-21 RX ORDER — ALBUTEROL SULFATE 90 UG/1
2 INHALANT RESPIRATORY (INHALATION) EVERY 4 HOURS PRN
Status: DISCONTINUED | OUTPATIENT
Start: 2025-05-21 | End: 2025-05-21

## 2025-05-21 RX ORDER — ONDANSETRON HYDROCHLORIDE 2 MG/ML
4 INJECTION, SOLUTION INTRAVENOUS EVERY 12 HOURS PRN
Status: DISCONTINUED | OUTPATIENT
Start: 2025-05-21 | End: 2025-05-22 | Stop reason: HOSPADM

## 2025-05-21 RX ORDER — FLUTICASONE FUROATE AND VILANTEROL 100; 25 UG/1; UG/1
1 POWDER RESPIRATORY (INHALATION) DAILY
Status: DISCONTINUED | OUTPATIENT
Start: 2025-05-21 | End: 2025-05-21

## 2025-05-21 RX ORDER — HYDROCODONE BITARTRATE AND ACETAMINOPHEN 5; 325 MG/1; MG/1
1 TABLET ORAL EVERY 4 HOURS PRN
Status: DISCONTINUED | OUTPATIENT
Start: 2025-05-21 | End: 2025-05-22 | Stop reason: HOSPADM

## 2025-05-21 RX ORDER — PHENYLEPHRINE HCL IN 0.9% NACL 20MG/250ML
0-5 PLASTIC BAG, INJECTION (ML) INTRAVENOUS CONTINUOUS
Status: DISCONTINUED | OUTPATIENT
Start: 2025-05-21 | End: 2025-05-22 | Stop reason: HOSPADM

## 2025-05-21 RX ORDER — PROTAMINE SULFATE 10 MG/ML
INJECTION, SOLUTION INTRAVENOUS
Status: DISCONTINUED | OUTPATIENT
Start: 2025-05-21 | End: 2025-05-21

## 2025-05-21 RX ORDER — OXYCODONE HYDROCHLORIDE 5 MG/1
5 TABLET ORAL
Status: DISCONTINUED | OUTPATIENT
Start: 2025-05-21 | End: 2025-05-21 | Stop reason: HOSPADM

## 2025-05-21 RX ORDER — CEFAZOLIN 2 G/1
2 INJECTION, POWDER, FOR SOLUTION INTRAMUSCULAR; INTRAVENOUS ONCE
Status: COMPLETED | OUTPATIENT
Start: 2025-05-21 | End: 2025-05-21

## 2025-05-21 RX ORDER — ATENOLOL 50 MG/1
100 TABLET ORAL DAILY
Status: DISCONTINUED | OUTPATIENT
Start: 2025-05-21 | End: 2025-05-22 | Stop reason: HOSPADM

## 2025-05-21 RX ORDER — FAMOTIDINE 10 MG/ML
INJECTION, SOLUTION INTRAVENOUS
Status: DISCONTINUED | OUTPATIENT
Start: 2025-05-21 | End: 2025-05-21

## 2025-05-21 RX ORDER — IPRATROPIUM BROMIDE 0.5 MG/2.5ML
0.5 SOLUTION RESPIRATORY (INHALATION) EVERY 6 HOURS
Status: DISCONTINUED | OUTPATIENT
Start: 2025-05-21 | End: 2025-05-22 | Stop reason: HOSPADM

## 2025-05-21 RX ORDER — MONTELUKAST SODIUM 10 MG/1
10 TABLET ORAL DAILY
Status: DISCONTINUED | OUTPATIENT
Start: 2025-05-21 | End: 2025-05-22 | Stop reason: HOSPADM

## 2025-05-21 RX ORDER — LIDOCAINE HYDROCHLORIDE 20 MG/ML
INJECTION, SOLUTION EPIDURAL; INFILTRATION; INTRACAUDAL; PERINEURAL
Status: DISCONTINUED | OUTPATIENT
Start: 2025-05-21 | End: 2025-05-21

## 2025-05-21 RX ORDER — NAPROXEN SODIUM 220 MG/1
81 TABLET, FILM COATED ORAL DAILY
Status: DISCONTINUED | OUTPATIENT
Start: 2025-05-22 | End: 2025-05-22 | Stop reason: HOSPADM

## 2025-05-21 RX ORDER — PANTOPRAZOLE SODIUM 40 MG/1
40 TABLET, DELAYED RELEASE ORAL
Status: DISCONTINUED | OUTPATIENT
Start: 2025-05-21 | End: 2025-05-22 | Stop reason: HOSPADM

## 2025-05-21 RX ORDER — ACETAMINOPHEN 10 MG/ML
INJECTION, SOLUTION INTRAVENOUS
Status: DISCONTINUED | OUTPATIENT
Start: 2025-05-21 | End: 2025-05-21

## 2025-05-21 RX ORDER — ROCURONIUM BROMIDE 10 MG/ML
INJECTION, SOLUTION INTRAVENOUS
Status: DISCONTINUED | OUTPATIENT
Start: 2025-05-21 | End: 2025-05-21

## 2025-05-21 RX ORDER — ASPIRIN 325 MG
325 TABLET ORAL DAILY
Status: DISCONTINUED | OUTPATIENT
Start: 2025-05-21 | End: 2025-05-21

## 2025-05-21 RX ORDER — ROFLUMILAST 500 UG/1
500 TABLET ORAL DAILY PRN
Status: DISCONTINUED | OUTPATIENT
Start: 2025-05-21 | End: 2025-05-22 | Stop reason: HOSPADM

## 2025-05-21 RX ORDER — PROPOFOL 10 MG/ML
VIAL (ML) INTRAVENOUS
Status: DISCONTINUED | OUTPATIENT
Start: 2025-05-21 | End: 2025-05-21

## 2025-05-21 RX ORDER — FENTANYL CITRATE 50 UG/ML
INJECTION, SOLUTION INTRAMUSCULAR; INTRAVENOUS
Status: DISCONTINUED | OUTPATIENT
Start: 2025-05-21 | End: 2025-05-21

## 2025-05-21 RX ORDER — MUPIROCIN 20 MG/G
OINTMENT TOPICAL 2 TIMES DAILY
Status: DISCONTINUED | OUTPATIENT
Start: 2025-05-21 | End: 2025-05-22 | Stop reason: HOSPADM

## 2025-05-21 RX ORDER — FLUTICASONE PROPIONATE 50 MCG
1 SPRAY, SUSPENSION (ML) NASAL DAILY
Status: DISCONTINUED | OUTPATIENT
Start: 2025-05-21 | End: 2025-05-22 | Stop reason: HOSPADM

## 2025-05-21 RX ORDER — HEPARIN SODIUM 5000 [USP'U]/ML
INJECTION, SOLUTION INTRAVENOUS; SUBCUTANEOUS
Status: DISCONTINUED | OUTPATIENT
Start: 2025-05-21 | End: 2025-05-21 | Stop reason: HOSPADM

## 2025-05-21 RX ORDER — ONDANSETRON HYDROCHLORIDE 2 MG/ML
INJECTION, SOLUTION INTRAMUSCULAR; INTRAVENOUS
Status: DISCONTINUED | OUTPATIENT
Start: 2025-05-21 | End: 2025-05-21

## 2025-05-21 RX ORDER — CETIRIZINE HYDROCHLORIDE 10 MG/1
10 TABLET ORAL DAILY
Status: DISCONTINUED | OUTPATIENT
Start: 2025-05-21 | End: 2025-05-22 | Stop reason: HOSPADM

## 2025-05-21 RX ORDER — LIDOCAINE HYDROCHLORIDE 10 MG/ML
INJECTION, SOLUTION INFILTRATION; PERINEURAL
Status: DISCONTINUED | OUTPATIENT
Start: 2025-05-21 | End: 2025-05-21 | Stop reason: HOSPADM

## 2025-05-21 RX ORDER — SUCCINYLCHOLINE CHLORIDE 20 MG/ML
INJECTION INTRAMUSCULAR; INTRAVENOUS
Status: DISCONTINUED | OUTPATIENT
Start: 2025-05-21 | End: 2025-05-21

## 2025-05-21 RX ORDER — CEFAZOLIN SODIUM 1 G/3ML
INJECTION, POWDER, FOR SOLUTION INTRAMUSCULAR; INTRAVENOUS
Status: DISCONTINUED | OUTPATIENT
Start: 2025-05-21 | End: 2025-05-21 | Stop reason: HOSPADM

## 2025-05-21 RX ORDER — HYDROMORPHONE HYDROCHLORIDE 1 MG/ML
1 INJECTION, SOLUTION INTRAMUSCULAR; INTRAVENOUS; SUBCUTANEOUS EVERY 4 HOURS PRN
Status: DISCONTINUED | OUTPATIENT
Start: 2025-05-21 | End: 2025-05-22 | Stop reason: HOSPADM

## 2025-05-21 RX ORDER — CEFAZOLIN 2 G/1
2 INJECTION, POWDER, FOR SOLUTION INTRAMUSCULAR; INTRAVENOUS
Status: COMPLETED | OUTPATIENT
Start: 2025-05-21 | End: 2025-05-21

## 2025-05-21 RX ORDER — HEPARIN SODIUM 10000 [USP'U]/ML
INJECTION, SOLUTION INTRAVENOUS; SUBCUTANEOUS
Status: DISCONTINUED | OUTPATIENT
Start: 2025-05-21 | End: 2025-05-21

## 2025-05-21 RX ORDER — ARFORMOTEROL TARTRATE 15 UG/2ML
15 SOLUTION RESPIRATORY (INHALATION) 2 TIMES DAILY
Status: DISCONTINUED | OUTPATIENT
Start: 2025-05-21 | End: 2025-05-22 | Stop reason: HOSPADM

## 2025-05-21 RX ORDER — BENZONATATE 100 MG/1
100 CAPSULE ORAL 3 TIMES DAILY PRN
Status: DISCONTINUED | OUTPATIENT
Start: 2025-05-21 | End: 2025-05-22 | Stop reason: HOSPADM

## 2025-05-21 RX ADMIN — FENTANYL CITRATE 100 MCG: 0.05 INJECTION, SOLUTION INTRAMUSCULAR; INTRAVENOUS at 07:05

## 2025-05-21 RX ADMIN — ATENOLOL 100 MG: 50 TABLET ORAL at 01:05

## 2025-05-21 RX ADMIN — HEPARIN SODIUM 2500 UNITS: 10000 INJECTION, SOLUTION INTRAVENOUS; SUBCUTANEOUS at 08:05

## 2025-05-21 RX ADMIN — OXYCODONE HYDROCHLORIDE 5 MG: 5 TABLET ORAL at 10:05

## 2025-05-21 RX ADMIN — MUPIROCIN 1 G: 20 OINTMENT TOPICAL at 08:05

## 2025-05-21 RX ADMIN — FLUTICASONE PROPIONATE 50 MCG: 50 SPRAY, METERED NASAL at 04:05

## 2025-05-21 RX ADMIN — ROCURONIUM BROMIDE 30 MG: 10 INJECTION, SOLUTION INTRAVENOUS at 09:05

## 2025-05-21 RX ADMIN — HYDROCODONE BITARTRATE AND ACETAMINOPHEN 1 TABLET: 5; 325 TABLET ORAL at 01:05

## 2025-05-21 RX ADMIN — PHENYLEPHRINE HYDROCHLORIDE 0.05 MCG/KG/MIN: 10 INJECTION INTRAVENOUS at 08:05

## 2025-05-21 RX ADMIN — CLEVIPIDINE 4 MG/HR: 0.5 EMULSION INTRAVENOUS at 11:05

## 2025-05-21 RX ADMIN — HYDROMORPHONE HYDROCHLORIDE 0.2 MG: 1 INJECTION, SOLUTION INTRAMUSCULAR; INTRAVENOUS; SUBCUTANEOUS at 11:05

## 2025-05-21 RX ADMIN — SODIUM CHLORIDE, SODIUM LACTATE, POTASSIUM CHLORIDE, AND CALCIUM CHLORIDE: .6; .31; .03; .02 INJECTION, SOLUTION INTRAVENOUS at 09:05

## 2025-05-21 RX ADMIN — ACETAMINOPHEN 1000 MG: 10 INJECTION, SOLUTION INTRAVENOUS at 09:05

## 2025-05-21 RX ADMIN — CLEVIPIDINE 2 MG/HR: 0.5 EMULSION INTRAVENOUS at 01:05

## 2025-05-21 RX ADMIN — Medication 120 MG: at 07:05

## 2025-05-21 RX ADMIN — FAMOTIDINE 20 MG: 10 INJECTION, SOLUTION INTRAVENOUS at 07:05

## 2025-05-21 RX ADMIN — LISINOPRIL 20 MG: 20 TABLET ORAL at 08:05

## 2025-05-21 RX ADMIN — ROCURONIUM BROMIDE 5 MG: 10 INJECTION, SOLUTION INTRAVENOUS at 07:05

## 2025-05-21 RX ADMIN — HYDRALAZINE HYDROCHLORIDE 25 MG: 25 TABLET ORAL at 08:05

## 2025-05-21 RX ADMIN — CEFAZOLIN 2 G: 2 INJECTION, POWDER, FOR SOLUTION INTRAMUSCULAR; INTRAVENOUS at 07:05

## 2025-05-21 RX ADMIN — ONDANSETRON 4 MG: 2 INJECTION INTRAMUSCULAR; INTRAVENOUS at 06:05

## 2025-05-21 RX ADMIN — SODIUM CHLORIDE, SODIUM LACTATE, POTASSIUM CHLORIDE, AND CALCIUM CHLORIDE: .6; .31; .03; .02 INJECTION, SOLUTION INTRAVENOUS at 07:05

## 2025-05-21 RX ADMIN — ASPIRIN 325 MG ORAL TABLET 325 MG: 325 PILL ORAL at 10:05

## 2025-05-21 RX ADMIN — MUPIROCIN 1 G: 20 OINTMENT TOPICAL at 01:05

## 2025-05-21 RX ADMIN — MONTELUKAST 10 MG: 10 TABLET, FILM COATED ORAL at 01:05

## 2025-05-21 RX ADMIN — HYDROMORPHONE HYDROCHLORIDE 0.2 MG: 1 INJECTION, SOLUTION INTRAMUSCULAR; INTRAVENOUS; SUBCUTANEOUS at 10:05

## 2025-05-21 RX ADMIN — LIDOCAINE HYDROCHLORIDE 50 MG: 20 INJECTION, SOLUTION INTRAVENOUS at 07:05

## 2025-05-21 RX ADMIN — PROPOFOL 200 MG: 10 INJECTION, EMULSION INTRAVENOUS at 07:05

## 2025-05-21 RX ADMIN — ROCURONIUM BROMIDE 20 MG: 10 INJECTION, SOLUTION INTRAVENOUS at 09:05

## 2025-05-21 RX ADMIN — ROCURONIUM BROMIDE 30 MG: 10 INJECTION, SOLUTION INTRAVENOUS at 07:05

## 2025-05-21 RX ADMIN — HEPARIN SODIUM 10000 UNITS: 10000 INJECTION, SOLUTION INTRAVENOUS; SUBCUTANEOUS at 08:05

## 2025-05-21 RX ADMIN — PANTOPRAZOLE SODIUM 40 MG: 40 TABLET, DELAYED RELEASE ORAL at 01:05

## 2025-05-21 RX ADMIN — HYDROMORPHONE HYDROCHLORIDE 1 MG: 1 INJECTION, SOLUTION INTRAMUSCULAR; INTRAVENOUS; SUBCUTANEOUS at 05:05

## 2025-05-21 RX ADMIN — PROTAMINE SULFATE 75 MG: 10 INJECTION, SOLUTION INTRAVENOUS at 09:05

## 2025-05-21 RX ADMIN — SODIUM CHLORIDE: 9 INJECTION, SOLUTION INTRAVENOUS at 01:05

## 2025-05-21 RX ADMIN — CEFAZOLIN 2 G: 2 INJECTION, POWDER, FOR SOLUTION INTRAMUSCULAR; INTRAVENOUS at 04:05

## 2025-05-21 RX ADMIN — CEFAZOLIN 2 G: 2 INJECTION, POWDER, FOR SOLUTION INTRAMUSCULAR; INTRAVENOUS at 11:05

## 2025-05-21 RX ADMIN — CLEVIPIDINE 4 MG/HR: 0.5 EMULSION INTRAVENOUS at 08:05

## 2025-05-21 RX ADMIN — HYDRALAZINE HYDROCHLORIDE 25 MG: 25 TABLET ORAL at 01:05

## 2025-05-21 RX ADMIN — SUGAMMADEX 200 MG: 100 INJECTION, SOLUTION INTRAVENOUS at 10:05

## 2025-05-21 RX ADMIN — CLEVIPIDINE 1 MG/HR: 0.5 EMULSION INTRAVENOUS at 07:05

## 2025-05-21 RX ADMIN — ONDANSETRON 4 MG: 2 INJECTION INTRAMUSCULAR; INTRAVENOUS at 07:05

## 2025-05-21 RX ADMIN — CETIRIZINE HYDROCHLORIDE 10 MG: 10 TABLET, FILM COATED ORAL at 01:05

## 2025-05-21 RX ADMIN — MIDAZOLAM HYDROCHLORIDE 2 MG: 1 INJECTION, SOLUTION INTRAMUSCULAR; INTRAVENOUS at 07:05

## 2025-05-21 NOTE — ANESTHESIA POSTPROCEDURE EVALUATION
Anesthesia Post Evaluation    Patient: Darien Le    Procedure(s) Performed: Procedure(s) (LRB):  ENDARTERECTOMY, CAROTID (Left)    Final Anesthesia Type: general      Patient location during evaluation: PACU  Patient participation: Yes- Able to Participate  Level of consciousness: awake and alert, oriented and awake  Post-procedure vital signs: reviewed and stable  Pain management: adequate  Airway patency: patent    PONV status at discharge: No PONV  Anesthetic complications: no      Cardiovascular status: blood pressure returned to baseline, hemodynamically stable and stable  Respiratory status: unassisted, spontaneous ventilation and nasal cannula  Hydration status: euvolemic  Follow-up not needed.  Comments: Patient moves all extremities              Vitals Value Taken Time   /61 05/21/25 12:00   Temp 36.7 °C (98.1 °F) 05/21/25 11:30   Pulse 63 05/21/25 12:05   Resp 16 05/21/25 12:05   SpO2 98 % 05/21/25 12:05   Vitals shown include unfiled device data.      Event Time   Out of Recovery 12:06:57         Pain/Mary Anne Score: Pain Rating Prior to Med Admin: 6 (5/21/2025 11:30 AM)  Mary Anne Score: 10 (5/21/2025 12:00 PM)

## 2025-05-21 NOTE — ANESTHESIA PROCEDURE NOTES
Intubation    Date/Time: 5/21/2025 7:26 AM    Performed by: Sandra Estrada  Authorized by: Nilesh Perez MD    Intubation:     Induction:  Intravenous    Intubated:  Postinduction    Mask Ventilation:  Easy mask    Attempts:  1    Attempted By:  CRNA    Method of Intubation:  Video laryngoscopy    Blade:  Beckford 3    Laryngeal View Grade: Grade I - full view of cords      Difficult Airway Encountered?: No      Complications:  None    Airway Device:  Oral endotracheal tube    Airway Device Size:  7.5    Style/Cuff Inflation:  Cuffed    Inflation Amount (mL):  7    Tube secured:  22    Secured at:  The teeth    Placement Verified By:  Capnometry and Fiber optic visualization    Complicating Factors:  None    Findings Post-Intubation:  BS equal bilateral and atraumatic/condition of teeth unchanged

## 2025-05-21 NOTE — PLAN OF CARE
[facility]  Initial Discharge Assessment       Primary Care Provider: Rahel Land NP    Admission Diagnosis: Left carotid stenosis [I65.22]  Pre-op testing [Z01.818]    Admission Date: 5/21/2025  Expected Discharge Date:     Transition of Care Barriers: (P) None     met with Pt at bedside to complete an initial discharge assessment. Pt AAOx4s.  Demographics, PCP, and insurance verified. Pt has No dialysis. Pt reports ability to complete ADLs without assistance. Pt verbalized plan to discharge home via family transport. Pt has no other needs to be addressed at this time.     Payor: [employer] MANAGED MEDICARE / Plan: [employer] GEOLID HMO PPO SPECIAL NEEDS / Product Type: Medicare Advantage /     Extended Emergency Contact Information  Primary Emergency Contact: Sarkis Le  Address: [address] [place] [address]  Home Phone: [phone]  Mobile Phone: [phone]  Relation: Spouse    Discharge Plan A: (P) Home with family  Discharge Plan B: (P) Home with family      [facility] [place] - [address] [id number]  Phone: [phone] Fax: [phone]      Initial Assessment (most recent)       Adult Discharge Assessment - 05/21/25 1442          Discharge Assessment    Assessment Type Discharge Planning Assessment     Confirmed/corrected address, phone number and insurance Yes     Confirmed Demographics Correct on Facesheet     Source of Information patient     People in Home spouse     Name(s) of People in Home Sarkis Le (Spouse)  [phone] (Mobile)     Facility Arrived From: Home     Do you expect to return to your current living situation? Yes     Do you have help at home or someone to help you manage your care at home? Yes   Sarkis Le (Spouse)  [phone] (Mobile)    Prior to hospitilization cognitive status: Unable to Assess     Current cognitive status: Alert/Oriented     Walking or Climbing  Stairs Difficulty no     Dressing/Bathing Difficulty no     Home Accessibility not wheelchair accessible     Home Layout Able to live on 1st floor     Equipment Currently Used at Home oxygen   2L (unknown company)    Readmission within 30 days? No (P)      Patient currently being followed by outpatient case management? No (P)      Do you currently have service(s) that help you manage your care at home? No (P)      Do you take prescription medications? Yes (P)      Do you have prescription coverage? Yes (P)      Do you have any problems affording any of your prescribed medications? No (P)      Is the patient taking medications as prescribed? yes (P)      Who is going to help you get home at discharge? Sarkis Le (Spouse)  [phone] (Mobile) (P)      How do you get to doctors appointments? car, drives self (P)      Are you on dialysis? No (P)      Do you take coumadin? No (P)      Discharge Plan A Home with family (P)      Discharge Plan B Home with family (P)      DME Needed Upon Discharge  none (P)      Discharge Plan discussed with: Patient (P)      Transition of Care Barriers None (P)

## 2025-05-21 NOTE — OP NOTE
[facility]  Surgery Department  Operative Note    SUMMARY     Date of Procedure: 5/21/2025     Procedure: Procedure(s) (LRB):  ENDARTERECTOMY, CAROTID (Left)     Surgeons and Role:     * John Ewing MD - Primary    Assisting Surgeon: None    Pre-Operative Diagnosis: Left carotid stenosis [I65.22]    Post-Operative Diagnosis: Post-Op Diagnosis Codes:     * Left carotid stenosis [I65.22]    Anesthesia: General    Operative Findings (including complications, if any):  Recurrent left internal carotid artery stenosis after previous endarterectomy    Description of Technical Procedures:  Reoperative left carotid endarterectomy.  Vein patch angioplasty with left greater saphenous vein from the groin.  12. Hosmer shunt during EEG monitoring.    Skin incision was made anterior to the sternocleidomastoid in the previous left carotid incision.  Dissection down through subcutaneous tissue and platysma was achieved hemo cautery.  There was significant subcutaneous and deep adhesions and the sternocleidomastoid muscle was dissected away along its anterior border.  The jugular vein was identified and the common carotid was dissected away from the jugular vein proximally and we were able to get to the carotid artery and [place] and dissected out the common carotid and looped it proximally leaving the vagus nerve posterior laterally.  We then dissected out the internal carotid distally and identified the hypoglossal nerve which was actually above the previous dissection area and the internal carotid artery was looped beyond the pre-existing patch.  The external carotid which appeared to be occluded on the angiogram was also looped.  Patient received 7500 units of IV heparin while we harvested the greater saphenous vein from the left groin.  After the vein was harvested proximal and distal saphenous vein was ligated and divided.  We then changed our gloves and went back to the neck.  After elevating the  blood pressure we clamped the internal carotid 1st and then the common and external carotid arteries were occluded.  Arteriotomy was made with the 11 blade on the pre-existing Dacron patch and opened longitudinally across the distal aspect of the patch and proximally across the patch onto the common carotid artery.  There was a area of severe stenosis at the distal patch edge of very soft fibrotic and thrombotic debris.  We are able to form a repeat endarterectomy.  About 15 minutes into our endarterectomy the EEG became diminished and we had to place a 12. Lamont shunt distally and then proximally.  We also elevated the blood pressure and actually had pulsatile backbleeding.  Once the shunt was in place the EEG returned to baseline.  After endarterectomy and getting a good endpoint with proximally and distally the vein was opened longitudinally valves were excised vein patch angioplasty was performed with 7 0 and 6 0 Prolene.  Prior to completion of the closure the shunt was removed the vessels lot flush and backbleed the lumen was irrigated out with hep saline.  The closure was completed the internal was allowed to backbleed 1st and then the common carotid was opened.  The external carotid was known to be occluded.  There were no changes on EEG and the patient received 75 mg of protamine.  We obtained hemostasis but there was some oozing from the scar tissue so we did place a KAREN drain and brought the drain out proximally.  Wounds irrigated out we then closed the deep layer 3-0 Vicryl in 2 layers skin was closed with 4-0 Monocryl in the neck and in the groin.    Significant Surgical Tasks Conducted by the Assistant(s), if Applicable:  Assisted with all dissection and patch closure no resident available.    Estimated Blood Loss (EBL): * No values recorded between 5/21/2025  7:21 AM and 5/21/2025 10:31 AM *           Implants: * No implants in log *    Specimens:   Specimen (24h ago, onward)       Start     Ordered     05/21/25 0927  Specimen to Pathology - Surgery  Once        Comments: Pre-op Diagnosis: Left carotid stenosis [I65.22]Procedure(s):RE-DO ENDARTERECTOMY, CAROTID Number of specimens: 1Name of specimens: Left Carotid Plaquue     Question:  Release to patient  Answer:  Immediate    05/21/25 0926                   * No specimens in log *           Condition: Good    Disposition: ICU - extubated and stable.    Attestation: Op Note Attestation: I was physically present and scrubbed for the entire procedure.

## 2025-05-21 NOTE — PROGRESS NOTES
Automatic Inhaler to Nebulizer Interchange    fluticasone/umeclidinium/vilanterol (Trelegy) 100 mcg/ 62.5 mcg/ 25 mcg changed to budesonide 0.5 mg twice daily AND ipratropium 0.5 mg every 6 hour scheduled AND arformoterol 15 mcg twice daily per [facility] Automatic Therapeutic Substitutions Protocol.    Please contact pharmacy at extension [phone] with any questions.     Thank you,   Markell Burns

## 2025-05-21 NOTE — PLAN OF CARE
Problem: Adult Inpatient Plan of Care  Goal: Plan of Care Review  Outcome: Progressing  Goal: Patient-Specific Goal (Individualized)  Outcome: Progressing  Goal: Absence of Hospital-Acquired Illness or Injury  Outcome: Progressing  Goal: Optimal Comfort and Wellbeing  Outcome: Progressing  Goal: Readiness for Transition of Care  Outcome: Progressing     Problem: Wound  Goal: Optimal Coping  Outcome: Progressing  Goal: Absence of Infection Signs and Symptoms  Outcome: Progressing  Goal: Optimal Pain Control and Function  Outcome: Progressing

## 2025-05-21 NOTE — TRANSFER OF CARE
Anesthesia Transfer of Care Note    Patient: Darien Le    Procedure(s) Performed: Procedure(s) (LRB):  RE-DO ENDARTERECTOMY, CAROTID (Left)    Patient location: PACU    Anesthesia Type: general    Transport from OR: Transported from OR on 2-3 L/min O2 by NC with adequate spontaneous ventilation    Post pain: adequate analgesia    Post assessment: no apparent anesthetic complications and tolerated procedure well    Post vital signs: stable    Level of consciousness: awake and alert    Nausea/Vomiting: no nausea/vomiting    Complications: none    Transfer of care protocol was followed      Last vitals: Visit Vitals  BP (!) 155/74   Pulse 75   Temp 36.6 °C (97.9 °F) (Oral)   Resp 18   SpO2 98%

## 2025-05-21 NOTE — ANESTHESIA PROCEDURE NOTES
Arterial    Diagnosis: Left carotid artery stenosis    Patient location during procedure: done in OR  Timeout: 5/21/2025 7:25 AM  Procedure end time: 5/21/2025 7:30 AM    Staffing  Authorizing Provider: Nilesh Perez MD  Performing Provider: Nilesh Perez MD    Staffing  Performed by: Nilesh Perez MD  Authorized by: Nilesh Perez MD    Anesthesiologist was present at the time of the procedure.    Preanesthetic Checklist  Completed: patient identified, IV checked, site marked, risks and benefits discussed, surgical consent, monitors and equipment checked, pre-op evaluation, timeout performed and anesthesia consent givenArterial  Skin Prep: chlorhexidine gluconate and isopropyl alcohol  Orientation: right  Location: radial    Catheter Size: 20 G  Catheter placement by Ultrasound guidance. Heme positive aspiration all ports.   Vessel Caliber: medium, patent, compressibility normal  Needle advanced into vessel with real time Ultrasound guidance.  Sterile sheath used.Insertion Attempts: 1  Assessment  Dressing: secured with tape and tegaderm, secured with tape, tegaderm, sutured in place and taped and steri-strips  Patient: Tolerated well

## 2025-05-22 VITALS
HEART RATE: 68 BPM | BODY MASS INDEX: 27.23 KG/M2 | HEIGHT: 67 IN | DIASTOLIC BLOOD PRESSURE: 82 MMHG | WEIGHT: 173.5 LBS | TEMPERATURE: 98 F | OXYGEN SATURATION: 93 % | SYSTOLIC BLOOD PRESSURE: 143 MMHG | RESPIRATION RATE: 18 BRPM

## 2025-05-22 PROCEDURE — 25000003 PHARM REV CODE 250: Performed by: SURGERY

## 2025-05-22 PROCEDURE — 27000221 HC OXYGEN, UP TO 24 HOURS

## 2025-05-22 PROCEDURE — 94640 AIRWAY INHALATION TREATMENT: CPT

## 2025-05-22 PROCEDURE — 99900035 HC TECH TIME PER 15 MIN (STAT)

## 2025-05-22 PROCEDURE — 99900031 HC PATIENT EDUCATION (STAT)

## 2025-05-22 PROCEDURE — C9248 INJ, CLEVIDIPINE BUTYRATE: HCPCS | Mod: JZ,TB,UD | Performed by: SURGERY

## 2025-05-22 PROCEDURE — 63600175 PHARM REV CODE 636 W HCPCS: Mod: JZ | Performed by: SURGERY

## 2025-05-22 PROCEDURE — 94761 N-INVAS EAR/PLS OXIMETRY MLT: CPT

## 2025-05-22 PROCEDURE — 25000242 PHARM REV CODE 250 ALT 637 W/ HCPCS: Performed by: SURGERY

## 2025-05-22 RX ORDER — HYDROCODONE BITARTRATE AND ACETAMINOPHEN 5; 325 MG/1; MG/1
1 TABLET ORAL EVERY 4 HOURS PRN
Qty: 25 TABLET | Refills: 0 | Status: SHIPPED | OUTPATIENT
Start: 2025-05-22

## 2025-05-22 RX ADMIN — BUDESONIDE INHALATION 0.5 MG: 0.5 SUSPENSION RESPIRATORY (INHALATION) at 07:05

## 2025-05-22 RX ADMIN — MUPIROCIN 1 G: 20 OINTMENT TOPICAL at 08:05

## 2025-05-22 RX ADMIN — CETIRIZINE HYDROCHLORIDE 10 MG: 10 TABLET, FILM COATED ORAL at 08:05

## 2025-05-22 RX ADMIN — MONTELUKAST 10 MG: 10 TABLET, FILM COATED ORAL at 08:05

## 2025-05-22 RX ADMIN — HYDROCODONE BITARTRATE AND ACETAMINOPHEN 1 TABLET: 5; 325 TABLET ORAL at 03:05

## 2025-05-22 RX ADMIN — HYDROMORPHONE HYDROCHLORIDE 1 MG: 1 INJECTION, SOLUTION INTRAMUSCULAR; INTRAVENOUS; SUBCUTANEOUS at 12:05

## 2025-05-22 RX ADMIN — AMLODIPINE BESYLATE 10 MG: 5 TABLET ORAL at 08:05

## 2025-05-22 RX ADMIN — CLEVIPIDINE 1 MG/HR: 0.5 EMULSION INTRAVENOUS at 03:05

## 2025-05-22 RX ADMIN — HYDROMORPHONE HYDROCHLORIDE 1 MG: 1 INJECTION, SOLUTION INTRAMUSCULAR; INTRAVENOUS; SUBCUTANEOUS at 09:05

## 2025-05-22 RX ADMIN — IPRATROPIUM BROMIDE 0.5 MG: 0.5 SOLUTION RESPIRATORY (INHALATION) at 07:05

## 2025-05-22 RX ADMIN — HYDROMORPHONE HYDROCHLORIDE 1 MG: 1 INJECTION, SOLUTION INTRAMUSCULAR; INTRAVENOUS; SUBCUTANEOUS at 05:05

## 2025-05-22 RX ADMIN — ARFORMOTEROL TARTRATE 15 MCG: 15 SOLUTION RESPIRATORY (INHALATION) at 07:05

## 2025-05-22 RX ADMIN — ASPIRIN 81 MG CHEWABLE TABLET 81 MG: 81 TABLET CHEWABLE at 08:05

## 2025-05-22 RX ADMIN — ATENOLOL 100 MG: 50 TABLET ORAL at 08:05

## 2025-05-22 RX ADMIN — PANTOPRAZOLE SODIUM 40 MG: 40 TABLET, DELAYED RELEASE ORAL at 05:05

## 2025-05-22 RX ADMIN — HYDRALAZINE HYDROCHLORIDE 25 MG: 25 TABLET ORAL at 08:05

## 2025-05-22 NOTE — CARE UPDATE
05/21/25 1940   Patient Assessment/Suction   Level of Consciousness (AVPU) alert   Respiratory Effort Normal;Unlabored   Expansion/Accessory Muscles/Retractions no use of accessory muscles   All Lung Fields Breath Sounds equal bilaterally;diminished;coarse   Rhythm/Pattern, Respiratory unlabored   Cough Frequency frequent   Cough Type good;loose;nonproductive   Skin Integrity   $ Wound Care Tech Time 15 min   Area Observed Left;Right;Behind ear;Nares   Skin Appearance without discoloration   PRE-TX-O2   Device (Oxygen Therapy) nasal cannula   Flow (L/min) (Oxygen Therapy) 2   SpO2 96 %   Pulse Oximetry Type Continuous   $ Pulse Oximetry - Multiple Charge Pulse Oximetry - Multiple   Pulse (!) 57   Resp 13   /72   Aerosol Therapy   $ Aerosol Therapy Charges Refused  (Pt refused tx, states it will keep him awake.)   Education   $ Education Bronchodilator;15 min   Respiratory Evaluation   $ Care Plan Tech Time 15 min

## 2025-05-22 NOTE — PLAN OF CARE
Problem: Adult Inpatient Plan of Care  Goal: Plan of Care Review  Outcome: Progressing  Goal: Optimal Comfort and Wellbeing  Outcome: Progressing     Problem: Wound  Goal: Optimal Coping  Outcome: Progressing  Goal: Optimal Functional Ability  Outcome: Progressing  Goal: Absence of Infection Signs and Symptoms  Outcome: Progressing  Goal: Improved Oral Intake  Outcome: Progressing  Goal: Optimal Pain Control and Function  Outcome: Progressing  Goal: Skin Health and Integrity  Outcome: Progressing  Goal: Optimal Wound Healing  Outcome: Progressing

## 2025-05-22 NOTE — NURSING
Patient discharged home to self care. Discharge instructions reviewed at bedside with patient and spouse. KAREN train removed at bedside by nurse per MD Kaylin. IVs and continuous monitoring removed. Patient instructed to call Kaylin's office to make follow up appointment.     Patient wheeled downstairs to main entrance where Spouse picked up patient in personal vehicle. VSS and WDL at time of discharge. All questions answered by nurse.

## 2025-05-22 NOTE — RESPIRATORY THERAPY
05/22/25 0724   Patient Assessment/Suction   Level of Consciousness (AVPU) alert   Expansion/Accessory Muscles/Retractions no use of accessory muscles;no retractions;expansion symmetric   All Lung Fields Breath Sounds diminished   TAMRA Breath Sounds diminished   LLL Breath Sounds coarse   RUL Breath Sounds diminished   RML Breath Sounds coarse   RLL Breath Sounds coarse   Rhythm/Pattern, Respiratory unlabored;pattern regular;depth regular;no shortness of breath reported   Cough Frequency infrequent   Cough Type congested   PRE-TX-O2   Device (Oxygen Therapy) nasal cannula   $ Is the patient on Low Flow Oxygen? Yes   Flow (L/min) (Oxygen Therapy) 2   SpO2 98 %   Pulse Oximetry Type Continuous   $ Pulse Oximetry - Multiple Charge Pulse Oximetry - Multiple   Pulse 65   Resp 16   Aerosol Therapy   $ Aerosol Therapy Charges Aerosol Treatment   Daily Review of Necessity (SVN) completed   Respiratory Treatment Status (SVN) given   Treatment Route (SVN) mask;oxygen   Patient Position sitting on edge of bed   Post Treatment Assessment (SVN) increased aeration   Signs of Intolerance (SVN) none   Breath Sounds Post-Respiratory Treatment   Throughout All Fields Post-Treatment All Fields   Throughout All Fields Post-Treatment aeration increased   Post-treatment Heart Rate (beats/min) 62   Post-treatment Resp Rate (breaths/min) 15   Education   $ Education Bronchodilator;15 min;Oxygen

## 2025-05-22 NOTE — PLAN OF CARE
Pt discharged from hospital before CM could set up follow up appointments.  SW called [facility] to schedule a follow up appointment - scheduled for 6/3/25.  Added to AVS.  PCP to send notifications to pt.    No other needs to be met at this time.   05/22/25 1237   Final Note   Assessment Type Final Discharge Note   Anticipated Discharge Disposition Home   What phone number can be called within the next 1-3 days to see how you are doing after discharge? [phone]   Post-Acute Status   Post-Acute Authorization Other   Other Status No Post-Acute Service Needs   Discharge Delays None known at this time

## 2025-05-29 ENCOUNTER — NURSE TRIAGE (OUTPATIENT)
Dept: ADMINISTRATIVE | Facility: CLINIC | Age: 65
End: 2025-05-29
Payer: MEDICARE

## 2025-05-29 NOTE — TELEPHONE ENCOUNTER
Pt had surgery on last Tuesday. Pt has a severe pain and neck pain that doesn't want to go away. Headache 8-10/10. Pain is constant. Pt is taking Tylenol 500 mg 4 tabs through out the day due to headache and neck pain. Informed cg that it wasn't safe for pt to have so much tylenol. Cg stated that she can't stop pt due to his pain. Care advice recommends pt discuss with MD and callback within 1 hour. Dr Ewing is not at [facility]. Cg stated she called Dr Ewings office and was told they were in clinic. Instructed cg to bring pt to er to be evaluated due to not being able to speak with MD. Cg stated she would discuss with patient.   Reason for Disposition   SEVERE post-op pain (e.g., excruciating, pain scale 8-10) that is not controlled with pain medications    Additional Information   Negative: Sounds like a life-threatening emergency to the triager   Negative: Bright red, wide-spread, sunburn-like rash   Negative: SEVERE headache (e.g., excruciating) and after spinal (epidural) anesthesia   Negative: Vomiting and persists > 4 hours   Negative: Vomiting and abdomen looks much more swollen than usual   Negative: Drinking very little and dehydration suspected (e.g., no urine > 12 hours, very dry mouth, very lightheaded)   Negative: Patient sounds very sick or weak to the triager   Negative: Sounds like a serious complication to the triager   Negative: Fever > 100.4 F (38.0 C)   Negative: Caller has URGENT question and triager unable to answer question    Protocols used: Post-Op Symptoms and Hrbrxynyc-O-YF

## 2025-05-30 ENCOUNTER — HOSPITAL ENCOUNTER (OUTPATIENT)
Dept: RADIOLOGY | Facility: HOSPITAL | Age: 65
Discharge: HOME | End: 2025-05-30
Attending: SURGERY
Payer: MEDICARE

## 2025-05-30 DIAGNOSIS — I65.22 STENOSIS OF LEFT CAROTID ARTERY: ICD-10-CM

## 2025-05-30 PROCEDURE — 70498 CT ANGIOGRAPHY NECK: CPT | Mod: TC

## 2025-05-30 PROCEDURE — 70496 CT ANGIOGRAPHY HEAD: CPT | Mod: 26,,, | Performed by: RADIOLOGY

## 2025-05-30 PROCEDURE — 70498 CT ANGIOGRAPHY NECK: CPT | Mod: 26,,, | Performed by: RADIOLOGY

## 2025-05-30 PROCEDURE — 25500020 PHARM REV CODE 255: Performed by: SURGERY

## 2025-05-30 RX ADMIN — IOHEXOL 75 ML: 350 INJECTION, SOLUTION INTRAVENOUS at 03:05

## 2025-07-03 ENCOUNTER — TELEPHONE (OUTPATIENT)
Dept: CARDIOLOGY | Facility: CLINIC | Age: 65
End: 2025-07-03
Payer: MEDICARE

## 2025-07-03 NOTE — TELEPHONE ENCOUNTER
Copied from CRM #[id number]. Topic: Medications - Medication Question  >> Jul 3, 2025 10:17 AM Lorene wrote:  Type:  Needs Medical Advice    Who Called: Phylicia, Pharmacist with [facility].  Would the patient rather a call back or a response via [facility]? Call   Best Call Back Number: [phone]  Additional Information: Phylicia states he was prescribed Atorvastatin and stopped taking it a month ago due side affects which pt did not list. Pt stated doctor is aware and wants to confirm that the provider is aware of this information. Please call back, thanks!

## (undated) DEVICE — SEE MEDLINE ITEM 156964

## (undated) DEVICE — DECANTER 6 VIAL

## (undated) DEVICE — APPLIER LIGACLIP SM 9.38IN

## (undated) DEVICE — SOL WATER STRL IRR 1000ML

## (undated) DEVICE — LOOP VESSEL MAXI RED

## (undated) DEVICE — ALCOHOL

## (undated) DEVICE — SUT 2/0 30IN SILK BLK BRAI

## (undated) DEVICE — SPONGE LAP 18X18 PREWASHED

## (undated) DEVICE — APPLIER CLIP LIAGCLIP 9.375IN

## (undated) DEVICE — GAUZE SPONGE 4X4 12PLY

## (undated) DEVICE — SUT PROLENE 6-0 24 BV-1

## (undated) DEVICE — TUBE FRAZIER 5MM 2FT SOFT TIP

## (undated) DEVICE — SUT MONOCRYL PLUS UD 3-0 27

## (undated) DEVICE — DECANTER FLUID TRNSF WHITE 9IN

## (undated) DEVICE — BLADE SCALP OPHTL BEVEL STR

## (undated) DEVICE — SPONGE DERMACEA GAUZE 4X4

## (undated) DEVICE — ADHESIVE DERMABOND ADVANCED

## (undated) DEVICE — DRESSING TELFA PAD N ADH 2X3

## (undated) DEVICE — SLEEVE SCD EXPRESS CALF MEDIUM

## (undated) DEVICE — CANISTER SUCTION 3000CC

## (undated) DEVICE — SYR COMBO 1ML 27G .5 IN SAFETY

## (undated) DEVICE — SUT MCRYL PLUS 4-0 PS2 27IN

## (undated) DEVICE — SPONGE PATTY SURGICAL .5X3IN

## (undated) DEVICE — SEE MEDLINE ITEM 157116

## (undated) DEVICE — KIT COLLECTION E SWAB REGULAR

## (undated) DEVICE — SUT CTD VICRYL 3-0 CR/SH

## (undated) DEVICE — KIT SHUNT ARTERY 6
Type: IMPLANTABLE DEVICE | Site: CAROTID | Status: NON-FUNCTIONAL
Removed: 2025-05-21

## (undated) DEVICE — PACK NASAL SINUS

## (undated) DEVICE — DRAPE INCISE IOBAN 2 13X13IN

## (undated) DEVICE — KIT ENDO CARRY-ON PROC 100310

## (undated) DEVICE — ELECTRODE REM PLYHSV RETURN 9

## (undated) DEVICE — GLOVE SURG ULTRA TOUCH 7

## (undated) DEVICE — GOWN POLY REINF BRTH SLV LG

## (undated) DEVICE — SPONGE GAUZE 16PLY 4X4

## (undated) DEVICE — SOL 9P NACL IRR PIC IL

## (undated) DEVICE — DRAPE STERILE Z BACK TABLE

## (undated) DEVICE — SUT PROLENE 7-0 BV175-6 30IN

## (undated) DEVICE — EVACUATOR WOUND BULB 100CC

## (undated) DEVICE — SYR B-D DISP CONTROL 10CC100/C

## (undated) DEVICE — SOL POVIDONE SCRUB IODINE 4 OZ

## (undated) DEVICE — DURAPREP SURG SCRUB 26ML

## (undated) DEVICE — GLOVE SURG ULTRA TOUCH 8

## (undated) DEVICE — UNDERGLOVES BIOGEL PI SZ 7 LF

## (undated) DEVICE — PENCIL SMK EVAC CONNECTOR 10FT

## (undated) DEVICE — SUT PROLENE 7-0 BV175-6

## (undated) DEVICE — CATH IV INTROCAN 20G X 1.1

## (undated) DEVICE — COVER LIGHT HANDLE 80/CA

## (undated) DEVICE — SYR SLIP TIP 5CC

## (undated) DEVICE — SHEET DRAPE FAN-FOLDED 3/4

## (undated) DEVICE — BLADE SPEAR TIP BEAVER 45DEG

## (undated) DEVICE — NDL ELECTRODE E-Z CLEAN 2.75IN

## (undated) DEVICE — DRESSING MEPILEX 4X6IN

## (undated) DEVICE — GOWN B1 X-LG X-LONG

## (undated) DEVICE — GLOVE SENSICARE PI MICRO 7.5

## (undated) DEVICE — SUT STRATAFIX 4-0 30CM PS-2

## (undated) DEVICE — COVER CAMERA OPERATING ROOM

## (undated) DEVICE — KIT DEFENDO VLV  AIR WATER SUC

## (undated) DEVICE — TRAY VASCULAR SMH

## (undated) DEVICE — TUBING SUCTION SCALLOP 3/16X10

## (undated) DEVICE — PAD ABD 8X10 STERILE

## (undated) DEVICE — SUT VICRYL PLUS 3-0 SH 18IN

## (undated) DEVICE — SOL IRRI STRL WATER 1000ML

## (undated) DEVICE — CONTAINER SPECIMEN STRL 4OZ

## (undated) DEVICE — BLADE SURG #15 CARBON STEEL

## (undated) DEVICE — DRAPE INSTR MAGNETIC 10X16IN

## (undated) DEVICE — DEVICE INFLATION SE SINUS BLLN

## (undated) DEVICE — DRAIN SINGLE ROUND 1/8 10F

## (undated) DEVICE — GLOVE SURGEONS ULTRA TOUCH 6.5

## (undated) DEVICE — NDL SPINAL 25GX3.5 SPINOCAN

## (undated) DEVICE — SUT MONOCRYL 4-0 PS-2

## (undated) DEVICE — LOOP VESSEL BLUE MINI 2/CARD

## (undated) DEVICE — SOL NACL IRR 1000ML BTL

## (undated) DEVICE — TRAY SKIN SCRUB DRY PREMIUM

## (undated) DEVICE — TOWEL OR DISP STRL BLUE 4/PK

## (undated) DEVICE — TRAY CATH 1-LYR URIMTR 16FR

## (undated) DEVICE — PENCIL ROCKER SWITCH 10FT CORD

## (undated) DEVICE — GLOVE SURG ULTRA TOUCH 7.5

## (undated) DEVICE — BITE BLOCK ADULT LATEX FREE

## (undated) DEVICE — SYR DISP LL 5CC

## (undated) DEVICE — KIT CANIST SUCTION 1200CC

## (undated) DEVICE — DRAPE T THYROID STERILE

## (undated) DEVICE — KIT ENDO CARRY ON PROCEDURE

## (undated) DEVICE — BLADE EZ CLEAN 2.5IN MODIFIED

## (undated) DEVICE — HEMOSTAT SURGICEL 4X8IN